# Patient Record
Sex: FEMALE | Race: BLACK OR AFRICAN AMERICAN | NOT HISPANIC OR LATINO | URBAN - METROPOLITAN AREA
[De-identification: names, ages, dates, MRNs, and addresses within clinical notes are randomized per-mention and may not be internally consistent; named-entity substitution may affect disease eponyms.]

---

## 2019-09-12 ENCOUNTER — INPATIENT (INPATIENT)
Facility: HOSPITAL | Age: 59
LOS: 2 days | Discharge: HOME | End: 2019-09-15
Attending: INTERNAL MEDICINE | Admitting: INTERNAL MEDICINE
Payer: COMMERCIAL

## 2019-09-12 VITALS
SYSTOLIC BLOOD PRESSURE: 178 MMHG | DIASTOLIC BLOOD PRESSURE: 98 MMHG | WEIGHT: 197.98 LBS | HEIGHT: 67 IN | OXYGEN SATURATION: 99 % | RESPIRATION RATE: 18 BRPM | TEMPERATURE: 98 F | HEART RATE: 96 BPM

## 2019-09-12 DIAGNOSIS — Z90.13 ACQUIRED ABSENCE OF BILATERAL BREASTS AND NIPPLES: Chronic | ICD-10-CM

## 2019-09-12 DIAGNOSIS — S89.90XA UNSPECIFIED INJURY OF UNSPECIFIED LOWER LEG, INITIAL ENCOUNTER: Chronic | ICD-10-CM

## 2019-09-12 DIAGNOSIS — K21.9 GASTRO-ESOPHAGEAL REFLUX DISEASE WITHOUT ESOPHAGITIS: ICD-10-CM

## 2019-09-12 DIAGNOSIS — C78.6 SECONDARY MALIGNANT NEOPLASM OF RETROPERITONEUM AND PERITONEUM: ICD-10-CM

## 2019-09-12 DIAGNOSIS — E66.9 OBESITY, UNSPECIFIED: ICD-10-CM

## 2019-09-12 DIAGNOSIS — I10 ESSENTIAL (PRIMARY) HYPERTENSION: ICD-10-CM

## 2019-09-12 DIAGNOSIS — Z98.890 OTHER SPECIFIED POSTPROCEDURAL STATES: Chronic | ICD-10-CM

## 2019-09-12 DIAGNOSIS — G47.33 OBSTRUCTIVE SLEEP APNEA (ADULT) (PEDIATRIC): ICD-10-CM

## 2019-09-12 DIAGNOSIS — C50.912 MALIGNANT NEOPLASM OF UNSPECIFIED SITE OF LEFT FEMALE BREAST: ICD-10-CM

## 2019-09-12 LAB
ALBUMIN SERPL ELPH-MCNC: 3.6 G/DL — SIGNIFICANT CHANGE UP (ref 3.3–5)
ALP SERPL-CCNC: 103 U/L — SIGNIFICANT CHANGE UP (ref 40–120)
ALT FLD-CCNC: 19 U/L — SIGNIFICANT CHANGE UP (ref 12–78)
ANION GAP SERPL CALC-SCNC: 7 MMOL/L — SIGNIFICANT CHANGE UP (ref 5–17)
AST SERPL-CCNC: 19 U/L — SIGNIFICANT CHANGE UP (ref 15–37)
BASOPHILS # BLD AUTO: 0.03 K/UL — SIGNIFICANT CHANGE UP (ref 0–0.2)
BASOPHILS NFR BLD AUTO: 0.3 % — SIGNIFICANT CHANGE UP (ref 0–2)
BILIRUB SERPL-MCNC: 0.4 MG/DL — SIGNIFICANT CHANGE UP (ref 0.2–1.2)
BUN SERPL-MCNC: 8 MG/DL — SIGNIFICANT CHANGE UP (ref 7–23)
CALCIUM SERPL-MCNC: 9.4 MG/DL — SIGNIFICANT CHANGE UP (ref 8.5–10.1)
CHLORIDE SERPL-SCNC: 104 MMOL/L — SIGNIFICANT CHANGE UP (ref 96–108)
CO2 SERPL-SCNC: 31 MMOL/L — SIGNIFICANT CHANGE UP (ref 22–31)
CREAT SERPL-MCNC: 0.83 MG/DL — SIGNIFICANT CHANGE UP (ref 0.5–1.3)
EOSINOPHIL # BLD AUTO: 0.02 K/UL — SIGNIFICANT CHANGE UP (ref 0–0.5)
EOSINOPHIL NFR BLD AUTO: 0.2 % — SIGNIFICANT CHANGE UP (ref 0–6)
GLUCOSE SERPL-MCNC: 114 MG/DL — HIGH (ref 70–99)
HCG SERPL-ACNC: 1 MIU/ML — SIGNIFICANT CHANGE UP
HCT VFR BLD CALC: 37.3 % — SIGNIFICANT CHANGE UP (ref 34.5–45)
HGB BLD-MCNC: 12.3 G/DL — SIGNIFICANT CHANGE UP (ref 11.5–15.5)
IMM GRANULOCYTES NFR BLD AUTO: 0.2 % — SIGNIFICANT CHANGE UP (ref 0–1.5)
LIDOCAIN IGE QN: 71 U/L — LOW (ref 73–393)
LYMPHOCYTES # BLD AUTO: 1.37 K/UL — SIGNIFICANT CHANGE UP (ref 1–3.3)
LYMPHOCYTES # BLD AUTO: 15.8 % — SIGNIFICANT CHANGE UP (ref 13–44)
MAGNESIUM SERPL-MCNC: 2.2 MG/DL — SIGNIFICANT CHANGE UP (ref 1.6–2.6)
MCHC RBC-ENTMCNC: 31.5 PG — SIGNIFICANT CHANGE UP (ref 27–34)
MCHC RBC-ENTMCNC: 33 GM/DL — SIGNIFICANT CHANGE UP (ref 32–36)
MCV RBC AUTO: 95.6 FL — SIGNIFICANT CHANGE UP (ref 80–100)
MONOCYTES # BLD AUTO: 0.75 K/UL — SIGNIFICANT CHANGE UP (ref 0–0.9)
MONOCYTES NFR BLD AUTO: 8.6 % — SIGNIFICANT CHANGE UP (ref 2–14)
NEUTROPHILS # BLD AUTO: 6.49 K/UL — SIGNIFICANT CHANGE UP (ref 1.8–7.4)
NEUTROPHILS NFR BLD AUTO: 74.9 % — SIGNIFICANT CHANGE UP (ref 43–77)
NRBC # BLD: 0 /100 WBCS — SIGNIFICANT CHANGE UP (ref 0–0)
PLATELET # BLD AUTO: 361 K/UL — SIGNIFICANT CHANGE UP (ref 150–400)
POTASSIUM SERPL-MCNC: 3.7 MMOL/L — SIGNIFICANT CHANGE UP (ref 3.5–5.3)
POTASSIUM SERPL-SCNC: 3.7 MMOL/L — SIGNIFICANT CHANGE UP (ref 3.5–5.3)
PROT SERPL-MCNC: 8.1 GM/DL — SIGNIFICANT CHANGE UP (ref 6–8.3)
RBC # BLD: 3.9 M/UL — SIGNIFICANT CHANGE UP (ref 3.8–5.2)
RBC # FLD: 12.2 % — SIGNIFICANT CHANGE UP (ref 10.3–14.5)
SODIUM SERPL-SCNC: 142 MMOL/L — SIGNIFICANT CHANGE UP (ref 135–145)
WBC # BLD: 8.68 K/UL — SIGNIFICANT CHANGE UP (ref 3.8–10.5)
WBC # FLD AUTO: 8.68 K/UL — SIGNIFICANT CHANGE UP (ref 3.8–10.5)

## 2019-09-12 PROCEDURE — 99285 EMERGENCY DEPT VISIT HI MDM: CPT

## 2019-09-12 PROCEDURE — 93010 ELECTROCARDIOGRAM REPORT: CPT

## 2019-09-12 PROCEDURE — 74177 CT ABD & PELVIS W/CONTRAST: CPT | Mod: 26

## 2019-09-12 PROCEDURE — 71045 X-RAY EXAM CHEST 1 VIEW: CPT | Mod: 26

## 2019-09-12 RX ORDER — ONDANSETRON 8 MG/1
4 TABLET, FILM COATED ORAL ONCE
Refills: 0 | Status: COMPLETED | OUTPATIENT
Start: 2019-09-12 | End: 2019-09-12

## 2019-09-12 RX ORDER — ONDANSETRON 8 MG/1
4 TABLET, FILM COATED ORAL EVERY 6 HOURS
Refills: 0 | Status: DISCONTINUED | OUTPATIENT
Start: 2019-09-12 | End: 2019-09-14

## 2019-09-12 RX ORDER — NEBIVOLOL HYDROCHLORIDE 5 MG/1
1 TABLET ORAL
Qty: 0 | Refills: 0 | DISCHARGE

## 2019-09-12 RX ORDER — CARVEDILOL PHOSPHATE 80 MG/1
3.12 CAPSULE, EXTENDED RELEASE ORAL EVERY 12 HOURS
Refills: 0 | Status: DISCONTINUED | OUTPATIENT
Start: 2019-09-12 | End: 2019-09-15

## 2019-09-12 RX ORDER — MORPHINE SULFATE 50 MG/1
4 CAPSULE, EXTENDED RELEASE ORAL ONCE
Refills: 0 | Status: DISCONTINUED | OUTPATIENT
Start: 2019-09-12 | End: 2019-09-12

## 2019-09-12 RX ORDER — ENOXAPARIN SODIUM 100 MG/ML
40 INJECTION SUBCUTANEOUS DAILY
Refills: 0 | Status: DISCONTINUED | OUTPATIENT
Start: 2019-09-12 | End: 2019-09-15

## 2019-09-12 RX ORDER — MORPHINE SULFATE 50 MG/1
2 CAPSULE, EXTENDED RELEASE ORAL EVERY 4 HOURS
Refills: 0 | Status: DISCONTINUED | OUTPATIENT
Start: 2019-09-12 | End: 2019-09-14

## 2019-09-12 RX ORDER — LISINOPRIL/HYDROCHLOROTHIAZIDE 10-12.5 MG
1 TABLET ORAL
Qty: 0 | Refills: 0 | DISCHARGE

## 2019-09-12 RX ORDER — ERGOCALCIFEROL 1.25 MG/1
1 CAPSULE ORAL
Qty: 0 | Refills: 0 | DISCHARGE

## 2019-09-12 RX ORDER — LISINOPRIL 2.5 MG/1
10 TABLET ORAL DAILY
Refills: 0 | Status: DISCONTINUED | OUTPATIENT
Start: 2019-09-12 | End: 2019-09-15

## 2019-09-12 RX ADMIN — ONDANSETRON 4 MILLIGRAM(S): 8 TABLET, FILM COATED ORAL at 11:12

## 2019-09-12 RX ADMIN — MORPHINE SULFATE 4 MILLIGRAM(S): 50 CAPSULE, EXTENDED RELEASE ORAL at 11:12

## 2019-09-12 RX ADMIN — CARVEDILOL PHOSPHATE 3.12 MILLIGRAM(S): 80 CAPSULE, EXTENDED RELEASE ORAL at 17:51

## 2019-09-12 RX ADMIN — ONDANSETRON 4 MILLIGRAM(S): 8 TABLET, FILM COATED ORAL at 11:13

## 2019-09-12 RX ADMIN — ENOXAPARIN SODIUM 40 MILLIGRAM(S): 100 INJECTION SUBCUTANEOUS at 17:51

## 2019-09-12 NOTE — ED PROVIDER NOTE - OBJECTIVE STATEMENT
Pertinent PMH/PSH/FHx/SHx and Review of Systems contained within:  59F hx of breast ca pw abd pain. symptoms x1 month but has never done anything about it until today. associated with nausea and vomiting. no fever, no diarrhea, no constipation. didn't take anything medicationwise. ros neg for ha, vision loss, rhinorrhea, dysuria, numbness, rash, bleeding,   Fh and Sh not otherwise contributory  ROS otherwise negative

## 2019-09-12 NOTE — ED ADULT NURSE NOTE - PMH
Acid reflux    Hypertension    Obesity (BMI 30.0-34.9)    Seasonal allergies    Sleep apnea  Use Oral Device

## 2019-09-12 NOTE — H&P ADULT - NSHPLABSRESULTS_GEN_ALL_CORE
12.3   8.68  )-----------( 361      ( 12 Sep 2019 11:12 )             37.3     09-12    142  |  104  |  8   ----------------------------<  114<H>  3.7   |  31  |  0.83    Ca    9.4      12 Sep 2019 11:12  Mg     2.2     09-12    TPro  8.1  /  Alb  3.6  /  TBili  0.4  /  DBili  x   /  AST  19  /  ALT  19  /  AlkPhos  103  09-12        CAPILLARY BLOOD GLUCOSE    < from: CT Abdomen and Pelvis w/ IV Cont (09.12.19 @ 11:41) >    IMPRESSION:     Mild ascites.  Omental nodularity, suspicious for peritoneal carcinomatosis.  No bowel obstruction.  Question 1.2 cm endometrial polyp.      < end of copied text >                Urine Culture:      Blood Culture:

## 2019-09-12 NOTE — PATIENT PROFILE ADULT - MONEY FOR FOOD
Anesthesia Evaluation     Patient summary reviewed and Nursing notes reviewed                Airway   Mallampati: I  TM distance: <3 FB  Neck ROM: full  no difficulty expected  Dental - normal exam     Pulmonary - normal exam   (+) pneumonia resolved , asthma, shortness of breath,   Cardiovascular - negative cardio ROS and normal exam        Neuro/Psych  (+) dizziness/light headedness, psychiatric history Anxiety,     GI/Hepatic/Renal/Endo    (+)  GERD, GI bleeding,     Musculoskeletal (-) negative ROS    Abdominal  - normal exam    Bowel sounds: normal.   Substance History - negative use     OB/GYN negative ob/gyn ROS         Other                        Anesthesia Plan    ASA 2     MAC     Anesthetic plan and risks discussed with patient.      
no

## 2019-09-12 NOTE — ED PROVIDER NOTE - CLINICAL SUMMARY MEDICAL DECISION MAKING FREE TEXT BOX
abd pain. abd pain. ct shows peritoneal carcinomatosis. case dw her oncologist dr marquez who recommends admission for vomit control, fluids and work up.

## 2019-09-12 NOTE — H&P ADULT - HISTORY OF PRESENT ILLNESS
· HPI  59F hx of breast ca pw abd pain. symptoms x1 month but has never done anything about it until today. associated with nausea and vomiting. no fever, no diarrhea, no constipation. didn't take anything medicationwise. ros neg for ha, vision loss, rhinorrhea, dysuria, numbness, rash, bleeding,   Fh and Sh not otherwise contributory ROS otherwise negative

## 2019-09-12 NOTE — ED PROVIDER NOTE - PHYSICAL EXAMINATION
Gen: Alert, NAD  Head: NC, AT   Eyes: PERRL, EOMI, normal lids/conjunctiva  ENT: normal hearing, patent oropharynx without erythema/exudate, uvula midline  Neck: supple, no tenderness, Trachea midline  Pulm: Bilateral BS, normal resp effort, no wheeze/stridor/retractions  CV: RRR, no M/R/G, 2+ radial and dp pulses bl, no edema  Abd: soft, distended. left side ttp  Mskel: extremities x4 with normal ROM and no joint effusions. no ctl spine ttp.   Skin: no rash, no bruising   Neuro: AAOx3, no sensory/motor deficits, CN 2-12 intact

## 2019-09-12 NOTE — H&P ADULT - NSICDXPASTMEDICALHX_GEN_ALL_CORE_FT
PAST MEDICAL HISTORY:  Acid reflux     Cancer of breast Rt. Female    Essential hypertension     Obesity (BMI 30.0-34.9)     Seasonal allergies     Sleep apnea Use Oral Device

## 2019-09-12 NOTE — H&P ADULT - NSICDXPASTSURGICALHX_GEN_ALL_CORE_FT
PAST SURGICAL HISTORY:  Knee injury Left & had surgery about 30 years ago    S/P bilateral mastectomy     S/P breast reconstruction, bilateral

## 2019-09-12 NOTE — ED ADULT NURSE NOTE - PAIN: PRESENCE, MLM
Elevated lower extremities while in recliner  Chest xray and blood work today   Video swallow- call to schedule soon      Hyponatremia  Hyponatremia means low sodium levels in the blood.  This condition most often occurs after prolonged vomiting or diarrhea follow your healthcare professional's instructions. Soft Diet  Your healthcare provider has prescribed a soft diet (also called gastrointestinal soft diet).  This means eating foods that are soft, low in fiber, and easy to digest. This diet is for pe apple, applesauce, peeled ripe peaches or pears, canned fruit (apricots, cherries, peaches, pears), melons  Avoid: Raw apple, dried fruits, coconut, pineapple, grapes, fruit kimani, fruit snacks  Meat and fish  OK:  All fresh meat, poultry, or fish that i butter  · Eggs  · Fish, turkey, chicken, or other meat that is not tough or stringy  · Tofu  Foods to avoid  · Nuts and seeds  · Snack foods, such as the following:  ? Chocolate-containing snacks, candy, pastries, or cakes. ?  Potato chips (plain, barbecue complains of pain/discomfort

## 2019-09-12 NOTE — ED ADULT NURSE NOTE - OBJECTIVE STATEMENT
pt received alert and oriented x4, pt c/o abdominal pain  , pt with vomiting , and abdominal pain that started this morning .

## 2019-09-12 NOTE — H&P ADULT - NSHPPHYSICALEXAM_GEN_ALL_CORE
GENERAL: NAD, well-groomed, well-developed  HEAD:  Atraumatic, Normocephalic  EYES: EOMI, PERRL, conjunctiva and sclera clear  ENMT:  Moist mucous membranes, Good dentition, No lesions  NECK: Supple, No JVD, Normal thyroid  NERVOUS SYSTEM:  Alert & Oriented X 3, Good concentration; Motor Strength 5/5 B/L upper and lower extremities; DTRs 2+ intact and symmetric  CHEST/LUNG: Clear to percussion bilaterally; No rales, rhonchi, wheezing, or rubs  HEART: Regular rate and rhythm; No murmurs, rubs, or gallops  ABDOMEN: Soft, Nontender, Distended; Bowel sounds present, Hypoactive  EXTREMITIES:  2+ Peripheral Pulses, No clubbing, cyanosis, or edema  LYMPH: No lymphadenopathy noted  SKIN: No rashes or lesions      Vital Signs Last 24 Hrs  T(C): 36.9 (12 Sep 2019 09:59), Max: 36.9 (12 Sep 2019 09:59)  T(F): 98.4 (12 Sep 2019 09:59), Max: 98.4 (12 Sep 2019 09:59)  HR: 96 (12 Sep 2019 09:59) (96 - 96)  BP: 178/98 (12 Sep 2019 09:59) (178/98 - 178/98)  BP(mean): --  RR: 18 (12 Sep 2019 09:59) (18 - 18)  SpO2: 99% (12 Sep 2019 09:59) (99% - 99%)

## 2019-09-12 NOTE — H&P ADULT - ASSESSMENT
IMPROVE VTE Individual Risk Assessment          RISK                                                          Points  [  ] Previous VTE                                                3  [  ] Thrombophilia                                             2  [  ] Lower limb paralysis                                   2        (unable to hold up >15 seconds)    [x  ] Current Cancer                                             2         (within 6 months)  [ x ] Immobilization > 24 hrs                              1  [  ] ICU/CCU stay > 24 hours                             1  [  ] Age > 60                                                         1    IMPROVE VTE Score:         [     3    ]    Total Risk Factor Score:    0 - 1:   Consider IPC  >2 - 3:  Thromboprophylaxis required (enoxaparin or SQ heparin)        >4:   High Risk: Thromboprophylaxis required (enoxaparin or SQ heparin), optional add IPC  **If CONTRAINDICATION to enoxaparin or SQ heparin, USE IPCs**    Lovenox  59F hx of breast ca pw abd pain. symptoms x1 month but has never done anything about it until today. associated with nausea and vomiting. no fever, no diarrhea, no constipation. didn't take anything medicationwise. ros neg for ha, vision loss, rhinorrhea, dysuria, numbness, rash, bleeding, Admitted for Carcinomatosis, Possible stage 4 breast ca. Will get Oncology consult. Clear  liquid diet . IV fluids.

## 2019-09-13 ENCOUNTER — RESULT REVIEW (OUTPATIENT)
Age: 59
End: 2019-09-13

## 2019-09-13 DIAGNOSIS — R10.84 GENERALIZED ABDOMINAL PAIN: ICD-10-CM

## 2019-09-13 DIAGNOSIS — K59.09 OTHER CONSTIPATION: ICD-10-CM

## 2019-09-13 LAB
ANION GAP SERPL CALC-SCNC: 6 MMOL/L — SIGNIFICANT CHANGE UP (ref 5–17)
APPEARANCE UR: CLEAR — SIGNIFICANT CHANGE UP
APTT BLD: 32.6 SEC — SIGNIFICANT CHANGE UP (ref 27.5–36.3)
BASOPHILS # BLD AUTO: 0.02 K/UL — SIGNIFICANT CHANGE UP (ref 0–0.2)
BASOPHILS NFR BLD AUTO: 0.4 % — SIGNIFICANT CHANGE UP (ref 0–2)
BILIRUB UR-MCNC: NEGATIVE — SIGNIFICANT CHANGE UP
BUN SERPL-MCNC: 7 MG/DL — SIGNIFICANT CHANGE UP (ref 7–23)
CALCIUM SERPL-MCNC: 8.6 MG/DL — SIGNIFICANT CHANGE UP (ref 8.5–10.1)
CANCER AG125 SERPL-ACNC: 305 U/ML — HIGH
CEA SERPL-MCNC: 1 NG/ML — SIGNIFICANT CHANGE UP (ref 0–3.8)
CHLORIDE SERPL-SCNC: 105 MMOL/L — SIGNIFICANT CHANGE UP (ref 96–108)
CO2 SERPL-SCNC: 30 MMOL/L — SIGNIFICANT CHANGE UP (ref 22–31)
COLOR SPEC: YELLOW — SIGNIFICANT CHANGE UP
CREAT SERPL-MCNC: 0.81 MG/DL — SIGNIFICANT CHANGE UP (ref 0.5–1.3)
DIFF PNL FLD: ABNORMAL
EOSINOPHIL # BLD AUTO: 0.05 K/UL — SIGNIFICANT CHANGE UP (ref 0–0.5)
EOSINOPHIL NFR BLD AUTO: 0.9 % — SIGNIFICANT CHANGE UP (ref 0–6)
EPI CELLS # UR: SIGNIFICANT CHANGE UP
GLUCOSE SERPL-MCNC: 90 MG/DL — SIGNIFICANT CHANGE UP (ref 70–99)
GLUCOSE UR QL: NEGATIVE MG/DL — SIGNIFICANT CHANGE UP
HCT VFR BLD CALC: 33.4 % — LOW (ref 34.5–45)
HCV AB S/CO SERPL IA: 0.12 S/CO — SIGNIFICANT CHANGE UP (ref 0–0.99)
HCV AB SERPL-IMP: SIGNIFICANT CHANGE UP
HGB BLD-MCNC: 10.8 G/DL — LOW (ref 11.5–15.5)
IMM GRANULOCYTES NFR BLD AUTO: 0.2 % — SIGNIFICANT CHANGE UP (ref 0–1.5)
INR BLD: 1.16 RATIO — SIGNIFICANT CHANGE UP (ref 0.88–1.16)
KETONES UR-MCNC: NEGATIVE — SIGNIFICANT CHANGE UP
LEUKOCYTE ESTERASE UR-ACNC: ABNORMAL
LYMPHOCYTES # BLD AUTO: 1.54 K/UL — SIGNIFICANT CHANGE UP (ref 1–3.3)
LYMPHOCYTES # BLD AUTO: 28.4 % — SIGNIFICANT CHANGE UP (ref 13–44)
MAGNESIUM SERPL-MCNC: 2.3 MG/DL — SIGNIFICANT CHANGE UP (ref 1.6–2.6)
MCHC RBC-ENTMCNC: 31.3 PG — SIGNIFICANT CHANGE UP (ref 27–34)
MCHC RBC-ENTMCNC: 32.3 GM/DL — SIGNIFICANT CHANGE UP (ref 32–36)
MCV RBC AUTO: 96.8 FL — SIGNIFICANT CHANGE UP (ref 80–100)
MONOCYTES # BLD AUTO: 0.57 K/UL — SIGNIFICANT CHANGE UP (ref 0–0.9)
MONOCYTES NFR BLD AUTO: 10.5 % — SIGNIFICANT CHANGE UP (ref 2–14)
NEUTROPHILS # BLD AUTO: 3.24 K/UL — SIGNIFICANT CHANGE UP (ref 1.8–7.4)
NEUTROPHILS NFR BLD AUTO: 59.6 % — SIGNIFICANT CHANGE UP (ref 43–77)
NITRITE UR-MCNC: NEGATIVE — SIGNIFICANT CHANGE UP
NRBC # BLD: 0 /100 WBCS — SIGNIFICANT CHANGE UP (ref 0–0)
PH UR: 7 — SIGNIFICANT CHANGE UP (ref 5–8)
PLATELET # BLD AUTO: 314 K/UL — SIGNIFICANT CHANGE UP (ref 150–400)
POTASSIUM SERPL-MCNC: 3.4 MMOL/L — LOW (ref 3.5–5.3)
POTASSIUM SERPL-SCNC: 3.4 MMOL/L — LOW (ref 3.5–5.3)
PROT UR-MCNC: NEGATIVE MG/DL — SIGNIFICANT CHANGE UP
PROTHROM AB SERPL-ACNC: 13.1 SEC — HIGH (ref 10–12.9)
RBC # BLD: 3.45 M/UL — LOW (ref 3.8–5.2)
RBC # FLD: 12.4 % — SIGNIFICANT CHANGE UP (ref 10.3–14.5)
RBC CASTS # UR COMP ASSIST: SIGNIFICANT CHANGE UP /HPF (ref 0–4)
SODIUM SERPL-SCNC: 141 MMOL/L — SIGNIFICANT CHANGE UP (ref 135–145)
SP GR SPEC: 1 — LOW (ref 1.01–1.02)
UROBILINOGEN FLD QL: NEGATIVE MG/DL — SIGNIFICANT CHANGE UP
WBC # BLD: 5.43 K/UL — SIGNIFICANT CHANGE UP (ref 3.8–10.5)
WBC # FLD AUTO: 5.43 K/UL — SIGNIFICANT CHANGE UP (ref 3.8–10.5)
WBC UR QL: ABNORMAL

## 2019-09-13 PROCEDURE — 99221 1ST HOSP IP/OBS SF/LOW 40: CPT | Mod: 25

## 2019-09-13 PROCEDURE — 77012 CT SCAN FOR NEEDLE BIOPSY: CPT | Mod: 26

## 2019-09-13 PROCEDURE — 49180 BIOPSY ABDOMINAL MASS: CPT

## 2019-09-13 PROCEDURE — 88305 TISSUE EXAM BY PATHOLOGIST: CPT | Mod: 26

## 2019-09-13 RX ORDER — SENNA PLUS 8.6 MG/1
2 TABLET ORAL AT BEDTIME
Refills: 0 | Status: DISCONTINUED | OUTPATIENT
Start: 2019-09-13 | End: 2019-09-15

## 2019-09-13 RX ORDER — PANTOPRAZOLE SODIUM 20 MG/1
40 TABLET, DELAYED RELEASE ORAL DAILY
Refills: 0 | Status: DISCONTINUED | OUTPATIENT
Start: 2019-09-13 | End: 2019-09-14

## 2019-09-13 RX ORDER — POLYETHYLENE GLYCOL 3350 17 G/17G
17 POWDER, FOR SOLUTION ORAL DAILY
Refills: 0 | Status: DISCONTINUED | OUTPATIENT
Start: 2019-09-13 | End: 2019-09-15

## 2019-09-13 RX ORDER — DOCUSATE SODIUM 100 MG
100 CAPSULE ORAL DAILY
Refills: 0 | Status: DISCONTINUED | OUTPATIENT
Start: 2019-09-13 | End: 2019-09-15

## 2019-09-13 RX ORDER — POTASSIUM CHLORIDE 20 MEQ
20 PACKET (EA) ORAL ONCE
Refills: 0 | Status: COMPLETED | OUTPATIENT
Start: 2019-09-13 | End: 2019-09-13

## 2019-09-13 RX ORDER — ACETAMINOPHEN 500 MG
650 TABLET ORAL EVERY 6 HOURS
Refills: 0 | Status: DISCONTINUED | OUTPATIENT
Start: 2019-09-13 | End: 2019-09-15

## 2019-09-13 RX ADMIN — CARVEDILOL PHOSPHATE 3.12 MILLIGRAM(S): 80 CAPSULE, EXTENDED RELEASE ORAL at 17:33

## 2019-09-13 RX ADMIN — LISINOPRIL 10 MILLIGRAM(S): 2.5 TABLET ORAL at 05:31

## 2019-09-13 RX ADMIN — PANTOPRAZOLE SODIUM 40 MILLIGRAM(S): 20 TABLET, DELAYED RELEASE ORAL at 12:32

## 2019-09-13 RX ADMIN — Medication 20 MILLIEQUIVALENT(S): at 13:05

## 2019-09-13 RX ADMIN — MORPHINE SULFATE 2 MILLIGRAM(S): 50 CAPSULE, EXTENDED RELEASE ORAL at 17:41

## 2019-09-13 RX ADMIN — MORPHINE SULFATE 2 MILLIGRAM(S): 50 CAPSULE, EXTENDED RELEASE ORAL at 17:56

## 2019-09-13 RX ADMIN — SENNA PLUS 2 TABLET(S): 8.6 TABLET ORAL at 22:39

## 2019-09-13 RX ADMIN — CARVEDILOL PHOSPHATE 3.12 MILLIGRAM(S): 80 CAPSULE, EXTENDED RELEASE ORAL at 05:31

## 2019-09-13 NOTE — CONSULT NOTE ADULT - ASSESSMENT
A/P: 58yo Female with PMH HTN, Left Breast Ca s/p b/l mastectomy and chemo tx in 2017 now presents c/o abd pain and bloating x1 month, CT shows omental nodularity, suspicious for peritoneal carcinomatosis. No bowel obstruction.    -IR consult for biopsy   -Oncology consult appreciated, f/u tumor markers  -diet as tolerated  -pain control and antiemetic prn  -cont medical management and supportive care  -DVT ppx  -f/u labs  -Discussed with Dr. Poole
58 yo female with pmhx of breast cancer s/p b/l mastectomy and b/l axillary lymph node dissection (as per patient b/l lymph nodes were cancerous) and chemo treatment in 2017.  Pt now with abdominal pain, found to have Omental nodularity on CT scan suspicious for peritoneal carcinomatosis
58yo Female with PMH HTN, Left Breast Ca s/p b/l mastectomy and chemo tx in 2017 who presents with intermittent abdominal pain and bloating x 1 month. CT concerning for peritoneal carcinomatosis.     Recent colonoscopy 3/2019 normal per patient. No signs/symptoms of bowel obstruction. She is pending IR biopsy of peritoneal met.

## 2019-09-13 NOTE — CONSULT NOTE ADULT - CONSULT REASON
Abdominal pain, peritoneal carcinomatosis
Abdominal Pain.  Ascites.  Abdominal Distension.
Abdominal pain, bloating; peritoneal carcinomatosis on CT
omental nodular biopsy

## 2019-09-13 NOTE — CHART NOTE - NSCHARTNOTEFT_GEN_A_CORE
Vascular & Interventional Radiology Post-Procedure Note    Pre-Procedure Diagnosis: Metastasis  Post-Procedure Diagnosis: Same as pre.  Indications for Procedure: workup    : Elyse  Assistant(s): none    Procedure Details/Findings: Left upper quadrant omental nodularity biopsy  Access (if applicable): n/a    Complications: none  Estimated Blood Loss: Minimal  Specimen: 4 cores 18G x 2cm each  Contrast: none  Sedation: none  Patient Condition/Disposition: stable, 1 hour recovery, then floor    Plan: f/u pathology  -OK to resume anticoagulation if still prescribed in 24 hours.

## 2019-09-13 NOTE — PROGRESS NOTE ADULT - SUBJECTIVE AND OBJECTIVE BOX
Patient is a 59y old  Female who presents with a chief complaint of N/V Pain abdomen (13 Sep 2019 12:29)      INTERVAL HPI/OVERNIGHT EVENTS:    MEDICATIONS  (STANDING):  carvedilol 3.125 milliGRAM(s) Oral every 12 hours  docusate sodium 100 milliGRAM(s) Oral daily  enoxaparin Injectable 40 milliGRAM(s) SubCutaneous daily  lisinopril 10 milliGRAM(s) Oral daily  pantoprazole  Injectable 40 milliGRAM(s) IV Push daily  senna 2 Tablet(s) Oral at bedtime    MEDICATIONS  (PRN):  morphine  - Injectable 2 milliGRAM(s) IV Push every 4 hours PRN Severe Pain (7 - 10)  ondansetron Injectable 4 milliGRAM(s) IV Push every 6 hours PRN Nausea  polyethylene glycol 3350 17 Gram(s) Oral daily PRN Constipation      Allergies    No Known Allergies    Intolerances        REVIEW OF SYSTEMS:  CONSTITUTIONAL: No fever, weight loss, or fatigue  EYES: No eye pain, visual disturbances, or discharge  ENMT:  No difficulty hearing, tinnitus, vertigo; No sinus or throat pain  NECK: No pain or stiffness  BREASTS: No pain, masses, or nipple discharge  RESPIRATORY: No cough, wheezing, chills or hemoptysis; No shortness of breath  CARDIOVASCULAR: No chest pain, palpitations, dizziness, or leg swelling  GASTROINTESTINAL: No abdominal or epigastric pain. No nausea, vomiting, or hematemesis; No diarrhea or constipation. No melena or hematochezia.  GENITOURINARY: No dysuria, frequency, hematuria, or incontinence  NEUROLOGICAL: No headaches, memory loss, loss of strength, numbness, or tremors  SKIN: No itching, burning, rashes, or lesions   LYMPH NODES: No enlarged glands  ENDOCRINE: No heat or cold intolerance; No hair loss  MUSCULOSKELETAL: No joint pain or swelling; No muscle, back, or extremity pain  PSYCHIATRIC: No depression, anxiety, mood swings, or difficulty sleeping  HEME/LYMPH: No easy bruising, or bleeding gums  ALLERGY AND IMMUNOLOGIC: No hives or eczema    Vital Signs Last 24 Hrs  T(C): 36.7 (13 Sep 2019 11:10), Max: 36.9 (12 Sep 2019 16:30)  T(F): 98 (13 Sep 2019 11:10), Max: 98.5 (12 Sep 2019 16:30)  HR: 70 (13 Sep 2019 12:37) (69 - 77)  BP: 170/81 (13 Sep 2019 12:37) (125/71 - 170/81)  BP(mean): --  RR: 18 (13 Sep 2019 11:10) (18 - 21)  SpO2: 97% (13 Sep 2019 11:10) (95% - 100%)    PHYSICAL EXAM:  GENERAL: NAD, well-groomed, well-developed  HEAD:  Atraumatic, Normocephalic  EYES: EOMI, PERRL, conjunctiva and sclera clear  ENMT:  Moist mucous membranes, Good dentition, No lesions  NECK: Supple, No JVD, Normal thyroid  NERVOUS SYSTEM:  Alert & Oriented X3, Good concentration; Motor Strength 5/5 B/L upper and lower extremities; DTRs 2+ intact and symmetric  CHEST/LUNG: Clear to percussion bilaterally; No rales, rhonchi, wheezing, or rubs  HEART: Regular rate and rhythm; No murmurs, rubs, or gallops  ABDOMEN: Soft, Nontender, Nondistended; Bowel sounds present  EXTREMITIES:  2+ Peripheral Pulses, No clubbing, cyanosis, or edema  LYMPH: No lymphadenopathy noted  SKIN: No rashes or lesions    LABS:                        10.8   5.43  )-----------( 314      ( 13 Sep 2019 06:44 )             33.4         141  |  105  |  7   ----------------------------<  90  3.4<L>   |  30  |  0.81    Ca    8.6      13 Sep 2019 06:44  Mg     2.3         TPro  8.1  /  Alb  3.6  /  TBili  0.4  /  DBili  x   /  AST  19  /  ALT  19  /  AlkPhos  103  09-12    PT/INR - ( 13 Sep 2019 12:46 )   PT: 13.1 sec;   INR: 1.16 ratio         PTT - ( 13 Sep 2019 12:46 )  PTT:32.6 sec  Urinalysis Basic - ( 13 Sep 2019 00:55 )    Color: Yellow / Appearance: Clear / S.005 / pH: x  Gluc: x / Ketone: Negative  / Bili: Negative / Urobili: Negative mg/dL   Blood: x / Protein: Negative mg/dL / Nitrite: Negative   Leuk Esterase: Small / RBC: 0-2 /HPF / WBC 26-50   Sq Epi: x / Non Sq Epi: Few / Bacteria: x      CAPILLARY BLOOD GLUCOSE        RADIOLOGY & ADDITIONAL TESTS:  < from: CT Abdomen and Pelvis w/ IV Cont (19 @ 11:41) >    EXAM:  CT ABDOMEN AND PELVIS IC                            PROCEDURE DATE:  2019          INTERPRETATION:  CLINICAL INFORMATION: Left abdominal pain    COMPARISON: None.    PROCEDURE:   CT of the Abdomen and Pelvis was performed with intravenous contrast.   Intravenous contrast: 90 ml Omnipaque 350. 10 ml discarded.  Oral contrast: None.  Sagittal and coronal reformats were performed.    FINDINGS:    LOWER CHEST: Bilateral breast implants. Basilar atelectasis.    LIVER: Within normal limits.  BILE DUCTS: Normal caliber.  GALLBLADDER: Within normal limits.  SPLEEN: Within normal limits.  PANCREAS: Within normal limits.  ADRENALS: Within normal limits.  KIDNEYS/URETERS: Within normal limits.    BLADDER: Minimally distended.  REPRODUCTIVE ORGANS: Question 1.2 cm endometrial polyp.    BOWEL: No bowel obstruction. Appendix within normal limits. Several   colonic diverticula.  PERITONEUM: Mild ascites. Omental nodularity.  VESSELS: Within normal limits.  RETROPERITONEUM/LYMPH NODES: No lymphadenopathy. Prominent   retroperitoneal lymph nodes.  ABDOMINAL WALL: Within normal limits.  BONES: Spinal degenerative changes.    IMPRESSION:     Mild ascites.  Omental nodularity, suspicious for peritoneal carcinomatosis.  No bowel obstruction.  Question 1.2 cm endometrial polyp.      VERA GARCIA M.D., ATTENDING RADIOLOGIST  This document has been electronically signed. Sep 12 2019 11:58AM       < end of copied text >    Imaging Personally Reviewed:  [ ] YES  [ ] NO    Consultant(s) Notes Reviewed:  [ ] YES  [ ] NO    Care Discussed with Consultants/Other Providers [ ] YES  [ ] NO    PROBLEMS:  PERITONEAL, CARCINOMATOSIS  UPPER ABDOMINAL PAIN VOMITING  Peritoneal carcinomatosis  Other constipation  Generalized abdominal pain  Obesity (BMI 30.0-34.9)  Obstructive sleep apnea syndrome  Gastroesophageal reflux disease without esophagitis  Malignant neoplasm of left female breast, unspecified estrogen receptor status, unspecified site of breast  Essential hypertension  Peritoneal carcinomatosis      Care discussed with family,         [  ]   yes  [  ]  No    imp:    stable[ ]    unstable[  ]     improving [   ]       unchanged  [  ]                Plans:  Continue present plans  [  ]               New consult [  ]   specialty  .......               order oc[  ]    test name.                  Discharge Planning  [  ]

## 2019-09-13 NOTE — CONSULT NOTE ADULT - PROBLEM SELECTOR RECOMMENDATION 2
-IR consult for biopsy pending   -Appreciate oncology and surgery recommendations   -Tumor markers pending  **Obtain records from recent colonoscopy: patient reported 3/2019, normal (unsure of GI physician however she will obtain this information).

## 2019-09-13 NOTE — CONSULT NOTE ADULT - SUBJECTIVE AND OBJECTIVE BOX
GENERAL SURGERY CONSULT NOTE    Patient is a 59y old  Female who presents with a chief complaint of N/V Pain abdomen (13 Sep 2019 11:02)    HPI:  · HPI  59F hx of breast ca pw abd pain. symptoms x1 month but has never done anything about it until today. associated with nausea and vomiting. no fever, no diarrhea, no constipation. didn't take anything medicationwise. ros neg for ha, vision loss, rhinorrhea, dysuria, numbness, rash, bleeding,   Fh and Sh not otherwise contributory ROS otherwise negative (12 Sep 2019 14:18)    Pt seen and examined at bedside, resting comfortably. Patient with h/o breast ca s/p b/l mastectomy and chemo therapy in 2017. States she has done well since up until about 1 month ago she started experiencing vague abdominal pains, progressively worsening, described as "crampy" in nature mostly after eating associated with abdominal "heaviness" and bloating. Admits to nausea/vomiting x1 day. Has been tolerating a regular diet and having BMs daily, + flatus.   States she was seen by GYN and GI this year, colonoscopy with no abnormal results per patient.   Denies fever/chills, chest pain, sob, dizziness or urinary symptoms.     PAST MEDICAL & SURGICAL HISTORY:  Essential hypertension  Cancer of breast: Rt. Female  Sleep apnea: Use Oral Device  Obesity (BMI 30.0-34.9)  Seasonal allergies  Acid reflux  S/P breast reconstruction, bilateral  S/P bilateral mastectomy  Knee injury: Left &amp; had surgery about 30 years ago      Review of Systems:  As noted in HPI    MEDICATIONS  (STANDING):  carvedilol 3.125 milliGRAM(s) Oral every 12 hours  enoxaparin Injectable 40 milliGRAM(s) SubCutaneous daily  lisinopril 10 milliGRAM(s) Oral daily  pantoprazole  Injectable 40 milliGRAM(s) IV Push daily  potassium chloride    Tablet ER 20 milliEquivalent(s) Oral once    MEDICATIONS  (PRN):  morphine  - Injectable 2 milliGRAM(s) IV Push every 4 hours PRN Severe Pain (7 - 10)  ondansetron Injectable 4 milliGRAM(s) IV Push every 6 hours PRN Nausea    Allergies  No Known Allergies    SOCIAL HISTORY   Denies tobacco use  Social alcohol use.     FAMILY HISTORY:  Denies    Vital Signs Last 24 Hrs  T(C): 36.5 (13 Sep 2019 04:42), Max: 36.9 (12 Sep 2019 16:30)  T(F): 97.7 (13 Sep 2019 04:42), Max: 98.5 (12 Sep 2019 16:30)  HR: 71 (13 Sep 2019 04:42) (71 - 77)  BP: 125/71 (13 Sep 2019 04:42) (125/71 - 150/83)  RR: 18 (13 Sep 2019 04:42) (18 - 21)  SpO2: 95% (13 Sep 2019 04:42) (95% - 100%)    Physical Exam:    General:  Appears stated age, well-groomed, well-nourished, no distress  Eyes : MIRIAM  HENT:  WNL, no JVD  Chest:  clear breath sounds  Cardiovascular: +S1/S2, Regular rate & rhythm  Abdomen: Sightly distended, +BSx4, soft, Mild Left periumbilical and LUQ tenderness, no guarding, no rebound   Extremities:  No pedal edema, calf soft, nontender b/l. 2+ peripheral pulses b/l.     Skin:  No rash  Musculoskeletal:  normal strength  Neuro/Psych:  Alert, oriented to time, place and person       LABS:                        10.8   5.43  )-----------( 314      ( 13 Sep 2019 06:44 )             33.4     09-    141  |  105  |  7   ----------------------------<  90  3.4<L>   |  30  |  0.81    Ca    8.6      13 Sep 2019 06:44  Mg     2.3         TPro  8.1  /  Alb  3.6  /  TBili  0.4  /  DBili  x   /  AST  19  /  ALT  19  /  AlkPhos  103  09-12      Urinalysis Basic - ( 13 Sep 2019 00:55 )    Color: Yellow / Appearance: Clear / S.005 / pH: x  Gluc: x / Ketone: Negative  / Bili: Negative / Urobili: Negative mg/dL   Blood: x / Protein: Negative mg/dL / Nitrite: Negative   Leuk Esterase: Small / RBC: 0-2 /HPF / WBC 26-50   Sq Epi: x / Non Sq Epi: Few / Bacteria: x    RADIOLOGY & ADDITIONAL STUDIES:  < from: CT Abdomen and Pelvis w/ IV Cont (19 @ 11:41) >  EXAM:  CT ABDOMEN AND PELVIS IC                            PROCEDURE DATE:  2019          INTERPRETATION:  CLINICAL INFORMATION: Left abdominal pain    COMPARISON: None.    PROCEDURE:   CT of the Abdomen and Pelvis was performed with intravenous contrast.   Intravenous contrast: 90 ml Omnipaque 350. 10 ml discarded.  Oral contrast: None.  Sagittal and coronal reformats were performed.    FINDINGS:    LOWER CHEST: Bilateral breast implants. Basilar atelectasis.    LIVER: Within normal limits.  BILE DUCTS: Normal caliber.  GALLBLADDER: Within normal limits.  SPLEEN: Within normal limits.  PANCREAS: Within normal limits.  ADRENALS: Within normal limits.  KIDNEYS/URETERS: Within normal limits.    BLADDER: Minimally distended.  REPRODUCTIVE ORGANS: Question 1.2 cm endometrial polyp.    BOWEL: No bowel obstruction. Appendix within normal limits. Several   colonic diverticula.  PERITONEUM: Mild ascites. Omental nodularity.  VESSELS: Within normal limits.  RETROPERITONEUM/LYMPH NODES: No lymphadenopathy. Prominent   retroperitoneal lymph nodes.  ABDOMINAL WALL: Within normal limits.  BONES: Spinal degenerative changes.    IMPRESSION:     Mild ascites.  Omental nodularity, suspicious for peritoneal carcinomatosis.  No bowel obstruction.  Question 1.2 cm endometrial polyp.      VERA GARCIA M.D., ATTENDING RADIOLOGIST  This document has been electronically signed. Sep 12 2019 11:58AM    < end of copied text >    < from: Xray Chest 1 View- PORTABLE-Urgent (19 @ 14:38) >    EXAM:  XR CHEST PORTABLE URGENT 1V                            PROCEDURE DATE:  2019        INTERPRETATION:  PROCEDURE: AP view of the chest.    CLINICAL INFORMATION: Nausea and vomiting.    COMPARISON: None.    FINDINGS:     Bilateral breast implants are noted.    Lungs: There are no lung consolidations.  Heart: The heart is normal in size.  Mediastinum: The mediastinum is within normal limits.    IMPRESSION:    No lung consolidations.    < end of copied text >
Chief Complaint:  Patient is a 59y old  Female who presents with a chief complaint of N/V Pain abdomen (13 Sep 2019 11:32)      HPI:  HPI  59F hx of breast ca pw abd pain. symptoms x1 month but has never done anything about it until today. associated with nausea and vomiting. no fever, no diarrhea, no constipation. didn't take anything medicationwise. ros neg for ha, vision loss, rhinorrhea, dysuria, numbness, rash, bleeding,   Fh and Sh not otherwise contributory ROS otherwise negative (12 Sep 2019 14:18). GI was consulted for the above.     Ms. Mg is a 59 F with PMHx Breast cancer s/p b/l mastectomy and chemotherapy . She reports that she was in remission per GYN. Over the last 1 month she has been having worsening intermittent abdominal discomfort, non-radiating, no related to po intake. She also reports significant bloating and "gas pain", which is relieved with bowel movements. She notes increased abdominal distention. She was concerned about her symptoms.  She is passing normal bowel movements, brown, soft, without blood. She is passing flatus without any issues. She denies any nausea/vomiting, fevers, chills. She reports that she had recent colonoscopy Feb or 2019, with normal results, however she is unsure who the gastroenterolgosti is.        PMH/PSH:PAST MEDICAL & SURGICAL HISTORY:  Essential hypertension  Cancer of breast: Rt. Female  Sleep apnea: Use Oral Device  Obesity (BMI 30.0-34.9)  Seasonal allergies  Acid reflux  S/P breast reconstruction, bilateral  S/P bilateral mastectomy  Knee injury: Left &amp; had surgery about 30 years ago      Allergies:  No Known Allergies      Medications:  carvedilol 3.125 milliGRAM(s) Oral every 12 hours  enoxaparin Injectable 40 milliGRAM(s) SubCutaneous daily  lisinopril 10 milliGRAM(s) Oral daily  morphine  - Injectable 2 milliGRAM(s) IV Push every 4 hours PRN  ondansetron Injectable 4 milliGRAM(s) IV Push every 6 hours PRN  pantoprazole  Injectable 40 milliGRAM(s) IV Push daily  potassium chloride    Tablet ER 20 milliEquivalent(s) Oral once      Review of Systems:    General:  No weight loss, fevers, chills, night sweats, fatigue,   Eyes:  Good vision, no reported pain  ENT:  No sore throat, pain, runny nose, dysphagia  CV:  No pain, palpitations, hypo/hypertension  Resp:  No dyspnea, cough, tachypnea, wheezing  GI:  as per HPI   :  No pain, bleeding, incontinence, nocturia  Muscle:  No pain, weakness  Breast:  No pain, abscess, mass, discharge  Neuro:  No weakness, tingling, memory problems  Psych:  No fatigue, insomnia, mood problems, depression  Endocrine:  No polyuria, polydipsia, cold/heat intolerance  Heme:  No petechiae, ecchymosis, easy bruisability  Skin:  No rash, tattoos, scars, edema    Relevant Family History:   FAMILY HISTORY:      Relevant Social History: Alcohol ( -) , Tobacco ( -) , Illicit drugs (- )     Physical Exam:    Vital Signs:  Vital Signs Last 24 Hrs  T(C): 36.5 (13 Sep 2019 04:42), Max: 36.9 (12 Sep 2019 16:30)  T(F): 97.7 (13 Sep 2019 04:42), Max: 98.5 (12 Sep 2019 16:30)  HR: 71 (13 Sep 2019 04:42) (71 - 77)  BP: 125/71 (13 Sep 2019 04:42) (125/71 - 150/83)  BP(mean): --  RR: 18 (13 Sep 2019 04:42) (18 - 21)  SpO2: 95% (13 Sep 2019 04:42) (95% - 100%)  Daily     Daily Weight in k (13 Sep 2019 04:42)    General:  NAD   HEENT:  NC/AT,  conjunctivae clear and pink, no thyromegaly, nodules, adenopathy, no JVD, anicteric sclera  Chest:  Full & symmetric excursion, no increased effort, breath sounds clear  Cardiovascular:  Regular rhythm, S1, S2, no murmur/rub/S3/S4, no abdominal bruit, no edema  Abdomen:  Soft,  ttp right upper and lower quadrant to deep palpation, no rebound/guarding , + distention, normoactive bowel sounds,  Extremities:  no cyanosis, clubbing or edema  Skin:  No rash/erythema/ecchymoses/petechiae/wounds/abscess/warm/dry  Neuro/Psych:  Alert, oriented, no asterixis, no tremor, no encephalopathy    Laboratory:                          10.8   5.43  )-----------( 314      ( 13 Sep 2019 06:44 )             33.4         141  |  105  |  7   ----------------------------<  90  3.4<L>   |  30  |  0.81    Ca    8.6      13 Sep 2019 06:44  Mg     2.3         TPro  8.1  /  Alb  3.6  /  TBili  0.4  /  DBili  x   /  AST  19  /  ALT  19  /  AlkPhos  103      LIVER FUNCTIONS - ( 12 Sep 2019 11:12 )  Alb: 3.6 g/dL / Pro: 8.1 gm/dL / ALK PHOS: 103 U/L / ALT: 19 U/L / AST: 19 U/L / GGT: x             Urinalysis Basic - ( 13 Sep 2019 00:55 )    Color: Yellow / Appearance: Clear / S.005 / pH: x  Gluc: x / Ketone: Negative  / Bili: Negative / Urobili: Negative mg/dL   Blood: x / Protein: Negative mg/dL / Nitrite: Negative   Leuk Esterase: Small / RBC: 0-2 /HPF / WBC 26-50   Sq Epi: x / Non Sq Epi: Few / Bacteria: x      Amylase Serum--      Lipase serum71 U/L<L>       Ammonia--      Intake and Output      Imaging:  < from: CT Abdomen and Pelvis w/ IV Cont (19 @ 11:41) >    EXAM:  CT ABDOMEN AND PELVIS IC                            PROCEDURE DATE:  2019          INTERPRETATION:  CLINICAL INFORMATION: Left abdominal pain    COMPARISON: None.    PROCEDURE:   CT of the Abdomen and Pelvis was performed with intravenous contrast.   Intravenous contrast: 90 ml Omnipaque 350. 10 ml discarded.  Oral contrast: None.  Sagittal and coronal reformats were performed.    FINDINGS:    LOWER CHEST: Bilateral breast implants. Basilar atelectasis.    LIVER: Within normal limits.  BILE DUCTS: Normal caliber.  GALLBLADDER: Within normal limits.  SPLEEN: Within normal limits.  PANCREAS: Within normal limits.  ADRENALS: Within normal limits.  KIDNEYS/URETERS: Within normal limits.    BLADDER: Minimally distended.  REPRODUCTIVE ORGANS: Question 1.2 cm endometrial polyp.    BOWEL: No bowel obstruction. Appendix within normal limits. Several   colonic diverticula.  PERITONEUM: Mild ascites. Omental nodularity.  VESSELS: Within normal limits.  RETROPERITONEUM/LYMPH NODES: No lymphadenopathy. Prominent   retroperitoneal lymph nodes.  ABDOMINAL WALL: Within normal limits.  BONES: Spinal degenerative changes.    IMPRESSION:     Mild ascites.  Omental nodularity, suspicious for peritoneal carcinomatosis.  No bowel obstruction.  Question 1.2 cm endometrial polyp.    VERA GARCIA M.D., ATTENDING RADIOLOGIST  This document has been electronically signed. Sep 12 2019 11:58AM
Patient is a 59y old  Female who presents with a chief complaint of N/V Pain abdomen (13 Sep 2019 11:51)    IR consulted for omental nodule biopsy.   59F hx of breast ca  Pt seen and examined at bedside, resting comfortably. Patient with h/o breast ca s/p b/l mastectomy, b/l axillary lymph node dissection, and chemo therapy in 2017.    Pt pw abd pain. symptoms x1 month but has never done anything about it until today. Associated with nausea and vomiting. no fever, night sweats, weight loss, diarrhea, constipation.   Recent colonoscopy 3/2019 normal per patient.     CT abdomen/pelvis imaging shows:  Mild ascites.  Omental nodularity, suspicious for peritoneal carcinomatosis.  No bowel obstruction.  Question 1.2 cm endometrial polyp.          PAST MEDICAL & SURGICAL HISTORY:  Essential hypertension  Cancer of breast: Rt. Female  Sleep apnea: Use Oral Device  Obesity (BMI 30.0-34.9)  Seasonal allergies  Acid reflux  S/P breast reconstruction, bilateral  S/P bilateral mastectomy  Knee injury: Left &amp; had surgery about 30 years ago      Allergies    No Known Allergies    Intolerances        MEDICATIONS  (STANDING):  carvedilol 3.125 milliGRAM(s) Oral every 12 hours  docusate sodium 100 milliGRAM(s) Oral daily  enoxaparin Injectable 40 milliGRAM(s) SubCutaneous daily  lisinopril 10 milliGRAM(s) Oral daily  pantoprazole  Injectable 40 milliGRAM(s) IV Push daily  potassium chloride    Tablet ER 20 milliEquivalent(s) Oral once  senna 2 Tablet(s) Oral at bedtime    MEDICATIONS  (PRN):  morphine  - Injectable 2 milliGRAM(s) IV Push every 4 hours PRN Severe Pain (7 - 10)  ondansetron Injectable 4 milliGRAM(s) IV Push every 6 hours PRN Nausea  polyethylene glycol 3350 17 Gram(s) Oral daily PRN Constipation        SOCIAL HISTORY:    FAMILY HISTORY:        PHYSICAL EXAM:    Vital Signs Last 24 Hrs  T(C): 36.7 (13 Sep 2019 11:10), Max: 36.9 (12 Sep 2019 16:30)  T(F): 98 (13 Sep 2019 11:10), Max: 98.5 (12 Sep 2019 16:30)  HR: 72 (13 Sep 2019 11:10) (71 - 77)  BP: 125/71 (13 Sep 2019 04:42) (125/71 - 150/83)  BP(mean): --  RR: 18 (13 Sep 2019 11:10) (18 - 21)  SpO2: 97% (13 Sep 2019 11:10) (95% - 100%)    General:  Appears stated age, well-groomed, well-nourished, no distress  Lungs:  CTAB  Cardiovascular:  good S1, S2,   Abdomen:  Soft, non-tender, non-distended,   Extremities:  no calf tenderness/swelling b/l  Musculoskeletal:  Full ROM in all joints w/o swelling/tenderness/effusion  Neuro/Psych:  A &O x 3    LABS:                        10.8   5.43  )-----------( 314      ( 13 Sep 2019 06:44 )             33.4         141  |  105  |  7   ----------------------------<  90  3.4<L>   |  30  |  0.81    Ca    8.6      13 Sep 2019 06:44  Mg     2.3         TPro  8.1  /  Alb  3.6  /  TBili  0.4  /  DBili  x   /  AST  19  /  ALT  19  /  AlkPhos  103  09-12      Urinalysis Basic - ( 13 Sep 2019 00:55 )    Color: Yellow / Appearance: Clear / S.005 / pH: x  Gluc: x / Ketone: Negative  / Bili: Negative / Urobili: Negative mg/dL   Blood: x / Protein: Negative mg/dL / Nitrite: Negative   Leuk Esterase: Small / RBC: 0-2 /HPF / WBC 26-50   Sq Epi: x / Non Sq Epi: Few / Bacteria: x        RADIOLOGY & ADDITIONAL STUDIES:
REASON FOR CONSULTATION:  Abdominal Distension.  Dyspepsia.  Ascites.  Left Breast Cancer:- Triple Negative  S/P Chemotherapy and Bilateral Mastectomy.  INTERVAL HISTORY:    Left Breast Cancer  Allergies    No Known Allergies    Intolerances        MEDICATIONS  (STANDING):  carvedilol 3.125 milliGRAM(s) Oral every 12 hours  enoxaparin Injectable 40 milliGRAM(s) SubCutaneous daily  lisinopril 10 milliGRAM(s) Oral daily    MEDICATIONS  (PRN):  morphine  - Injectable 2 milliGRAM(s) IV Push every 4 hours PRN Severe Pain (7 - 10)  ondansetron Injectable 4 milliGRAM(s) IV Push every 6 hours PRN Nausea      Vital Signs Last 24 Hrs  T(C): 36.5 (13 Sep 2019 04:42), Max: 36.9 (12 Sep 2019 16:30)  T(F): 97.7 (13 Sep 2019 04:42), Max: 98.5 (12 Sep 2019 16:30)  HR: 71 (13 Sep 2019 04:42) (71 - 77)  BP: 125/71 (13 Sep 2019 04:42) (125/71 - 150/83)  BP(mean): --  RR: 18 (13 Sep 2019 04:42) (18 - 21)  SpO2: 95% (13 Sep 2019 04:42) (95% - 100%)    PHYSICAL EXAM:    EYES: EOMI, PERRLA, conjunctiva and sclera clear  CHEST/LUNG: Clear to percussion bilaterally;   HEART: Regular rate and rhythm;   ABDOMEN:  Ascites   Distension.  LYMPH: No lymphadenopathy noted.        LABS:                        10.8   5.43  )-----------( 314      ( 13 Sep 2019 06:44 )             33.4         141  |  105  |  7   ----------------------------<  90  3.4<L>   |  30  |  0.81    Ca    8.6      13 Sep 2019 06:44  Mg     2.3         TPro  8.1  /  Alb  3.6  /  TBili  0.4  /  DBili  x   /  AST  19  /  ALT  19  /  AlkPhos  103  -12      Urinalysis Basic - ( 13 Sep 2019 00:55 )    Color: Yellow / Appearance: Clear / S.005 / pH: x  Gluc: x / Ketone: Negative  / Bili: Negative / Urobili: Negative mg/dL   Blood: x / Protein: Negative mg/dL / Nitrite: Negative   Leuk Esterase: Small / RBC: 0-2 /HPF / WBC 26-50   Sq Epi: x / Non Sq Epi: Few / Bacteria: x          RADIOLOGY & ADDITIONAL STUDIES:  < from: CT Abdomen and Pelvis w/ IV Cont (19 @ 11:41) >  ended.  REPRODUCTIVE ORGANS: Question 1.2 cm endometrial polyp.    BOWEL: No bowel obstruction. Appendix within normal limits. Several   colonic diverticula.  PERITONEUM: Mild ascites. Omental nodularity.  VESSELS: Within normal limits.  RETROPERITONEUM/LYMPH NODES: No lymphadenopathy. Prominent   retroperitoneal lymph nodes.  ABDOMINAL WALL: Within normal limits.  BONES: Spinal degenerative changes.    IMPRESSION:     Mild ascites.  Omental nodularity, suspicious for peritoneal carcinomatosis.  No bowel obstruction.  Question 1.2 cm endometrial polyp.      < end of copied text >  < from: CT Abdomen and Pelvis w/ IV Cont (19 @ 11:41) >  ended.  REPRODUCTIVE ORGANS: Question 1.2 cm endometrial polyp.    BOWEL: No bowel obstruction. Appendix within normal limits. Several   colonic diverticula.  PERITONEUM: Mild ascites. Omental nodularity.  VESSELS: Within normal limits.  RETROPERITONEUM/LYMPH NODES: No lymphadenopathy. Prominent   retroperitoneal lymph nodes.  ABDOMINAL WALL: Within normal limits.  BONES: Spinal degenerative changes.    IMPRESSION:     Mild ascites.  Omental nodularity, suspicious for peritoneal carcinomatosis.  No bowel obstruction.  Question 1.2 cm endometrial polyp.      < end of copied text >      PATHOLOGY:

## 2019-09-13 NOTE — PROGRESS NOTE ADULT - ASSESSMENT
IMPROVE VTE Individual Risk Assessment          RISK                                                          Points  [  ] Previous VTE                                                3  [  ] Thrombophilia                                             2  [  ] Lower limb paralysis                                   2        (unable to hold up >15 seconds)    [x  ] Current Cancer                                             2         (within 6 months)  [ x ] Immobilization > 24 hrs                              1  [  ] ICU/CCU stay > 24 hours                             1  [  ] Age > 60                                                         1    IMPROVE VTE Score:         [     3    ]    Total Risk Factor Score:    0 - 1:   Consider IPC  >2 - 3:  Thromboprophylaxis required (enoxaparin or SQ heparin)        >4:   High Risk: Thromboprophylaxis required (enoxaparin or SQ heparin), optional add IPC  **If CONTRAINDICATION to enoxaparin or SQ heparin, USE IPCs**    Lovenox  59F hx of breast ca pw abd pain. symptoms x1 month but has never done anything about it until today. associated with nausea and vomiting. no fever, no diarrhea, no constipation. didn't take anything medicationwise. ros neg for ha, vision loss, rhinorrhea, dysuria, numbness, rash, bleeding, Admitted for Carcinomatosis, Possible stage 4 breast ca. Will get Oncology consult. Clear  liquid diet . IV fluids.  09/13/19 : Pain better, No nausea, tolerating clear liquids. Oncology consult noted. For omental biopsy.

## 2019-09-13 NOTE — CONSULT NOTE ADULT - PROBLEM SELECTOR RECOMMENDATION 9
Hx of Breast Cancer.  Tumor Markers.  Bx for Tissue Dx.
-Protonix 40 IV daily   -Advance diet as tolerated   -Avoid NSAIDs
-procedure to be performed today  -meds, labs and vitals reviewed (lovenox to be held today)  -consent obtained from patient

## 2019-09-14 DIAGNOSIS — E87.6 HYPOKALEMIA: ICD-10-CM

## 2019-09-14 LAB
ANION GAP SERPL CALC-SCNC: 5 MMOL/L — SIGNIFICANT CHANGE UP (ref 5–17)
BUN SERPL-MCNC: 7 MG/DL — SIGNIFICANT CHANGE UP (ref 7–23)
CALCIUM SERPL-MCNC: 9 MG/DL — SIGNIFICANT CHANGE UP (ref 8.5–10.1)
CANCER AG27-29 SERPL-ACNC: 77.5 U/ML — HIGH (ref 0–38.6)
CHLORIDE SERPL-SCNC: 104 MMOL/L — SIGNIFICANT CHANGE UP (ref 96–108)
CO2 SERPL-SCNC: 32 MMOL/L — HIGH (ref 22–31)
CREAT SERPL-MCNC: 0.82 MG/DL — SIGNIFICANT CHANGE UP (ref 0.5–1.3)
GLUCOSE SERPL-MCNC: 83 MG/DL — SIGNIFICANT CHANGE UP (ref 70–99)
HCT VFR BLD CALC: 37.2 % — SIGNIFICANT CHANGE UP (ref 34.5–45)
HGB BLD-MCNC: 12.2 G/DL — SIGNIFICANT CHANGE UP (ref 11.5–15.5)
MCHC RBC-ENTMCNC: 31.6 PG — SIGNIFICANT CHANGE UP (ref 27–34)
MCHC RBC-ENTMCNC: 32.8 GM/DL — SIGNIFICANT CHANGE UP (ref 32–36)
MCV RBC AUTO: 96.4 FL — SIGNIFICANT CHANGE UP (ref 80–100)
NRBC # BLD: 0 /100 WBCS — SIGNIFICANT CHANGE UP (ref 0–0)
PLATELET # BLD AUTO: 338 K/UL — SIGNIFICANT CHANGE UP (ref 150–400)
POTASSIUM SERPL-MCNC: 3.3 MMOL/L — LOW (ref 3.5–5.3)
POTASSIUM SERPL-SCNC: 3.3 MMOL/L — LOW (ref 3.5–5.3)
RBC # BLD: 3.86 M/UL — SIGNIFICANT CHANGE UP (ref 3.8–5.2)
RBC # FLD: 12.5 % — SIGNIFICANT CHANGE UP (ref 10.3–14.5)
SODIUM SERPL-SCNC: 141 MMOL/L — SIGNIFICANT CHANGE UP (ref 135–145)
WBC # BLD: 5.72 K/UL — SIGNIFICANT CHANGE UP (ref 3.8–10.5)
WBC # FLD AUTO: 5.72 K/UL — SIGNIFICANT CHANGE UP (ref 3.8–10.5)

## 2019-09-14 RX ORDER — ONDANSETRON 8 MG/1
4 TABLET, FILM COATED ORAL EVERY 6 HOURS
Refills: 0 | Status: DISCONTINUED | OUTPATIENT
Start: 2019-09-14 | End: 2019-09-15

## 2019-09-14 RX ORDER — PANTOPRAZOLE SODIUM 20 MG/1
40 TABLET, DELAYED RELEASE ORAL DAILY
Refills: 0 | Status: DISCONTINUED | OUTPATIENT
Start: 2019-09-14 | End: 2019-09-15

## 2019-09-14 RX ORDER — POTASSIUM CHLORIDE 20 MEQ
20 PACKET (EA) ORAL ONCE
Refills: 0 | Status: COMPLETED | OUTPATIENT
Start: 2019-09-14 | End: 2019-09-14

## 2019-09-14 RX ORDER — CODEINE SULFATE 60 MG/1
15 TABLET ORAL EVERY 6 HOURS
Refills: 0 | Status: DISCONTINUED | OUTPATIENT
Start: 2019-09-14 | End: 2019-09-15

## 2019-09-14 RX ADMIN — LISINOPRIL 10 MILLIGRAM(S): 2.5 TABLET ORAL at 05:58

## 2019-09-14 RX ADMIN — Medication 650 MILLIGRAM(S): at 00:45

## 2019-09-14 RX ADMIN — Medication 100 MILLIGRAM(S): at 12:42

## 2019-09-14 RX ADMIN — CARVEDILOL PHOSPHATE 3.12 MILLIGRAM(S): 80 CAPSULE, EXTENDED RELEASE ORAL at 05:58

## 2019-09-14 RX ADMIN — CARVEDILOL PHOSPHATE 3.12 MILLIGRAM(S): 80 CAPSULE, EXTENDED RELEASE ORAL at 17:28

## 2019-09-14 RX ADMIN — Medication 20 MILLIEQUIVALENT(S): at 09:21

## 2019-09-14 RX ADMIN — SENNA PLUS 2 TABLET(S): 8.6 TABLET ORAL at 21:36

## 2019-09-14 RX ADMIN — PANTOPRAZOLE SODIUM 40 MILLIGRAM(S): 20 TABLET, DELAYED RELEASE ORAL at 12:17

## 2019-09-14 RX ADMIN — Medication 20 MILLIEQUIVALENT(S): at 13:30

## 2019-09-14 RX ADMIN — Medication 650 MILLIGRAM(S): at 00:03

## 2019-09-14 RX ADMIN — ENOXAPARIN SODIUM 40 MILLIGRAM(S): 100 INJECTION SUBCUTANEOUS at 12:15

## 2019-09-14 NOTE — PROGRESS NOTE ADULT - ASSESSMENT
IMPROVE VTE Individual Risk Assessment          RISK                                                          Points  [  ] Previous VTE                                                3  [  ] Thrombophilia                                             2  [  ] Lower limb paralysis                                   2        (unable to hold up >15 seconds)    [x  ] Current Cancer                                             2         (within 6 months)  [ x ] Immobilization > 24 hrs                              1  [  ] ICU/CCU stay > 24 hours                             1  [  ] Age > 60                                                         1    IMPROVE VTE Score:         [     3    ]    Total Risk Factor Score:    0 - 1:   Consider IPC  >2 - 3:  Thromboprophylaxis required (enoxaparin or SQ heparin)        >4:   High Risk: Thromboprophylaxis required (enoxaparin or SQ heparin), optional add IPC  **If CONTRAINDICATION to enoxaparin or SQ heparin, USE IPCs**    Lovenox  59F hx of breast ca pw abd pain. symptoms x1 month but has never done anything about it until today. associated with nausea and vomiting. no fever, no diarrhea, no constipation. didn't take anything medicationwise. ros neg for ha, vision loss, rhinorrhea, dysuria, numbness, rash, bleeding, Admitted for Carcinomatosis, Possible stage 4 breast ca. Will get Oncology consult. Clear  liquid diet . IV fluids.  09/13/19 : Pain better, No nausea, tolerating clear liquids. Oncology consult noted. For omental biopsy.  09/14/19 : Pt. alert. Had small BM. Tolerating full liquid diet, will advance to soft. Pot. supplemented for Hypokalemia. Discharge plan for AM if no N/V. S/P ometal Bx. Tumor markers noted, all elevated.

## 2019-09-14 NOTE — PROGRESS NOTE ADULT - SUBJECTIVE AND OBJECTIVE BOX
Patient is a 59y old  Female who presents with a chief complaint of N/V Pain abdomen (14 Sep 2019 10:12)      INTERVAL HPI/OVERNIGHT EVENTS:    MEDICATIONS  (STANDING):  carvedilol 3.125 milliGRAM(s) Oral every 12 hours  docusate sodium 100 milliGRAM(s) Oral daily  enoxaparin Injectable 40 milliGRAM(s) SubCutaneous daily  lisinopril 10 milliGRAM(s) Oral daily  pantoprazole   Suspension 40 milliGRAM(s) Oral daily  senna 2 Tablet(s) Oral at bedtime    MEDICATIONS  (PRN):  acetaminophen   Tablet .. 650 milliGRAM(s) Oral every 6 hours PRN Temp greater or equal to 38C (100.4F), Mild Pain (1 - 3)  codeine 15 milliGRAM(s) Oral every 6 hours PRN Moderate Pain (4 - 6)  ondansetron    Tablet 4 milliGRAM(s) Oral every 6 hours PRN Nausea and/or Vomiting  polyethylene glycol 3350 17 Gram(s) Oral daily PRN Constipation      Allergies    No Known Allergies    Intolerances        REVIEW OF SYSTEMS:  CONSTITUTIONAL: No fever, weight loss, or fatigue  EYES: No eye pain, visual disturbances, or discharge  ENMT:  No difficulty hearing, tinnitus, vertigo; No sinus or throat pain  NECK: No pain or stiffness  BREASTS: No pain, masses, or nipple discharge  RESPIRATORY: No cough, wheezing, chills or hemoptysis; No shortness of breath  CARDIOVASCULAR: No chest pain, palpitations, dizziness, or leg swelling  GASTROINTESTINAL: No abdominal or epigastric pain. No nausea, vomiting, or hematemesis; No diarrhea or constipation. No melena or hematochezia.  GENITOURINARY: No dysuria, frequency, hematuria, or incontinence  NEUROLOGICAL: No headaches, memory loss, loss of strength, numbness, or tremors  SKIN: No itching, burning, rashes, or lesions   LYMPH NODES: No enlarged glands  ENDOCRINE: No heat or cold intolerance; No hair loss  MUSCULOSKELETAL: No joint pain or swelling; No muscle, back, or extremity pain  PSYCHIATRIC: No depression, anxiety, mood swings, or difficulty sleeping  HEME/LYMPH: No easy bruising, or bleeding gums  ALLERGY AND IMMUNOLOGIC: No hives or eczema    Vital Signs Last 24 Hrs  T(C): 36.6 (14 Sep 2019 04:54), Max: 38.1 (13 Sep 2019 23:32)  T(F): 97.9 (14 Sep 2019 04:54), Max: 100.5 (13 Sep 2019 23:32)  HR: 72 (14 Sep 2019 04:54) (69 - 85)  BP: 129/69 (14 Sep 2019 04:54) (129/69 - 170/81)  BP(mean): --  RR: 18 (14 Sep 2019 04:54) (18 - 18)  SpO2: 97% (14 Sep 2019 04:54) (95% - 99%)    PHYSICAL EXAM:  GENERAL: NAD, well-groomed, well-developed  HEAD:  Atraumatic, Normocephalic  EYES: EOMI, PERRL, conjunctiva and sclera clear  ENMT:  Moist mucous membranes, Good dentition, No lesions  NECK: Supple, No JVD, Normal thyroid  NERVOUS SYSTEM:  Alert & Oriented  X3, Good concentration; Motor Strength 5/5 B/L upper and lower extremities; DTRs 2+ intact and symmetric  CHEST/LUNG: Clear to percussion bilaterally; No rales, rhonchi, wheezing, or rubs  HEART: Regular rate and rhythm; No murmurs, rubs, or gallops  ABDOMEN: Soft, Nontender, Nondistended; Bowel sounds present. Obese.  EXTREMITIES:  2+ Peripheral Pulses, No clubbing, cyanosis, or edema  LYMPH: No lymphadenopathy noted  SKIN: No rashes or lesions    LABS:                        12.2   5.72  )-----------( 338      ( 14 Sep 2019 07:56 )             37.2     09-14    141  |  104  |  7   ----------------------------<  83  3.3<L>   |  32<H>  |  0.82    Ca    9.0      14 Sep 2019 07:56  Mg     2.3     -13    TPro  8.1  /  Alb  3.6  /  TBili  0.4  /  DBili  x   /  AST  19  /  ALT  19  /  AlkPhos  103  09-12    PT/INR - ( 13 Sep 2019 12:46 )   PT: 13.1 sec;   INR: 1.16 ratio         PTT - ( 13 Sep 2019 12:46 )  PTT:32.6 sec  Urinalysis Basic - ( 13 Sep 2019 00:55 )    Color: Yellow / Appearance: Clear / S.005 / pH: x  Gluc: x / Ketone: Negative  / Bili: Negative / Urobili: Negative mg/dL   Blood: x / Protein: Negative mg/dL / Nitrite: Negative   Leuk Esterase: Small / RBC: 0-2 /HPF / WBC 26-50   Sq Epi: x / Non Sq Epi: Few / Bacteria: x      CAPILLARY BLOOD GLUCOSE      RADIOLOGY & ADDITIONAL TESTS:  < from: CT Biopsy Abdomen/Retroperitoneal Mass (19 @ 14:44) >  Impression: Successful CT-guided biopsy of left upper quadrant omental   nodularity.    < end of copied text >    Imaging Personally Reviewed:  [ ] YES  [ ] NO    Consultant(s) Notes Reviewed:  [ ] YES  [ ] NO    Care Discussed with Consultants/Other Providers [ ] YES  [ ] NO    PROBLEMS:  PERITONEAL, CARCINOMATOSIS  UPPER ABDOMINAL PAIN VOMITING  Peritoneal carcinomatosis  Other constipation  Generalized abdominal pain  Obesity (BMI 30.0-34.9)  Obstructive sleep apnea syndrome  Gastroesophageal reflux disease without esophagitis  Malignant neoplasm of left female breast, unspecified estrogen receptor status, unspecified site of breast  Essential hypertension  Peritoneal carcinomatosis      Care discussed with family,         [  ]   yes  [  ]  No    imp:    stable[ ]    unstable[  ]     improving [   ]       unchanged  [  ]                Plans:  Continue present plans  [  ]               New consult [  ]   specialty  .......               order oc[  ]    test name.                  Discharge Planning  [  ]

## 2019-09-14 NOTE — PROGRESS NOTE ADULT - SUBJECTIVE AND OBJECTIVE BOX
INTERVAL History:  Dyspepsia  Abdominal Distension  Ascites++    Allergies    No Known Allergies    Intolerances        MEDICATIONS  (STANDING):  carvedilol 3.125 milliGRAM(s) Oral every 12 hours  docusate sodium 100 milliGRAM(s) Oral daily  enoxaparin Injectable 40 milliGRAM(s) SubCutaneous daily  lisinopril 10 milliGRAM(s) Oral daily  senna 2 Tablet(s) Oral at bedtime    MEDICATIONS  (PRN):  acetaminophen   Tablet .. 650 milliGRAM(s) Oral every 6 hours PRN Temp greater or equal to 38C (100.4F), Mild Pain (1 - 3)  morphine  - Injectable 2 milliGRAM(s) IV Push every 4 hours PRN Severe Pain (7 - 10)  ondansetron Injectable 4 milliGRAM(s) IV Push every 6 hours PRN Nausea  polyethylene glycol 3350 17 Gram(s) Oral daily PRN Constipation      Vital Signs Last 24 Hrs  T(C): 36.6 (14 Sep 2019 04:54), Max: 38.1 (13 Sep 2019 23:32)  T(F): 97.9 (14 Sep 2019 04:54), Max: 100.5 (13 Sep 2019 23:32)  HR: 72 (14 Sep 2019 04:54) (69 - 85)  BP: 129/69 (14 Sep 2019 04:54) (129/69 - 170/81)  BP(mean): --  RR: 18 (14 Sep 2019 04:54) (18 - 18)  SpO2: 97% (14 Sep 2019 04:54) (95% - 99%)    PHYSICAL EXAM:      EYES: EOMI, PERRLA, conjunctiva and sclera clear  NECK: Supple, No JVD, Normal thyroid  CHEST/LUNG: Clear to percussion bilaterally; No rales, rhonchi,   HEART: Regular rate and rhythm;   ABDOMEN:  Ascites++  LYMPH: No lymphadenopathy noted        LABS:                        12.2   5.72  )-----------( 338      ( 14 Sep 2019 07:56 )             37.2     -    141  |  104  |  7   ----------------------------<  83  3.3<L>   |  32<H>  |  0.82    Ca    9.0      14 Sep 2019 07:56  Mg     2.3         TPro  8.1  /  Alb  3.6  /  TBili  0.4  /  DBili  x   /  AST  19  /  ALT  19  /  AlkPhos  103  09-12    PT/INR - ( 13 Sep 2019 12:46 )   PT: 13.1 sec;   INR: 1.16 ratio         PTT - ( 13 Sep 2019 12:46 )  PTT:32.6 sec  Urinalysis Basic - ( 13 Sep 2019 00:55 )    Color: Yellow / Appearance: Clear / S.005 / pH: x  Gluc: x / Ketone: Negative  / Bili: Negative / Urobili: Negative mg/dL   Blood: x / Protein: Negative mg/dL / Nitrite: Negative   Leuk Esterase: Small / RBC: 0-2 /HPF / WBC 26-50   Sq Epi: x / Non Sq Epi: Few / Bacteria: x          RADIOLOGY & ADDITIONAL STUDIES:    PATHOLOGY:

## 2019-09-14 NOTE — PROGRESS NOTE ADULT - SUBJECTIVE AND OBJECTIVE BOX
INTERVAL HPI/OVERNIGHT EVENTS:  Pt. seen and examined at bedside resting comfortably. No acute overnight events. Tolerating full liquid diet, denies N/V. C/o persistent mild abdominal discomfort and bloating.   Denies fever/chills, chest pain, dyspnea, cough, dizziness.     Vital Signs Last 24 Hrs  T(C): 36.6 (14 Sep 2019 04:54), Max: 38.1 (13 Sep 2019 23:32)  T(F): 97.9 (14 Sep 2019 04:54), Max: 100.5 (13 Sep 2019 23:32)  HR: 72 (14 Sep 2019 04:54) (69 - 85)  BP: 129/69 (14 Sep 2019 04:54) (129/69 - 170/81)  RR: 18 (14 Sep 2019 04:54) (18 - 18)  SpO2: 97% (14 Sep 2019 04:54) (95% - 99%)    PHYSICAL EXAM:    GENERAL: NAD  CHEST/LUNG: Clear to ausculation, bilaterally   HEART: S1S2  ABDOMEN: LUQ dressing CDI. Non distended, +BS, soft, Mild LUQ tenderness and left periumbilical tenderness, no guarding  EXTREMITIES:  calf soft, non tender b/l. 2+ distal pulses b/l.     I&O's Detail    13 Sep 2019 07:01  -  14 Sep 2019 07:00  --------------------------------------------------------  IN:    Oral Fluid: 500 mL  Total IN: 500 mL    OUT:  Total OUT: 0 mL    Total NET: 500 mL      LABS:                        12.2   5.72  )-----------( 338      ( 14 Sep 2019 07:56 )             37.2     09-14    141  |  104  |  7   ----------------------------<  83  3.3<L>   |  32<H>  |  0.82    Ca    9.0      14 Sep 2019 07:56  Mg     2.3     -13    TPro  8.1  /  Alb  3.6  /  TBili  0.4  /  DBili  x   /  AST  19  /  ALT  19  /  AlkPhos  103  09-12    PT/INR - ( 13 Sep 2019 12:46 )   PT: 13.1 sec;   INR: 1.16 ratio         PTT - ( 13 Sep 2019 12:46 )  PTT:32.6 sec  Urinalysis Basic - ( 13 Sep 2019 00:55 )    Color: Yellow / Appearance: Clear / S.005 / pH: x  Gluc: x / Ketone: Negative  / Bili: Negative / Urobili: Negative mg/dL   Blood: x / Protein: Negative mg/dL / Nitrite: Negative   Leuk Esterase: Small / RBC: 0-2 /HPF / WBC 26-50   Sq Epi: x / Non Sq Epi: Few / Bacteria: x    A/P: 60yo Female with PMH HTN, Left Breast Ca s/p b/l mastectomy and chemo tx in  now presents c/o abd pain and bloating x1 month, CT shows omental nodularity, suspicious for peritoneal carcinomatosis. No bowel obstruction.  Now s/p IR Left upper quadrant omental nodularity biopsy    elevated    -hypokalemia- repleted  -Oncology consult appreciated, f/u tumor markers  -advance diet as tolerated  -pain control and antiemetic prn  -cont medical management and supportive care  -DVT ppx  -no acute surgical intervention, will f/u pathology for further recommendations  -Discuss with Dr. Poole

## 2019-09-15 ENCOUNTER — TRANSCRIPTION ENCOUNTER (OUTPATIENT)
Age: 59
End: 2019-09-15

## 2019-09-15 VITALS
RESPIRATION RATE: 16 BRPM | DIASTOLIC BLOOD PRESSURE: 88 MMHG | OXYGEN SATURATION: 98 % | HEART RATE: 75 BPM | SYSTOLIC BLOOD PRESSURE: 158 MMHG | TEMPERATURE: 99 F

## 2019-09-15 DIAGNOSIS — C50.919 MALIGNANT NEOPLASM OF UNSPECIFIED SITE OF UNSPECIFIED FEMALE BREAST: ICD-10-CM

## 2019-09-15 LAB
ANION GAP SERPL CALC-SCNC: 5 MMOL/L — SIGNIFICANT CHANGE UP (ref 5–17)
BUN SERPL-MCNC: 6 MG/DL — LOW (ref 7–23)
CALCIUM SERPL-MCNC: 8.6 MG/DL — SIGNIFICANT CHANGE UP (ref 8.5–10.1)
CHLORIDE SERPL-SCNC: 108 MMOL/L — SIGNIFICANT CHANGE UP (ref 96–108)
CO2 SERPL-SCNC: 29 MMOL/L — SIGNIFICANT CHANGE UP (ref 22–31)
CREAT SERPL-MCNC: 0.69 MG/DL — SIGNIFICANT CHANGE UP (ref 0.5–1.3)
GLUCOSE SERPL-MCNC: 94 MG/DL — SIGNIFICANT CHANGE UP (ref 70–99)
POTASSIUM SERPL-MCNC: 3.7 MMOL/L — SIGNIFICANT CHANGE UP (ref 3.5–5.3)
POTASSIUM SERPL-SCNC: 3.7 MMOL/L — SIGNIFICANT CHANGE UP (ref 3.5–5.3)
SODIUM SERPL-SCNC: 142 MMOL/L — SIGNIFICANT CHANGE UP (ref 135–145)

## 2019-09-15 RX ORDER — DOCUSATE SODIUM 100 MG
1 CAPSULE ORAL
Qty: 0 | Refills: 0 | DISCHARGE
Start: 2019-09-15

## 2019-09-15 RX ORDER — POTASSIUM CHLORIDE 20 MEQ
1 PACKET (EA) ORAL
Qty: 90 | Refills: 0
Start: 2019-09-15 | End: 2019-12-13

## 2019-09-15 RX ORDER — ACETAMINOPHEN 500 MG
2 TABLET ORAL
Qty: 0 | Refills: 0 | DISCHARGE
Start: 2019-09-15

## 2019-09-15 RX ADMIN — PANTOPRAZOLE SODIUM 40 MILLIGRAM(S): 20 TABLET, DELAYED RELEASE ORAL at 12:24

## 2019-09-15 RX ADMIN — ENOXAPARIN SODIUM 40 MILLIGRAM(S): 100 INJECTION SUBCUTANEOUS at 12:21

## 2019-09-15 RX ADMIN — CARVEDILOL PHOSPHATE 3.12 MILLIGRAM(S): 80 CAPSULE, EXTENDED RELEASE ORAL at 05:22

## 2019-09-15 RX ADMIN — LISINOPRIL 10 MILLIGRAM(S): 2.5 TABLET ORAL at 06:17

## 2019-09-15 RX ADMIN — Medication 100 MILLIGRAM(S): at 12:21

## 2019-09-15 NOTE — DISCHARGE NOTE PROVIDER - NSDCCPCAREPLAN_GEN_ALL_CORE_FT
PRINCIPAL DISCHARGE DIAGNOSIS  Diagnosis: Peritoneal carcinomatosis  Assessment and Plan of Treatment:

## 2019-09-15 NOTE — PROGRESS NOTE ADULT - SUBJECTIVE AND OBJECTIVE BOX
INTERVAL History:  Abdominal pain.  Nausea    Allergies    No Known Allergies    Intolerances        MEDICATIONS  (STANDING):  carvedilol 3.125 milliGRAM(s) Oral every 12 hours  docusate sodium 100 milliGRAM(s) Oral daily  enoxaparin Injectable 40 milliGRAM(s) SubCutaneous daily  lisinopril 10 milliGRAM(s) Oral daily  pantoprazole   Suspension 40 milliGRAM(s) Oral daily  senna 2 Tablet(s) Oral at bedtime    MEDICATIONS  (PRN):  acetaminophen   Tablet .. 650 milliGRAM(s) Oral every 6 hours PRN Temp greater or equal to 38C (100.4F), Mild Pain (1 - 3)  codeine 15 milliGRAM(s) Oral every 6 hours PRN Moderate Pain (4 - 6)  ondansetron    Tablet 4 milliGRAM(s) Oral every 6 hours PRN Nausea and/or Vomiting  polyethylene glycol 3350 17 Gram(s) Oral daily PRN Constipation      Vital Signs Last 24 Hrs  T(C): 36.7 (15 Sep 2019 05:17), Max: 37.6 (14 Sep 2019 23:52)  T(F): 98 (15 Sep 2019 05:17), Max: 99.6 (14 Sep 2019 23:52)  HR: 83 (15 Sep 2019 05:17) (80 - 91)  BP: 144/69 (15 Sep 2019 05:17) (144/69 - 157/86)  BP(mean): --  RR: 17 (15 Sep 2019 05:17) (17 - 18)  SpO2: 95% (15 Sep 2019 05:17) (93% - 97%)    PHYSICAL EXAM:      EYES: EOMI, PERRLA, conjunctiva and sclera clear  NECK: Supple, No JVD, Normal thyroid  CHEST/LUNG: Clear to percussion bilaterally; No rales, rhonchi,   HEART: Regular rate and rhythm;   ABDOMEN:   Distended,  Tender        LABS:                        12.2   5.72  )-----------( 338      ( 14 Sep 2019 07:56 )             37.2     09-15    142  |  108  |  6<L>  ----------------------------<  94  3.7   |  29  |  0.69    Ca    8.6      15 Sep 2019 07:32      PT/INR - ( 13 Sep 2019 12:46 )   PT: 13.1 sec;   INR: 1.16 ratio         PTT - ( 13 Sep 2019 12:46 )  PTT:32.6 sec        RADIOLOGY & ADDITIONAL STUDIES:    PATHOLOGY:

## 2019-09-15 NOTE — PROGRESS NOTE ADULT - PROBLEM SELECTOR PROBLEM 1
Malignant neoplasm of left female breast, unspecified estrogen receptor status, unspecified site of breast
Peritoneal carcinomatosis

## 2019-09-15 NOTE — PROGRESS NOTE ADULT - SUBJECTIVE AND OBJECTIVE BOX
Patient is a 59y old  Female who presents with a chief complaint of N/V Pain abdomen (15 Sep 2019 09:58)      INTERVAL HPI/OVERNIGHT EVENTS:    MEDICATIONS  (STANDING):  carvedilol 3.125 milliGRAM(s) Oral every 12 hours  docusate sodium 100 milliGRAM(s) Oral daily  enoxaparin Injectable 40 milliGRAM(s) SubCutaneous daily  lisinopril 10 milliGRAM(s) Oral daily  pantoprazole   Suspension 40 milliGRAM(s) Oral daily  senna 2 Tablet(s) Oral at bedtime    MEDICATIONS  (PRN):  acetaminophen   Tablet .. 650 milliGRAM(s) Oral every 6 hours PRN Temp greater or equal to 38C (100.4F), Mild Pain (1 - 3)  codeine 15 milliGRAM(s) Oral every 6 hours PRN Moderate Pain (4 - 6)  ondansetron    Tablet 4 milliGRAM(s) Oral every 6 hours PRN Nausea and/or Vomiting  polyethylene glycol 3350 17 Gram(s) Oral daily PRN Constipation      Allergies    No Known Allergies    Intolerances        REVIEW OF SYSTEMS:  CONSTITUTIONAL: No fever, weight loss, or fatigue  EYES: No eye pain, visual disturbances, or discharge  ENMT:  No difficulty hearing, tinnitus, vertigo; No sinus or throat pain  NECK: No pain or stiffness  BREASTS: No pain, masses, or nipple discharge  RESPIRATORY: No cough, wheezing, chills or hemoptysis; No shortness of breath  CARDIOVASCULAR: No chest pain, palpitations, dizziness, or leg swelling  GASTROINTESTINAL: No abdominal or epigastric pain. No nausea, vomiting, or hematemesis; No diarrhea or constipation. No melena or hematochezia.  GENITOURINARY: No dysuria, frequency, hematuria, or incontinence  NEUROLOGICAL: No headaches, memory loss, loss of strength, numbness, or tremors  SKIN: No itching, burning, rashes, or lesions   LYMPH NODES: No enlarged glands  ENDOCRINE: No heat or cold intolerance; No hair loss  MUSCULOSKELETAL: No joint pain or swelling; No muscle, back, or extremity pain  PSYCHIATRIC: No depression, anxiety, mood swings, or difficulty sleeping  HEME/LYMPH: No easy bruising, or bleeding gums  ALLERGY AND IMMUNOLOGIC: No hives or eczema    Vital Signs Last 24 Hrs  T(C): 36.7 (15 Sep 2019 05:17), Max: 37.6 (14 Sep 2019 23:52)  T(F): 98 (15 Sep 2019 05:17), Max: 99.6 (14 Sep 2019 23:52)  HR: 83 (15 Sep 2019 05:17) (80 - 91)  BP: 144/69 (15 Sep 2019 05:17) (144/69 - 157/86)  BP(mean): --  RR: 17 (15 Sep 2019 05:17) (17 - 18)  SpO2: 95% (15 Sep 2019 05:17) (93% - 97%)    PHYSICAL EXAM:  GENERAL: NAD, well-groomed, well-developed  HEAD:  Atraumatic, Normocephalic  EYES: EOMI, PERRL, conjunctiva and sclera clear  ENMT:  Moist mucous membranes, Good dentition, No lesions  NECK: Supple, No JVD, Normal thyroid  NERVOUS SYSTEM:  Alert & Oriented X3, Good concentration; Motor Strength 5/5 B/L upper and lower extremities; DTRs 2+ intact and symmetric  CHEST/LUNG: Clear to percussion bilaterally; No rales, rhonchi, wheezing, or rubs  HEART: Regular rate and rhythm; No murmurs, rubs, or gallops  ABDOMEN: Soft, Nontender, Nondistended; Bowel sounds present  EXTREMITIES:  2+ Peripheral Pulses, No clubbing, cyanosis, or edema  LYMPH: No lymphadenopathy noted  SKIN: No rashes or lesions    LABS:                        12.2   5.72  )-----------( 338      ( 14 Sep 2019 07:56 )             37.2     09-15    142  |  108  |  6<L>  ----------------------------<  94  3.7   |  29  |  0.69    Ca    8.6      15 Sep 2019 07:32      PT/INR - ( 13 Sep 2019 12:46 )   PT: 13.1 sec;   INR: 1.16 ratio         PTT - ( 13 Sep 2019 12:46 )  PTT:32.6 sec    CAPILLARY BLOOD GLUCOSE        RADIOLOGY & ADDITIONAL TESTS:  < from: CT Abdomen and Pelvis w/ IV Cont (09.12.19 @ 11:41) >    EXAM:  CT ABDOMEN AND PELVIS IC                            PROCEDURE DATE:  09/12/2019          INTERPRETATION:  CLINICAL INFORMATION: Left abdominal pain    COMPARISON: None.    PROCEDURE:   CT of the Abdomen and Pelvis was performed with intravenous contrast.   Intravenous contrast: 90 ml Omnipaque 350. 10 ml discarded.  Oral contrast: None.  Sagittal and coronal reformats were performed.    FINDINGS:    LOWER CHEST: Bilateral breast implants. Basilar atelectasis.    LIVER: Within normal limits.  BILE DUCTS: Normal caliber.  GALLBLADDER: Within normal limits.  SPLEEN: Within normal limits.  PANCREAS: Within normal limits.  ADRENALS: Within normal limits.  KIDNEYS/URETERS: Within normal limits.    BLADDER: Minimally distended.  REPRODUCTIVE ORGANS: Question 1.2 cm endometrial polyp.    BOWEL: No bowel obstruction. Appendix within normal limits. Several   colonic diverticula.  PERITONEUM: Mild ascites. Omental nodularity.  VESSELS: Within normal limits.  RETROPERITONEUM/LYMPH NODES: No lymphadenopathy. Prominent   retroperitoneal lymph nodes.  ABDOMINAL WALL: Within normal limits.  BONES: Spinal degenerative changes.    IMPRESSION:     Mild ascites.  Omental nodularity, suspicious for peritoneal carcinomatosis.  No bowel obstruction.  Question 1.2 cm endometrial polyp.      VERA GARCIA M.D., ATTENDING RADIOLOGIST  This document has been electronically signed. Sep 12 2019 11:58AM                < end of copied text >    Imaging Personally Reviewed:  [ ] YES  [ ] NO    Consultant(s) Notes Reviewed:  [ ] YES  [ ] NO    Care Discussed with Consultants/Other Providers [ ] YES  [ ] NO    PROBLEMS:  PERITONEAL, CARCINOMATOSIS  UPPER ABDOMINAL PAIN VOMITING  Peritoneal carcinomatosis  Cancer of breast  Hypokalemia due to loss of potassium  Other constipation  Generalized abdominal pain  Obesity (BMI 30.0-34.9)  Obstructive sleep apnea syndrome  Gastroesophageal reflux disease without esophagitis  Malignant neoplasm of left female breast, unspecified estrogen receptor status, unspecified site of breast  Essential hypertension  Peritoneal carcinomatosis      Care discussed with family,         [  ]   yes  [  ]  No    imp:    stable[ ]    unstable[  ]     improving [   ]       unchanged  [  ]                Plans:  Continue present plans  [  ]               New consult [  ]   specialty  .......               order oc[  ]    test name.                  Discharge Planning  [  ]

## 2019-09-15 NOTE — DISCHARGE NOTE NURSING/CASE MANAGEMENT/SOCIAL WORK - NSDCPNINST_GEN_ALL_CORE
Pt verbalized understanding of discharge instructions, medications, and scheduling follow up appointments with MD.

## 2019-09-15 NOTE — PROGRESS NOTE ADULT - PROBLEM SELECTOR PLAN 2
R/O Metastases v/s Mullerian Carcinoma.  Bx Done.  Check Path.  Office follow up.
Left Breast Cancer:- Triple neg  S/P Adjuvant Therapy.
Monitor BP. Meds ordered

## 2019-09-15 NOTE — DISCHARGE NOTE PROVIDER - CARE PROVIDER_API CALL
Jessica Avery (MD)  Medical Oncology  [address], [address]  [place] [address]  Phone: [phone]  Fax: [phone]  Follow Up Time:

## 2019-09-15 NOTE — PROGRESS NOTE ADULT - PROBLEM SELECTOR PROBLEM 2
Peritoneal carcinomatosis
Cancer of breast
Essential hypertension
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
## 2019-09-15 NOTE — PROGRESS NOTE ADULT - ASSESSMENT
IMPROVE VTE Individual Risk Assessment          RISK                                                          Points  [  ] Previous VTE                                                3  [  ] Thrombophilia                                             2  [  ] Lower limb paralysis                                   2        (unable to hold up >15 seconds)    [x  ] Current Cancer                                             2         (within 6 months)  [ x ] Immobilization > 24 hrs                              1  [  ] ICU/CCU stay > 24 hours                             1  [  ] Age > 60                                                         1    IMPROVE VTE Score:         [     3    ]    Total Risk Factor Score:    0 - 1:   Consider IPC  >2 - 3:  Thromboprophylaxis required (enoxaparin or SQ heparin)        >4:   High Risk: Thromboprophylaxis required (enoxaparin or SQ heparin), optional add IPC  **If CONTRAINDICATION to enoxaparin or SQ heparin, USE IPCs**    Lovenox  59F hx of breast ca pw abd pain. symptoms x1 month but has never done anything about it until today. associated with nausea and vomiting. no fever, no diarrhea, no constipation. didn't take anything medicationwise. ros neg for ha, vision loss, rhinorrhea, dysuria, numbness, rash, bleeding, Admitted for Carcinomatosis, Possible stage 4 breast ca. Will get Oncology consult. Clear  liquid diet . IV fluids.  09/13/19 : Pain better, No nausea, tolerating clear liquids. Oncology consult noted. For omental biopsy.  09/14/19 : Pt. alert. Had small BM. Tolerating full liquid diet, will advance to soft. Pot. supplemented for Hypokalemia. Discharge plan for AM if no N/V. S/P ometal Bx. Tumor markers noted, all elevated.   09/15/19 : Pt awake, alert. Pot. normalized. Tolerating diet. Occ. cramps in the abdomen. No Nausea, no vomiting. Discharge home. F/U with Gyn for ? endocervical polyp  and Oncology.

## 2019-09-15 NOTE — DISCHARGE NOTE PROVIDER - HOSPITAL COURSE
59F hx of breast ca pw abd pain. symptoms x1 month but has never done anything about it until today. associated with nausea and vomiting. no fever, no diarrhea, no constipation. didn't take anything medicationwise. ros neg for ha, vision loss, rhinorrhea, dysuria, numbness, rash, bleeding, Admitted for Carcinomatosis, Possible stage 4 breast ca. Will get Oncology consult. Clear  liquid diet . IV fluids.    09/13/19 : Pain better, No nausea, tolerating clear liquids. Oncology consult noted. For omental biopsy.    09/14/19 : Pt. alert. Had small BM. Tolerating full liquid diet, will advance to soft. Pot. supplemented for Hypokalemia. Discharge plan for AM if no N/V. S/P ometal Bx. Tumor markers noted, all elevated.     09/15/19 : Pt awake, alert. Pot. normalized. Tolerating diet. Occ. cramps in the abdomen. No Nausea, no vomiting. Discharge home. F/U with Gyn for ? endocervical polyp  and Oncology.

## 2019-09-15 NOTE — PROGRESS NOTE ADULT - REASON FOR ADMISSION
N/V Pain abdomen

## 2019-09-15 NOTE — PROGRESS NOTE ADULT - SUBJECTIVE AND OBJECTIVE BOX
Surgery NP note  Patient seen and examined bedside resting comfortably without new complaints. Tolerating regular diet. Denies n/v. Abdominal discomfort around bx site is well controlled.  Normal flatus/BM.   Denies fever, chills, chest pain, dyspnea, cough, dizziness.     T(F): 98 (09-15-19 @ 05:17), Max: 99.6 (09-14-19 @ 23:52)  HR: 83 (09-15-19 @ 05:17) (80 - 91)  BP: 144/69 (09-15-19 @ 05:17) (144/69 - 157/86)  RR: 17 (09-15-19 @ 05:17) (17 - 18)  SpO2: 95% (09-15-19 @ 05:17) (93% - 97%)    PHYSICAL EXAM:  General: NAD, WDWN.   Neuro:  Alert & responsive  CV: RRR  Abdomen: LUQ dressing, without erythema or drainage from site. Nondistended. + BS. Mild TTP around bx site. Soft, without guarding   Extremities: no pedal edema noted     LABS:                        12.2   5.72  )-----------( 338      ( 14 Sep 2019 07:56 )             37.2     09-15    142  |  108  |  6<L>  ----------------------------<  94  3.7   |  29  |  0.69    Ca    8.6      15 Sep 2019 07:32        PT/INR - ( 13 Sep 2019 12:46 )   PT: 13.1 sec;   INR: 1.16 ratio         PTT - ( 13 Sep 2019 12:46 )  PTT:32.6 sec  I&O's Detail    14 Sep 2019 07:01  -  15 Sep 2019 07:00  --------------------------------------------------------  IN:    Oral Fluid: 360 mL  Total IN: 360 mL    OUT:  Total OUT: 0 mL    Total NET: 360 mL    Impression: 59y Female with PMH HTN, GERD, TORREY, and Left breast CA s/p b/l mastectomy and chemo tx, now s/p LUQ omental nodularity biopsy (9/13) seen comfortably at bedside without new complaints.  elevated. Patient clinically stable from surgical perspective.       Plan:  - F/u morning labs  - F/u biopsy results   - No acute surgical intervention at this time, f/u outpatient as needed

## 2019-09-15 NOTE — DISCHARGE NOTE NURSING/CASE MANAGEMENT/SOCIAL WORK - PATIENT PORTAL LINK FT
You can access the FollowMyHealth Patient Portal offered by [facility] by registering at the following website: [email or web address]. By joining [employer]’s FollowMyHealth portal, you will also be able to view your health information using other applications (apps) compatible with our system.

## 2019-09-15 NOTE — DISCHARGE NOTE PROVIDER - NSCORESITESY/N_GEN_A_CORE_RD
Yes Bcc Macronodular Histology Text: The dermis contains distinctive tumor cell masses of various shapes and sizes composed of cells with large oval or elongated nuclei with relatively little cytoplasm.  The nuclei are homogenous.  There is no pronounced variation in size or intensity of staining and no significant anaplastic appearance.  The cells at the outer aspect of the tumor masses show palisading of nuclei.  Tumor masses are surrounded by a connective tissue stroma and in some areas there is retraction artifact of the tumor nodule away from the surrounding stroma.  A macronodular histology is noted.

## 2019-09-16 LAB — SURGICAL PATHOLOGY STUDY: SIGNIFICANT CHANGE UP

## 2019-09-19 DIAGNOSIS — I10 ESSENTIAL (PRIMARY) HYPERTENSION: ICD-10-CM

## 2019-09-19 DIAGNOSIS — C50.912 MALIGNANT NEOPLASM OF UNSPECIFIED SITE OF LEFT FEMALE BREAST: ICD-10-CM

## 2019-09-19 DIAGNOSIS — G47.33 OBSTRUCTIVE SLEEP APNEA (ADULT) (PEDIATRIC): ICD-10-CM

## 2019-09-19 DIAGNOSIS — K59.09 OTHER CONSTIPATION: ICD-10-CM

## 2019-09-19 DIAGNOSIS — E66.9 OBESITY, UNSPECIFIED: ICD-10-CM

## 2019-09-19 DIAGNOSIS — J30.2 OTHER SEASONAL ALLERGIC RHINITIS: ICD-10-CM

## 2019-09-19 DIAGNOSIS — R10.9 UNSPECIFIED ABDOMINAL PAIN: ICD-10-CM

## 2019-09-19 DIAGNOSIS — Z90.13 ACQUIRED ABSENCE OF BILATERAL BREASTS AND NIPPLES: ICD-10-CM

## 2019-09-19 DIAGNOSIS — K21.9 GASTRO-ESOPHAGEAL REFLUX DISEASE WITHOUT ESOPHAGITIS: ICD-10-CM

## 2019-09-19 DIAGNOSIS — Z92.21 PERSONAL HISTORY OF ANTINEOPLASTIC CHEMOTHERAPY: ICD-10-CM

## 2019-09-19 DIAGNOSIS — E87.6 HYPOKALEMIA: ICD-10-CM

## 2019-09-19 DIAGNOSIS — C78.6 SECONDARY MALIGNANT NEOPLASM OF RETROPERITONEUM AND PERITONEUM: ICD-10-CM

## 2020-08-28 ENCOUNTER — INPATIENT (INPATIENT)
Facility: HOSPITAL | Age: 60
LOS: 5 days | Discharge: HOME | End: 2020-09-03
Attending: SURGERY | Admitting: SURGERY
Payer: COMMERCIAL

## 2020-08-28 VITALS
TEMPERATURE: 99 F | RESPIRATION RATE: 20 BRPM | DIASTOLIC BLOOD PRESSURE: 94 MMHG | WEIGHT: 175.93 LBS | HEART RATE: 91 BPM | OXYGEN SATURATION: 98 % | SYSTOLIC BLOOD PRESSURE: 168 MMHG | HEIGHT: 67 IN

## 2020-08-28 DIAGNOSIS — K56.50 INTESTINAL ADHESIONS [BANDS], UNSPECIFIED AS TO PARTIAL VERSUS COMPLETE OBSTRUCTION: ICD-10-CM

## 2020-08-28 DIAGNOSIS — K56.609 UNSPECIFIED INTESTINAL OBSTRUCTION, UNSPECIFIED AS TO PARTIAL VERSUS COMPLETE OBSTRUCTION: ICD-10-CM

## 2020-08-28 DIAGNOSIS — Z90.13 ACQUIRED ABSENCE OF BILATERAL BREASTS AND NIPPLES: ICD-10-CM

## 2020-08-28 DIAGNOSIS — K21.9 GASTRO-ESOPHAGEAL REFLUX DISEASE WITHOUT ESOPHAGITIS: ICD-10-CM

## 2020-08-28 DIAGNOSIS — I10 ESSENTIAL (PRIMARY) HYPERTENSION: ICD-10-CM

## 2020-08-28 DIAGNOSIS — Z98.890 OTHER SPECIFIED POSTPROCEDURAL STATES: Chronic | ICD-10-CM

## 2020-08-28 DIAGNOSIS — C55 MALIGNANT NEOPLASM OF UTERUS, PART UNSPECIFIED: ICD-10-CM

## 2020-08-28 DIAGNOSIS — Z85.89 PERSONAL HISTORY OF MALIGNANT NEOPLASM OF OTHER ORGANS AND SYSTEMS: ICD-10-CM

## 2020-08-28 DIAGNOSIS — Z90.710 ACQUIRED ABSENCE OF BOTH CERVIX AND UTERUS: Chronic | ICD-10-CM

## 2020-08-28 DIAGNOSIS — S89.90XA UNSPECIFIED INJURY OF UNSPECIFIED LOWER LEG, INITIAL ENCOUNTER: Chronic | ICD-10-CM

## 2020-08-28 DIAGNOSIS — Z85.3 PERSONAL HISTORY OF MALIGNANT NEOPLASM OF BREAST: ICD-10-CM

## 2020-08-28 DIAGNOSIS — Z98.82 BREAST IMPLANT STATUS: ICD-10-CM

## 2020-08-28 DIAGNOSIS — Z92.21 PERSONAL HISTORY OF ANTINEOPLASTIC CHEMOTHERAPY: ICD-10-CM

## 2020-08-28 DIAGNOSIS — E44.0 MODERATE PROTEIN-CALORIE MALNUTRITION: ICD-10-CM

## 2020-08-28 DIAGNOSIS — Z90.710 ACQUIRED ABSENCE OF BOTH CERVIX AND UTERUS: ICD-10-CM

## 2020-08-28 DIAGNOSIS — E87.6 HYPOKALEMIA: ICD-10-CM

## 2020-08-28 DIAGNOSIS — Z90.13 ACQUIRED ABSENCE OF BILATERAL BREASTS AND NIPPLES: Chronic | ICD-10-CM

## 2020-08-28 DIAGNOSIS — Z29.9 ENCOUNTER FOR PROPHYLACTIC MEASURES, UNSPECIFIED: ICD-10-CM

## 2020-08-28 DIAGNOSIS — C50.919 MALIGNANT NEOPLASM OF UNSPECIFIED SITE OF UNSPECIFIED FEMALE BREAST: ICD-10-CM

## 2020-08-28 DIAGNOSIS — Z92.3 PERSONAL HISTORY OF IRRADIATION: ICD-10-CM

## 2020-08-28 LAB
ALBUMIN SERPL ELPH-MCNC: 3.3 G/DL — SIGNIFICANT CHANGE UP (ref 3.3–5)
ALBUMIN SERPL ELPH-MCNC: 3.4 G/DL — SIGNIFICANT CHANGE UP (ref 3.3–5)
ALP SERPL-CCNC: 56 U/L — SIGNIFICANT CHANGE UP (ref 40–120)
ALP SERPL-CCNC: 59 U/L — SIGNIFICANT CHANGE UP (ref 40–120)
ALT FLD-CCNC: 14 U/L — SIGNIFICANT CHANGE UP (ref 12–78)
ALT FLD-CCNC: 15 U/L — SIGNIFICANT CHANGE UP (ref 12–78)
ANION GAP SERPL CALC-SCNC: 5 MMOL/L — SIGNIFICANT CHANGE UP (ref 5–17)
ANION GAP SERPL CALC-SCNC: 9 MMOL/L — SIGNIFICANT CHANGE UP (ref 5–17)
APPEARANCE UR: CLEAR — SIGNIFICANT CHANGE UP
APTT BLD: 29.1 SEC — SIGNIFICANT CHANGE UP (ref 27.5–35.5)
AST SERPL-CCNC: 17 U/L — SIGNIFICANT CHANGE UP (ref 15–37)
AST SERPL-CCNC: 17 U/L — SIGNIFICANT CHANGE UP (ref 15–37)
BACTERIA # UR AUTO: ABNORMAL
BASOPHILS # BLD AUTO: 0 K/UL — SIGNIFICANT CHANGE UP (ref 0–0.2)
BASOPHILS NFR BLD AUTO: 0 % — SIGNIFICANT CHANGE UP (ref 0–2)
BILIRUB SERPL-MCNC: 0.5 MG/DL — SIGNIFICANT CHANGE UP (ref 0.2–1.2)
BILIRUB SERPL-MCNC: 0.5 MG/DL — SIGNIFICANT CHANGE UP (ref 0.2–1.2)
BILIRUB UR-MCNC: NEGATIVE — SIGNIFICANT CHANGE UP
BLD GP AB SCN SERPL QL: SIGNIFICANT CHANGE UP
BUN SERPL-MCNC: 12 MG/DL — SIGNIFICANT CHANGE UP (ref 7–23)
BUN SERPL-MCNC: 8 MG/DL — SIGNIFICANT CHANGE UP (ref 7–23)
CALCIUM SERPL-MCNC: 9.1 MG/DL — SIGNIFICANT CHANGE UP (ref 8.5–10.1)
CALCIUM SERPL-MCNC: 9.2 MG/DL — SIGNIFICANT CHANGE UP (ref 8.5–10.1)
CHLORIDE SERPL-SCNC: 104 MMOL/L — SIGNIFICANT CHANGE UP (ref 96–108)
CHLORIDE SERPL-SCNC: 104 MMOL/L — SIGNIFICANT CHANGE UP (ref 96–108)
CK MB CFR SERPL CALC: <1 NG/ML — SIGNIFICANT CHANGE UP (ref 0.5–3.6)
CO2 SERPL-SCNC: 27 MMOL/L — SIGNIFICANT CHANGE UP (ref 22–31)
CO2 SERPL-SCNC: 29 MMOL/L — SIGNIFICANT CHANGE UP (ref 22–31)
COLOR SPEC: YELLOW — SIGNIFICANT CHANGE UP
CREAT SERPL-MCNC: 0.79 MG/DL — SIGNIFICANT CHANGE UP (ref 0.5–1.3)
CREAT SERPL-MCNC: 0.95 MG/DL — SIGNIFICANT CHANGE UP (ref 0.5–1.3)
DIFF PNL FLD: ABNORMAL
EOSINOPHIL # BLD AUTO: 0 K/UL — SIGNIFICANT CHANGE UP (ref 0–0.5)
EOSINOPHIL NFR BLD AUTO: 0 % — SIGNIFICANT CHANGE UP (ref 0–6)
EPI CELLS # UR: SIGNIFICANT CHANGE UP
GLUCOSE SERPL-MCNC: 110 MG/DL — HIGH (ref 70–99)
GLUCOSE SERPL-MCNC: 116 MG/DL — HIGH (ref 70–99)
GLUCOSE UR QL: NEGATIVE MG/DL — SIGNIFICANT CHANGE UP
HCG SERPL-ACNC: <1 MIU/ML — SIGNIFICANT CHANGE UP
HCT VFR BLD CALC: 34.7 % — SIGNIFICANT CHANGE UP (ref 34.5–45)
HCT VFR BLD CALC: 35.4 % — SIGNIFICANT CHANGE UP (ref 34.5–45)
HGB BLD-MCNC: 11.8 G/DL — SIGNIFICANT CHANGE UP (ref 11.5–15.5)
HGB BLD-MCNC: 12.1 G/DL — SIGNIFICANT CHANGE UP (ref 11.5–15.5)
IMM GRANULOCYTES NFR BLD AUTO: 0.3 % — SIGNIFICANT CHANGE UP (ref 0–1.5)
INR BLD: 1.18 RATIO — HIGH (ref 0.88–1.16)
KETONES UR-MCNC: NEGATIVE — SIGNIFICANT CHANGE UP
LACTATE SERPL-SCNC: 0.9 MMOL/L — SIGNIFICANT CHANGE UP (ref 0.7–2)
LEUKOCYTE ESTERASE UR-ACNC: ABNORMAL
LIDOCAIN IGE QN: 81 U/L — SIGNIFICANT CHANGE UP (ref 73–393)
LYMPHOCYTES # BLD AUTO: 0.57 K/UL — LOW (ref 1–3.3)
LYMPHOCYTES # BLD AUTO: 9.9 % — LOW (ref 13–44)
MAGNESIUM SERPL-MCNC: 2 MG/DL — SIGNIFICANT CHANGE UP (ref 1.6–2.6)
MCHC RBC-ENTMCNC: 33.4 PG — SIGNIFICANT CHANGE UP (ref 27–34)
MCHC RBC-ENTMCNC: 33.6 PG — SIGNIFICANT CHANGE UP (ref 27–34)
MCHC RBC-ENTMCNC: 34 GM/DL — SIGNIFICANT CHANGE UP (ref 32–36)
MCHC RBC-ENTMCNC: 34.2 GM/DL — SIGNIFICANT CHANGE UP (ref 32–36)
MCV RBC AUTO: 97.8 FL — SIGNIFICANT CHANGE UP (ref 80–100)
MCV RBC AUTO: 98.9 FL — SIGNIFICANT CHANGE UP (ref 80–100)
MONOCYTES # BLD AUTO: 0.24 K/UL — SIGNIFICANT CHANGE UP (ref 0–0.9)
MONOCYTES NFR BLD AUTO: 4.2 % — SIGNIFICANT CHANGE UP (ref 2–14)
NEUTROPHILS # BLD AUTO: 4.95 K/UL — SIGNIFICANT CHANGE UP (ref 1.8–7.4)
NEUTROPHILS NFR BLD AUTO: 85.6 % — HIGH (ref 43–77)
NITRITE UR-MCNC: NEGATIVE — SIGNIFICANT CHANGE UP
NRBC # BLD: 0 /100 WBCS — SIGNIFICANT CHANGE UP (ref 0–0)
NRBC # BLD: 0 /100 WBCS — SIGNIFICANT CHANGE UP (ref 0–0)
PH UR: 5 — SIGNIFICANT CHANGE UP (ref 5–8)
PHOSPHATE SERPL-MCNC: 2.8 MG/DL — SIGNIFICANT CHANGE UP (ref 2.5–4.5)
PLATELET # BLD AUTO: 202 K/UL — SIGNIFICANT CHANGE UP (ref 150–400)
PLATELET # BLD AUTO: 214 K/UL — SIGNIFICANT CHANGE UP (ref 150–400)
POTASSIUM SERPL-MCNC: 3.8 MMOL/L — SIGNIFICANT CHANGE UP (ref 3.5–5.3)
POTASSIUM SERPL-MCNC: 3.9 MMOL/L — SIGNIFICANT CHANGE UP (ref 3.5–5.3)
POTASSIUM SERPL-SCNC: 3.8 MMOL/L — SIGNIFICANT CHANGE UP (ref 3.5–5.3)
POTASSIUM SERPL-SCNC: 3.9 MMOL/L — SIGNIFICANT CHANGE UP (ref 3.5–5.3)
PROT SERPL-MCNC: 7.2 GM/DL — SIGNIFICANT CHANGE UP (ref 6–8.3)
PROT SERPL-MCNC: 7.8 GM/DL — SIGNIFICANT CHANGE UP (ref 6–8.3)
PROT UR-MCNC: 15 MG/DL
PROTHROM AB SERPL-ACNC: 13.6 SEC — SIGNIFICANT CHANGE UP (ref 10.6–13.6)
RBC # BLD: 3.51 M/UL — LOW (ref 3.8–5.2)
RBC # BLD: 3.62 M/UL — LOW (ref 3.8–5.2)
RBC # FLD: 13.7 % — SIGNIFICANT CHANGE UP (ref 10.3–14.5)
RBC # FLD: 14 % — SIGNIFICANT CHANGE UP (ref 10.3–14.5)
RBC CASTS # UR COMP ASSIST: SIGNIFICANT CHANGE UP /HPF (ref 0–4)
SARS-COV-2 IGG SERPL QL IA: POSITIVE
SARS-COV-2 IGM SERPL IA-ACNC: 28.1 INDEX — HIGH
SARS-COV-2 RNA SPEC QL NAA+PROBE: SIGNIFICANT CHANGE UP
SODIUM SERPL-SCNC: 138 MMOL/L — SIGNIFICANT CHANGE UP (ref 135–145)
SODIUM SERPL-SCNC: 140 MMOL/L — SIGNIFICANT CHANGE UP (ref 135–145)
SP GR SPEC: 1.01 — SIGNIFICANT CHANGE UP (ref 1.01–1.02)
TROPONIN I SERPL-MCNC: <.015 NG/ML — SIGNIFICANT CHANGE UP (ref 0.01–0.04)
UROBILINOGEN FLD QL: NEGATIVE MG/DL — SIGNIFICANT CHANGE UP
WBC # BLD: 5.78 K/UL — SIGNIFICANT CHANGE UP (ref 3.8–10.5)
WBC # BLD: 6.66 K/UL — SIGNIFICANT CHANGE UP (ref 3.8–10.5)
WBC # FLD AUTO: 5.78 K/UL — SIGNIFICANT CHANGE UP (ref 3.8–10.5)
WBC # FLD AUTO: 6.66 K/UL — SIGNIFICANT CHANGE UP (ref 3.8–10.5)
WBC UR QL: SIGNIFICANT CHANGE UP

## 2020-08-28 PROCEDURE — 74019 RADEX ABDOMEN 2 VIEWS: CPT | Mod: 26

## 2020-08-28 PROCEDURE — 71045 X-RAY EXAM CHEST 1 VIEW: CPT | Mod: 26

## 2020-08-28 PROCEDURE — 99222 1ST HOSP IP/OBS MODERATE 55: CPT

## 2020-08-28 PROCEDURE — 99285 EMERGENCY DEPT VISIT HI MDM: CPT

## 2020-08-28 PROCEDURE — 99233 SBSQ HOSP IP/OBS HIGH 50: CPT

## 2020-08-28 PROCEDURE — 71045 X-RAY EXAM CHEST 1 VIEW: CPT | Mod: 26,77

## 2020-08-28 PROCEDURE — 74177 CT ABD & PELVIS W/CONTRAST: CPT | Mod: 26

## 2020-08-28 PROCEDURE — 93010 ELECTROCARDIOGRAM REPORT: CPT

## 2020-08-28 PROCEDURE — 74018 RADEX ABDOMEN 1 VIEW: CPT | Mod: 26,59

## 2020-08-28 RX ORDER — DEXAMETHASONE 0.5 MG/5ML
10 ELIXIR ORAL EVERY 8 HOURS
Refills: 0 | Status: DISCONTINUED | OUTPATIENT
Start: 2020-08-28 | End: 2020-09-02

## 2020-08-28 RX ORDER — HEPARIN SODIUM 5000 [USP'U]/ML
5000 INJECTION INTRAVENOUS; SUBCUTANEOUS EVERY 12 HOURS
Refills: 0 | Status: DISCONTINUED | OUTPATIENT
Start: 2020-08-28 | End: 2020-08-28

## 2020-08-28 RX ORDER — ENOXAPARIN SODIUM 100 MG/ML
40 INJECTION SUBCUTANEOUS DAILY
Refills: 0 | Status: DISCONTINUED | OUTPATIENT
Start: 2020-08-28 | End: 2020-09-03

## 2020-08-28 RX ORDER — DEXTROSE MONOHYDRATE, SODIUM CHLORIDE, AND POTASSIUM CHLORIDE 50; .745; 4.5 G/1000ML; G/1000ML; G/1000ML
1000 INJECTION, SOLUTION INTRAVENOUS
Refills: 0 | Status: DISCONTINUED | OUTPATIENT
Start: 2020-08-28 | End: 2020-08-31

## 2020-08-28 RX ORDER — MORPHINE SULFATE 50 MG/1
2 CAPSULE, EXTENDED RELEASE ORAL EVERY 4 HOURS
Refills: 0 | Status: DISCONTINUED | OUTPATIENT
Start: 2020-08-28 | End: 2020-08-31

## 2020-08-28 RX ORDER — NEBIVOLOL HYDROCHLORIDE 5 MG/1
5 TABLET ORAL DAILY
Refills: 0 | Status: DISCONTINUED | OUTPATIENT
Start: 2020-08-28 | End: 2020-09-03

## 2020-08-28 RX ORDER — LISINOPRIL 2.5 MG/1
10 TABLET ORAL DAILY
Refills: 0 | Status: DISCONTINUED | OUTPATIENT
Start: 2020-08-28 | End: 2020-09-03

## 2020-08-28 RX ORDER — SODIUM CHLORIDE 9 MG/ML
1000 INJECTION, SOLUTION INTRAVENOUS ONCE
Refills: 0 | Status: COMPLETED | OUTPATIENT
Start: 2020-08-28 | End: 2020-08-28

## 2020-08-28 RX ORDER — MORPHINE SULFATE 50 MG/1
4 CAPSULE, EXTENDED RELEASE ORAL ONCE
Refills: 0 | Status: DISCONTINUED | OUTPATIENT
Start: 2020-08-28 | End: 2020-08-28

## 2020-08-28 RX ORDER — METOCLOPRAMIDE HCL 10 MG
10 TABLET ORAL EVERY 8 HOURS
Refills: 0 | Status: DISCONTINUED | OUTPATIENT
Start: 2020-08-28 | End: 2020-09-02

## 2020-08-28 RX ORDER — METOCLOPRAMIDE HCL 10 MG
10 TABLET ORAL ONCE
Refills: 0 | Status: COMPLETED | OUTPATIENT
Start: 2020-08-28 | End: 2020-08-28

## 2020-08-28 RX ORDER — ACETAMINOPHEN 500 MG
650 TABLET ORAL EVERY 6 HOURS
Refills: 0 | Status: DISCONTINUED | OUTPATIENT
Start: 2020-08-28 | End: 2020-08-28

## 2020-08-28 RX ORDER — ONDANSETRON 8 MG/1
4 TABLET, FILM COATED ORAL EVERY 6 HOURS
Refills: 0 | Status: DISCONTINUED | OUTPATIENT
Start: 2020-08-28 | End: 2020-08-28

## 2020-08-28 RX ORDER — HYDROCHLOROTHIAZIDE 25 MG
12.5 TABLET ORAL DAILY
Refills: 0 | Status: DISCONTINUED | OUTPATIENT
Start: 2020-08-28 | End: 2020-08-28

## 2020-08-28 RX ORDER — HYDRALAZINE HCL 50 MG
5 TABLET ORAL ONCE
Refills: 0 | Status: COMPLETED | OUTPATIENT
Start: 2020-08-28 | End: 2020-08-28

## 2020-08-28 RX ORDER — DEXAMETHASONE 0.5 MG/5ML
10 ELIXIR ORAL EVERY 8 HOURS
Refills: 0 | Status: DISCONTINUED | OUTPATIENT
Start: 2020-08-28 | End: 2020-08-28

## 2020-08-28 RX ORDER — HYDRALAZINE HCL 50 MG
10 TABLET ORAL EVERY 6 HOURS
Refills: 0 | Status: DISCONTINUED | OUTPATIENT
Start: 2020-08-28 | End: 2020-09-03

## 2020-08-28 RX ORDER — PANTOPRAZOLE SODIUM 20 MG/1
40 TABLET, DELAYED RELEASE ORAL DAILY
Refills: 0 | Status: DISCONTINUED | OUTPATIENT
Start: 2020-08-28 | End: 2020-08-28

## 2020-08-28 RX ORDER — OCTREOTIDE ACETATE 200 UG/ML
25 INJECTION, SOLUTION INTRAVENOUS; SUBCUTANEOUS
Qty: 500 | Refills: 0 | Status: DISCONTINUED | OUTPATIENT
Start: 2020-08-28 | End: 2020-09-02

## 2020-08-28 RX ORDER — PANTOPRAZOLE SODIUM 20 MG/1
40 TABLET, DELAYED RELEASE ORAL DAILY
Refills: 0 | Status: DISCONTINUED | OUTPATIENT
Start: 2020-08-28 | End: 2020-09-03

## 2020-08-28 RX ORDER — FAMOTIDINE 10 MG/ML
20 INJECTION INTRAVENOUS ONCE
Refills: 0 | Status: COMPLETED | OUTPATIENT
Start: 2020-08-28 | End: 2020-08-28

## 2020-08-28 RX ORDER — SODIUM CHLORIDE 9 MG/ML
1000 INJECTION INTRAMUSCULAR; INTRAVENOUS; SUBCUTANEOUS ONCE
Refills: 0 | Status: COMPLETED | OUTPATIENT
Start: 2020-08-28 | End: 2020-08-28

## 2020-08-28 RX ORDER — IOHEXOL 300 MG/ML
30 INJECTION, SOLUTION INTRAVENOUS ONCE
Refills: 0 | Status: COMPLETED | OUTPATIENT
Start: 2020-08-28 | End: 2020-08-28

## 2020-08-28 RX ADMIN — HEPARIN SODIUM 5000 UNIT(S): 5000 INJECTION INTRAVENOUS; SUBCUTANEOUS at 06:52

## 2020-08-28 RX ADMIN — Medication 10 MILLIGRAM(S): at 02:24

## 2020-08-28 RX ADMIN — FAMOTIDINE 20 MILLIGRAM(S): 10 INJECTION INTRAVENOUS at 02:25

## 2020-08-28 RX ADMIN — DEXTROSE MONOHYDRATE, SODIUM CHLORIDE, AND POTASSIUM CHLORIDE 125 MILLILITER(S): 50; .745; 4.5 INJECTION, SOLUTION INTRAVENOUS at 21:20

## 2020-08-28 RX ADMIN — Medication 10 MILLIGRAM(S): at 21:45

## 2020-08-28 RX ADMIN — Medication 10 MILLIGRAM(S): at 17:24

## 2020-08-28 RX ADMIN — MORPHINE SULFATE 2 MILLIGRAM(S): 50 CAPSULE, EXTENDED RELEASE ORAL at 06:52

## 2020-08-28 RX ADMIN — OCTREOTIDE ACETATE 5 MICROGRAM(S)/HR: 200 INJECTION, SOLUTION INTRAVENOUS; SUBCUTANEOUS at 13:04

## 2020-08-28 RX ADMIN — MORPHINE SULFATE 2 MILLIGRAM(S): 50 CAPSULE, EXTENDED RELEASE ORAL at 07:12

## 2020-08-28 RX ADMIN — Medication 102 MILLIGRAM(S): at 23:45

## 2020-08-28 RX ADMIN — MORPHINE SULFATE 4 MILLIGRAM(S): 50 CAPSULE, EXTENDED RELEASE ORAL at 02:24

## 2020-08-28 RX ADMIN — SODIUM CHLORIDE 1000 MILLILITER(S): 9 INJECTION INTRAMUSCULAR; INTRAVENOUS; SUBCUTANEOUS at 02:25

## 2020-08-28 RX ADMIN — Medication 102 MILLIGRAM(S): at 17:24

## 2020-08-28 RX ADMIN — DEXTROSE MONOHYDRATE, SODIUM CHLORIDE, AND POTASSIUM CHLORIDE 125 MILLILITER(S): 50; .745; 4.5 INJECTION, SOLUTION INTRAVENOUS at 09:27

## 2020-08-28 RX ADMIN — SODIUM CHLORIDE 1000 MILLILITER(S): 9 INJECTION, SOLUTION INTRAVENOUS at 06:41

## 2020-08-28 RX ADMIN — Medication 5 MILLIGRAM(S): at 06:19

## 2020-08-28 RX ADMIN — ENOXAPARIN SODIUM 40 MILLIGRAM(S): 100 INJECTION SUBCUTANEOUS at 17:25

## 2020-08-28 RX ADMIN — IOHEXOL 30 MILLILITER(S): 300 INJECTION, SOLUTION INTRAVENOUS at 02:24

## 2020-08-28 NOTE — ED ADULT NURSE NOTE - CHIEF COMPLAINT QUOTE
pt c/o mid-abd pain, Nausea  x 2 days. pt stated didn't have a BM in 2 days, took anti gas meds with no relief. . h/o HTN

## 2020-08-28 NOTE — H&P ADULT - PROBLEM SELECTOR PLAN 1
-Admit patient   -NPO, NGT to LWS, IVF  -KUB to confirm NGT placement  -pain management prn  -anti-emetic prn  -GI ppx  -DVT ppx  -f/u labs  -will discuss with attending

## 2020-08-28 NOTE — H&P ADULT - NSHPPHYSICALEXAM_GEN_ALL_CORE
PHYSICAL EXAM:  GENERAL: NAD, well-developed  HEAD:  Atraumatic, Normocephalic  EYES: Conjunctiva and sclera clear  ENMT: No tonsillar erythema, exudates, or enlargement; Moist mucous membranes, No lesions  NECK: Supple, No JVD, Normal thyroid  NERVOUS SYSTEM:  Alert & Oriented X3  CHEST/LUNG: Clear to auscultation bilaterally; No rales, rhonchi, wheezing  HEART: Regular rate and rhythm. S1S2  ABDOMEN: Well-healed midline surgical scar and other abdominal scars from patient's traditional practices. Soft mild distention, hypoactive BS, soft, periumbilical tenderness, no guarding, no rigidity   EXTREMITIES:  2+ Peripheral Pulses, No clubbing, cyanosis, or edema

## 2020-08-28 NOTE — ED ADULT NURSE NOTE - NSIMPLEMENTINTERV_GEN_ALL_ED
Implemented All Universal Safety Interventions:  Justice to call system. Call bell, personal items and telephone within reach. Instruct patient to call for assistance. Room bathroom lighting operational. Non-slip footwear when patient is off stretcher. Physically safe environment: no spills, clutter or unnecessary equipment. Stretcher in lowest position, wheels locked, appropriate side rails in place.

## 2020-08-28 NOTE — ED ADULT NURSE NOTE - ED STAT RN HANDOFF DETAILS
handoff given to ian . pt general condition remains stable. NGT L nares intact. endorsed for continued care

## 2020-08-28 NOTE — ED PROVIDER NOTE - CLINICAL SUMMARY MEDICAL DECISION MAKING FREE TEXT BOX
59 yo F with abd pain, nausea, no vomiting, r/o obstruction ischemia, pancreatitis, uti, renal stone, doubt pna, acs  -basic labs, coags, trop, ckmb, ua, cx, hcg, lactate, lipase, ekg, iv, monitor, Pepcid, Reglan, morphine for symptoms, ns hydration bolus  -f/u results, reeval

## 2020-08-28 NOTE — ED PROVIDER NOTE - CARE PLAN
Principal Discharge DX:	Epigastric pain  Secondary Diagnosis:	Nausea Principal Discharge DX:	SBO (small bowel obstruction)  Secondary Diagnosis:	Nausea

## 2020-08-28 NOTE — H&P ADULT - PROBLEM SELECTOR PLAN 3
s/p bilateral mastectomy and breast reconstruction, abdominal carcinomatosis s/p chemo/radiation and hysterectomy

## 2020-08-28 NOTE — H&P ADULT - ASSESSMENT
61 y/o female with PMHx breast CA diagnosed in 2017 (s/p bilateral mastectomy and breast reconstruction), abdominal carcinomatosis (s/p chemo/radiation and hysterectomy), HTN, obesity, GERD, sleep apnea c/o periumbilical abdominal pain x 3 days. Admitted with SBO. 59 y/o female with PMHx breast CA diagnosed in 2017 (s/p bilateral mastectomy and breast reconstruction), abdominal carcinomatosis (s/p chemo/radiation and hysterectomy), HTN, GERD, sleep apnea c/o periumbilical abdominal pain x 3 days. Admitted with SBO.

## 2020-08-28 NOTE — ED ADULT TRIAGE NOTE - HEIGHT IN FEET
Called pt and relayed information below. Scheduled pt for follow up appt with Dr. Juarez. Pt unable to obtain appt until 8/8/17. Pt states she will soon be out of clonazepam. Currently has six, 0.5 mg clonazepam tablets left. Pt verbalizes understanding of addictive nature of medication. Pt states she does not want to increase dose, however is concerned that she will not have enough medication until Tuesday.    Dr. Juarez: Please advise.   5

## 2020-08-28 NOTE — ED PROVIDER NOTE - OBJECTIVE STATEMENT
61 yo F with 2 days of nausea and mid abd pain.  Pain is severe rated 8/10, non-radiating, constant.  Pt. admits last time she felt like this she found out she had CA about a year ago on CT scan--abdominal carcinomatosis.  Since then pt. states she had abdominal surgery, had chemo and radiation.  No other complaints/inciting event.  ROS: negative for fever, cough, headache, chest pain, shortness of breath, vomiting, diarrhea, rash, paresthesia, and weakness--all other systems reviewed are negative.   PMH: R breast CA (s/p bilateral mastectomy and breast reconstruction), abdominal carcinomatosis (s/p chemo/radiation and hysterectomy), HTN, obesity, GERD, sleep apnea, seasonal allergies; Meds: Denies; SH: Denies smoking/drinking/drug use 59 yo F with 2 days of nausea and mid abd pain.  Pain is severe rated 8/10, non-radiating, constant.  Pt. admits last time she felt like this she found out she had CA about a year ago on CT scan--abdominal carcinomatosis.  Since then pt. states she had abdominal surgery, had chemo and radiation.  No other complaints/inciting event.  ROS: negative for fever, cough, headache, chest pain, shortness of breath, vomiting, diarrhea, rash, paresthesia, and weakness--all other systems reviewed are negative.   PMH: R breast CA (s/p bilateral mastectomy and breast reconstruction), abdominal carcinomatosis (s/p chemo/radiation and hysterectomy), HTN, obesity, GERD, sleep apnea, seasonal allergies; Meds: See EMR for list; SH: Denies smoking/drinking/drug use

## 2020-08-28 NOTE — ED PROVIDER NOTE - PHYSICAL EXAMINATION
Vitals: HTN at 168/94, otherwise WNL  Gen: AAOx3, NAD, sitting comfortably in stretcher  Head: ncat, perrla, eomi b/l  Neck: supple, no lymphadenopathy, no midline deviation  Heart: rrr, no m/r/g  Lungs: CTA b/l, no rales/ronchi/wheezes  Abd: soft, tender centrally, non-distended, no rebound or guarding  Ext: no clubbing/cyanosis/edema  Neuro: sensation and muscle strength intact b/l Vitals: HTN at 168/94, otherwise WNL  Gen: AAOx3, NAD, sitting uncomfortably in stretcher  Head: ncat, perrla, eomi b/l  Neck: supple, no lymphadenopathy, no midline deviation  Heart: rrr, no m/r/g  Lungs: CTA b/l, no rales/ronchi/wheezes  Abd: soft, tender centrally, non-distended, no rebound or guarding  Ext: no clubbing/cyanosis/edema  Neuro: sensation and muscle strength intact b/l

## 2020-08-28 NOTE — H&P ADULT - ATTENDING COMMENTS
Ms. Mg was examined. Overall feeling better since NGT. Imaging and pathology reviewed. 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 ON exam she is mildly distended, minimal tenderness of umbilicus, soft. Will continue with NGT decompression, decadron, reglan, octreotide.

## 2020-08-28 NOTE — H&P ADULT - NSHPLABSRESULTS_GEN_ALL_CORE
12.1   5.78  )-----------( 214      ( 28 Aug 2020 02:37 )             35.4   08-28    138  |  104  |  12  ----------------------------<  116<H>  3.8   |  29  |  0.95    Ca    9.2      28 Aug 2020 02:37    TPro  7.8  /  Alb  3.3  /  TBili  0.5  /  DBili  x   /  AST  17  /  ALT  15  /  AlkPhos  59  08-28    Lactate: 0.9    < from: CT Abdomen and Pelvis w/ Oral Cont and w/ IV Cont (08.28.20 @ 04:10) >    FINDINGS:  The heart is not enlarged. The visualized lungs are clear.    There are dilated loops of small bowel which trace to a single transition point in the mid lower abdomen on series 2 image 61. Loops of small bowel distal to this point are collapsed to the terminal ileum. There is no free intraperitoneal air or abdominalabscess.  The appendix is normal. There is no pneumatosis or abnormal bowel wall thickening. Decreased overall volume of ascites, now small to moderate.    The liver, gallbladder, spleen, pancreas and adrenal glands are unremarkable.  The kidneys enhance symmetrically without hydronephrosis.    The abdominal aorta is normal in caliber. There is no retroperitoneal, pelvic or inguinal adenopathy.    The urinary bladder is unremarkable. Status post interval hysterectomy. There is no pelvic soft tissue mass.    The visualized osseous structures demonstrate no acute abnormality. Status post bilateral subglandular breast implants again seen.    IMPRESSION:  Small bowel obstruction likely due to adhesion. No free air or pneumatosis. Decrease, now small, volume ascites.    < end of copied text >

## 2020-08-28 NOTE — ED PROVIDER NOTE - PROGRESS NOTE DETAILS
SBO on CT, likely 2/2 adhesions  called surgery PA for eval, will come to ER now  plan to admit to Dr. Kelsey, pending official acceptance

## 2020-08-28 NOTE — H&P ADULT - NSICDXPASTSURGICALHX_GEN_ALL_CORE_FT
PAST SURGICAL HISTORY:  Knee injury Left & had surgery about 30 years ago    S/P bilateral mastectomy     S/P breast reconstruction, bilateral     S/P hysterectomy

## 2020-08-28 NOTE — H&P ADULT - HISTORY OF PRESENT ILLNESS
59 y/o female with PMHx breast CA diagnosed in 2017 (s/p bilateral mastectomy and breast reconstruction), abdominal carcinomatosis (s/p chemo/radiation and hysterectomy), HTN, obesity, GERD, sleep apnea c/o periumbilical abdominal pain x 3 days, non-radiating. Patient had a BM and flatus Tuesday. She reported abdominal pain and constipation Wednesday so she took an enema and had BM/flatus after. Reports nausea. Patient denies fever, chills, vomiting, diarrhea, melena, hematochezia, dysuria, hematuria, chest pain, shortness of breath, dizziness, cough. Patient denies prior incident. 61 y/o female with PMHx breast CA diagnosed in 2017 (s/p bilateral mastectomy and breast reconstruction), abdominal carcinomatosis (s/p chemo/radiation and hysterectomy), HTN, GERD, sleep apnea c/o periumbilical abdominal pain x 3 days, non-radiating. Patient had a BM and flatus Tuesday. She reported abdominal pain and constipation Wednesday so she took an enema and had BM/flatus after. Reports nausea. Patient denies fever, chills, vomiting, diarrhea, melena, hematochezia, dysuria, hematuria, chest pain, shortness of breath, dizziness, cough. Patient denies prior incident.

## 2020-08-29 LAB
ANION GAP SERPL CALC-SCNC: 7 MMOL/L — SIGNIFICANT CHANGE UP (ref 5–17)
BUN SERPL-MCNC: 7 MG/DL — SIGNIFICANT CHANGE UP (ref 7–23)
CALCIUM SERPL-MCNC: 8.8 MG/DL — SIGNIFICANT CHANGE UP (ref 8.5–10.1)
CHLORIDE SERPL-SCNC: 105 MMOL/L — SIGNIFICANT CHANGE UP (ref 96–108)
CO2 SERPL-SCNC: 27 MMOL/L — SIGNIFICANT CHANGE UP (ref 22–31)
CREAT SERPL-MCNC: 0.73 MG/DL — SIGNIFICANT CHANGE UP (ref 0.5–1.3)
CULTURE RESULTS: SIGNIFICANT CHANGE UP
GLUCOSE SERPL-MCNC: 149 MG/DL — HIGH (ref 70–99)
HCT VFR BLD CALC: 34.3 % — LOW (ref 34.5–45)
HGB BLD-MCNC: 11.5 G/DL — SIGNIFICANT CHANGE UP (ref 11.5–15.5)
MAGNESIUM SERPL-MCNC: 2.4 MG/DL — SIGNIFICANT CHANGE UP (ref 1.6–2.6)
MCHC RBC-ENTMCNC: 33.3 PG — SIGNIFICANT CHANGE UP (ref 27–34)
MCHC RBC-ENTMCNC: 33.5 GM/DL — SIGNIFICANT CHANGE UP (ref 32–36)
MCV RBC AUTO: 99.4 FL — SIGNIFICANT CHANGE UP (ref 80–100)
NRBC # BLD: 0 /100 WBCS — SIGNIFICANT CHANGE UP (ref 0–0)
PHOSPHATE SERPL-MCNC: 3.5 MG/DL — SIGNIFICANT CHANGE UP (ref 2.5–4.5)
PLATELET # BLD AUTO: 213 K/UL — SIGNIFICANT CHANGE UP (ref 150–400)
POTASSIUM SERPL-MCNC: 4.1 MMOL/L — SIGNIFICANT CHANGE UP (ref 3.5–5.3)
POTASSIUM SERPL-SCNC: 4.1 MMOL/L — SIGNIFICANT CHANGE UP (ref 3.5–5.3)
RBC # BLD: 3.45 M/UL — LOW (ref 3.8–5.2)
RBC # FLD: 13.9 % — SIGNIFICANT CHANGE UP (ref 10.3–14.5)
SODIUM SERPL-SCNC: 139 MMOL/L — SIGNIFICANT CHANGE UP (ref 135–145)
SPECIMEN SOURCE: SIGNIFICANT CHANGE UP
WBC # BLD: 4.54 K/UL — SIGNIFICANT CHANGE UP (ref 3.8–10.5)
WBC # FLD AUTO: 4.54 K/UL — SIGNIFICANT CHANGE UP (ref 3.8–10.5)

## 2020-08-29 PROCEDURE — 99232 SBSQ HOSP IP/OBS MODERATE 35: CPT

## 2020-08-29 PROCEDURE — 74019 RADEX ABDOMEN 2 VIEWS: CPT | Mod: 26

## 2020-08-29 PROCEDURE — 99233 SBSQ HOSP IP/OBS HIGH 50: CPT

## 2020-08-29 RX ORDER — ISOSORBIDE MONONITRATE 60 MG/1
30 TABLET, EXTENDED RELEASE ORAL DAILY
Refills: 0 | Status: DISCONTINUED | OUTPATIENT
Start: 2020-08-29 | End: 2020-09-03

## 2020-08-29 RX ADMIN — NEBIVOLOL HYDROCHLORIDE 5 MILLIGRAM(S): 5 TABLET ORAL at 06:40

## 2020-08-29 RX ADMIN — LISINOPRIL 10 MILLIGRAM(S): 2.5 TABLET ORAL at 06:40

## 2020-08-29 RX ADMIN — Medication 10 MILLIGRAM(S): at 13:55

## 2020-08-29 RX ADMIN — ISOSORBIDE MONONITRATE 30 MILLIGRAM(S): 60 TABLET, EXTENDED RELEASE ORAL at 13:55

## 2020-08-29 RX ADMIN — PANTOPRAZOLE SODIUM 40 MILLIGRAM(S): 20 TABLET, DELAYED RELEASE ORAL at 11:55

## 2020-08-29 RX ADMIN — ENOXAPARIN SODIUM 40 MILLIGRAM(S): 100 INJECTION SUBCUTANEOUS at 17:14

## 2020-08-29 RX ADMIN — DEXTROSE MONOHYDRATE, SODIUM CHLORIDE, AND POTASSIUM CHLORIDE 125 MILLILITER(S): 50; .745; 4.5 INJECTION, SOLUTION INTRAVENOUS at 06:24

## 2020-08-29 RX ADMIN — Medication 102 MILLIGRAM(S): at 21:25

## 2020-08-29 RX ADMIN — Medication 102 MILLIGRAM(S): at 06:19

## 2020-08-29 RX ADMIN — Medication 10 MILLIGRAM(S): at 21:25

## 2020-08-29 RX ADMIN — Medication 102 MILLIGRAM(S): at 13:55

## 2020-08-29 RX ADMIN — Medication 10 MILLIGRAM(S): at 06:40

## 2020-08-29 RX ADMIN — OCTREOTIDE ACETATE 5 MICROGRAM(S)/HR: 200 INJECTION, SOLUTION INTRAVENOUS; SUBCUTANEOUS at 11:55

## 2020-08-30 LAB
ANION GAP SERPL CALC-SCNC: 6 MMOL/L — SIGNIFICANT CHANGE UP (ref 5–17)
BUN SERPL-MCNC: 12 MG/DL — SIGNIFICANT CHANGE UP (ref 7–23)
CALCIUM SERPL-MCNC: 8.9 MG/DL — SIGNIFICANT CHANGE UP (ref 8.5–10.1)
CHLORIDE SERPL-SCNC: 106 MMOL/L — SIGNIFICANT CHANGE UP (ref 96–108)
CO2 SERPL-SCNC: 28 MMOL/L — SIGNIFICANT CHANGE UP (ref 22–31)
CREAT SERPL-MCNC: 0.84 MG/DL — SIGNIFICANT CHANGE UP (ref 0.5–1.3)
FOLATE SERPL-MCNC: 9.7 NG/ML — SIGNIFICANT CHANGE UP
GLUCOSE SERPL-MCNC: 110 MG/DL — HIGH (ref 70–99)
HCT VFR BLD CALC: 33.1 % — LOW (ref 34.5–45)
HGB BLD-MCNC: 10.6 G/DL — LOW (ref 11.5–15.5)
MAGNESIUM SERPL-MCNC: 2.4 MG/DL — SIGNIFICANT CHANGE UP (ref 1.6–2.6)
MCHC RBC-ENTMCNC: 32 GM/DL — SIGNIFICANT CHANGE UP (ref 32–36)
MCHC RBC-ENTMCNC: 32.7 PG — SIGNIFICANT CHANGE UP (ref 27–34)
MCV RBC AUTO: 102.2 FL — HIGH (ref 80–100)
NRBC # BLD: 0 /100 WBCS — SIGNIFICANT CHANGE UP (ref 0–0)
PHOSPHATE SERPL-MCNC: 3.2 MG/DL — SIGNIFICANT CHANGE UP (ref 2.5–4.5)
PLATELET # BLD AUTO: 209 K/UL — SIGNIFICANT CHANGE UP (ref 150–400)
POTASSIUM SERPL-MCNC: 4 MMOL/L — SIGNIFICANT CHANGE UP (ref 3.5–5.3)
POTASSIUM SERPL-SCNC: 4 MMOL/L — SIGNIFICANT CHANGE UP (ref 3.5–5.3)
RBC # BLD: 3.24 M/UL — LOW (ref 3.8–5.2)
RBC # FLD: 13.8 % — SIGNIFICANT CHANGE UP (ref 10.3–14.5)
SODIUM SERPL-SCNC: 140 MMOL/L — SIGNIFICANT CHANGE UP (ref 135–145)
VIT B12 SERPL-MCNC: 1171 PG/ML — SIGNIFICANT CHANGE UP (ref 232–1245)
WBC # BLD: 4.91 K/UL — SIGNIFICANT CHANGE UP (ref 3.8–10.5)
WBC # FLD AUTO: 4.91 K/UL — SIGNIFICANT CHANGE UP (ref 3.8–10.5)

## 2020-08-30 PROCEDURE — 99233 SBSQ HOSP IP/OBS HIGH 50: CPT

## 2020-08-30 PROCEDURE — 99232 SBSQ HOSP IP/OBS MODERATE 35: CPT

## 2020-08-30 RX ADMIN — ENOXAPARIN SODIUM 40 MILLIGRAM(S): 100 INJECTION SUBCUTANEOUS at 17:28

## 2020-08-30 RX ADMIN — Medication 102 MILLIGRAM(S): at 13:24

## 2020-08-30 RX ADMIN — LISINOPRIL 10 MILLIGRAM(S): 2.5 TABLET ORAL at 05:59

## 2020-08-30 RX ADMIN — ISOSORBIDE MONONITRATE 30 MILLIGRAM(S): 60 TABLET, EXTENDED RELEASE ORAL at 12:14

## 2020-08-30 RX ADMIN — DEXTROSE MONOHYDRATE, SODIUM CHLORIDE, AND POTASSIUM CHLORIDE 125 MILLILITER(S): 50; .745; 4.5 INJECTION, SOLUTION INTRAVENOUS at 05:59

## 2020-08-30 RX ADMIN — PANTOPRAZOLE SODIUM 40 MILLIGRAM(S): 20 TABLET, DELAYED RELEASE ORAL at 12:14

## 2020-08-30 RX ADMIN — Medication 10 MILLIGRAM(S): at 22:11

## 2020-08-30 RX ADMIN — Medication 10 MILLIGRAM(S): at 13:24

## 2020-08-30 RX ADMIN — Medication 10 MILLIGRAM(S): at 05:59

## 2020-08-30 RX ADMIN — Medication 102 MILLIGRAM(S): at 22:10

## 2020-08-30 RX ADMIN — Medication 102 MILLIGRAM(S): at 06:00

## 2020-08-30 RX ADMIN — NEBIVOLOL HYDROCHLORIDE 5 MILLIGRAM(S): 5 TABLET ORAL at 05:59

## 2020-08-30 RX ADMIN — OCTREOTIDE ACETATE 5 MICROGRAM(S)/HR: 200 INJECTION, SOLUTION INTRAVENOUS; SUBCUTANEOUS at 06:29

## 2020-08-30 RX ADMIN — DEXTROSE MONOHYDRATE, SODIUM CHLORIDE, AND POTASSIUM CHLORIDE 125 MILLILITER(S): 50; .745; 4.5 INJECTION, SOLUTION INTRAVENOUS at 22:48

## 2020-08-30 NOTE — PROGRESS NOTE ADULT - ATTENDING COMMENTS
Patient feeling better.  Ambulating, passing flatus.  Clamp trial, if passes advance to clears this afternoon.

## 2020-08-30 NOTE — CONSULT NOTE ADULT - PROBLEM SELECTOR RECOMMENDATION 4
DVtp: Lovenox
DVtp: Lovenox
Low Hgb:   -most likely dilutional  -no signs of bleeding, monitor h/h prn     DVtp: Lovenox

## 2020-08-30 NOTE — CONSULT NOTE ADULT - PROBLEM SELECTOR RECOMMENDATION 2
will hold HCTZ to avoid adverse outcomes   cont Bystolic and Lisinopril   will monitor and adjust as needed.
hold HCTZ to avoid adverse outcomes   cont Bystolic, isosorbide and Lisinopril   will monitor and adjust as needed.
hold HCTZ to avoid adverse outcomes   will add isosorbide for now   cont Bystolic and Lisinopril   will monitor and adjust as needed.

## 2020-08-31 ENCOUNTER — TRANSCRIPTION ENCOUNTER (OUTPATIENT)
Age: 60
End: 2020-08-31

## 2020-08-31 DIAGNOSIS — C55 MALIGNANT NEOPLASM OF UTERUS, PART UNSPECIFIED: ICD-10-CM

## 2020-08-31 DIAGNOSIS — C50.919 MALIGNANT NEOPLASM OF UNSPECIFIED SITE OF UNSPECIFIED FEMALE BREAST: ICD-10-CM

## 2020-08-31 LAB
ANION GAP SERPL CALC-SCNC: 7 MMOL/L — SIGNIFICANT CHANGE UP (ref 5–17)
BUN SERPL-MCNC: 13 MG/DL — SIGNIFICANT CHANGE UP (ref 7–23)
CALCIUM SERPL-MCNC: 9 MG/DL — SIGNIFICANT CHANGE UP (ref 8.5–10.1)
CHLORIDE SERPL-SCNC: 107 MMOL/L — SIGNIFICANT CHANGE UP (ref 96–108)
CO2 SERPL-SCNC: 27 MMOL/L — SIGNIFICANT CHANGE UP (ref 22–31)
CREAT SERPL-MCNC: 0.73 MG/DL — SIGNIFICANT CHANGE UP (ref 0.5–1.3)
GLUCOSE SERPL-MCNC: 126 MG/DL — HIGH (ref 70–99)
HCT VFR BLD CALC: 33.5 % — LOW (ref 34.5–45)
HGB BLD-MCNC: 11.2 G/DL — LOW (ref 11.5–15.5)
MAGNESIUM SERPL-MCNC: 2.3 MG/DL — SIGNIFICANT CHANGE UP (ref 1.6–2.6)
MCHC RBC-ENTMCNC: 33.3 PG — SIGNIFICANT CHANGE UP (ref 27–34)
MCHC RBC-ENTMCNC: 33.4 GM/DL — SIGNIFICANT CHANGE UP (ref 32–36)
MCV RBC AUTO: 99.7 FL — SIGNIFICANT CHANGE UP (ref 80–100)
NRBC # BLD: 0 /100 WBCS — SIGNIFICANT CHANGE UP (ref 0–0)
PHOSPHATE SERPL-MCNC: 2.7 MG/DL — SIGNIFICANT CHANGE UP (ref 2.5–4.5)
PLATELET # BLD AUTO: 212 K/UL — SIGNIFICANT CHANGE UP (ref 150–400)
POTASSIUM SERPL-MCNC: 3.9 MMOL/L — SIGNIFICANT CHANGE UP (ref 3.5–5.3)
POTASSIUM SERPL-SCNC: 3.9 MMOL/L — SIGNIFICANT CHANGE UP (ref 3.5–5.3)
RBC # BLD: 3.36 M/UL — LOW (ref 3.8–5.2)
RBC # FLD: 13.7 % — SIGNIFICANT CHANGE UP (ref 10.3–14.5)
SODIUM SERPL-SCNC: 141 MMOL/L — SIGNIFICANT CHANGE UP (ref 135–145)
WBC # BLD: 4.74 K/UL — SIGNIFICANT CHANGE UP (ref 3.8–10.5)
WBC # FLD AUTO: 4.74 K/UL — SIGNIFICANT CHANGE UP (ref 3.8–10.5)

## 2020-08-31 PROCEDURE — 99232 SBSQ HOSP IP/OBS MODERATE 35: CPT

## 2020-08-31 PROCEDURE — 99233 SBSQ HOSP IP/OBS HIGH 50: CPT

## 2020-08-31 RX ORDER — SIMETHICONE 80 MG/1
80 TABLET, CHEWABLE ORAL ONCE
Refills: 0 | Status: COMPLETED | OUTPATIENT
Start: 2020-08-31 | End: 2020-08-31

## 2020-08-31 RX ADMIN — SIMETHICONE 80 MILLIGRAM(S): 80 TABLET, CHEWABLE ORAL at 20:40

## 2020-08-31 RX ADMIN — LISINOPRIL 10 MILLIGRAM(S): 2.5 TABLET ORAL at 08:38

## 2020-08-31 RX ADMIN — Medication 10 MILLIGRAM(S): at 22:36

## 2020-08-31 RX ADMIN — ISOSORBIDE MONONITRATE 30 MILLIGRAM(S): 60 TABLET, EXTENDED RELEASE ORAL at 12:51

## 2020-08-31 RX ADMIN — Medication 102 MILLIGRAM(S): at 22:36

## 2020-08-31 RX ADMIN — ENOXAPARIN SODIUM 40 MILLIGRAM(S): 100 INJECTION SUBCUTANEOUS at 17:39

## 2020-08-31 RX ADMIN — DEXTROSE MONOHYDRATE, SODIUM CHLORIDE, AND POTASSIUM CHLORIDE 70 MILLILITER(S): 50; .745; 4.5 INJECTION, SOLUTION INTRAVENOUS at 08:36

## 2020-08-31 RX ADMIN — DEXTROSE MONOHYDRATE, SODIUM CHLORIDE, AND POTASSIUM CHLORIDE 30 MILLILITER(S): 50; .745; 4.5 INJECTION, SOLUTION INTRAVENOUS at 11:00

## 2020-08-31 RX ADMIN — Medication 10 MILLIGRAM(S): at 08:38

## 2020-08-31 RX ADMIN — Medication 102 MILLIGRAM(S): at 05:17

## 2020-08-31 RX ADMIN — Medication 102 MILLIGRAM(S): at 13:00

## 2020-08-31 RX ADMIN — PANTOPRAZOLE SODIUM 40 MILLIGRAM(S): 20 TABLET, DELAYED RELEASE ORAL at 12:52

## 2020-08-31 RX ADMIN — Medication 10 MILLIGRAM(S): at 13:00

## 2020-08-31 RX ADMIN — OCTREOTIDE ACETATE 5 MICROGRAM(S)/HR: 200 INJECTION, SOLUTION INTRAVENOUS; SUBCUTANEOUS at 05:22

## 2020-08-31 RX ADMIN — NEBIVOLOL HYDROCHLORIDE 5 MILLIGRAM(S): 5 TABLET ORAL at 08:37

## 2020-08-31 NOTE — DISCHARGE NOTE PROVIDER - CARE PROVIDER_API CALL
Yg Kelsey (MD)  Surgery  Critical Care  [address] [place] [address]  Phone: [phone]  Fax: [phone]  Follow Up Time:

## 2020-08-31 NOTE — CONSULT NOTE ADULT - SUBJECTIVE AND OBJECTIVE BOX
Patient is a 60y old  Female who presents with a chief complaint of Abdominal pain (28 Aug 2020 16:22)      INTERVAL HPI/ OVERNIGHT EVENTS: Pt was seen and examined at bedside today, SBO diagnosed, s/p NGT placement with wall suction, pt admits that abdominal distention, abd pain and nausea has improved after NGT placed, pt admits to passing gas but has not moved her bowels yet.     MEDICATIONS  (STANDING):  dexAMETHasone  IVPB 10 milliGRAM(s) IV Intermittent every 8 hours  dextrose 5% + sodium chloride 0.45% with potassium chloride 20 mEq/L 1000 milliLiter(s) (125 mL/Hr) IV Continuous <Continuous>  enoxaparin Injectable 40 milliGRAM(s) SubCutaneous daily  hydrochlorothiazide 12.5 milliGRAM(s) Oral daily  lisinopril 10 milliGRAM(s) Oral daily  metoclopramide Injectable 10 milliGRAM(s) IV Push every 8 hours  nebivolol 5 milliGRAM(s) Oral daily  octreotide  Infusion 25 MICROgram(s)/Hr (5 mL/Hr) IV Continuous <Continuous>    MEDICATIONS  (PRN):  morphine  - Injectable 2 milliGRAM(s) IV Push every 4 hours PRN Severe Pain (7 - 10)      Allergies    No Known Allergies    Intolerances        REVIEW OF SYSTEMS:    Unable to examine due to [ ] Encephalopathy [ ] Advanced Dementia [ ] Expressive Aphasia [ ] Non-verbal patient    CONSTITUTIONAL: No fever, NO generalized weakness/Fatigue, No weight loss  EYES: No eye pain, visual disturbances, or discharge  ENMT:  No difficulty hearing, tinnitus, vertigo; No sinus or throat pain  NECK: No pain or stiffness  RESPIRATORY: No shortness of breath,  cough, wheezing, sputum or hemoptysis   CARDIOVASCULAR: No chest pain, palpitations, or leg swelling  GASTROINTESTINAL: Positive abdominal pain, nausea, vomiting, and constipation. No melena or hematochezia.  GENITOURINARY: No dysuria, frequency, hematuria, or incontinence  NEUROLOGICAL: No headaches, Dizziness, memory loss, loss of strength, numbness, or tremors  SKIN: No itching, burning, rashes, or lesions   MUSCULOSKELETAL: No joint pain or swelling; No muscle, back, or extremity pain  PSYCHIATRIC: No depression, anxiety, mood swings, or difficulty sleeping  HEME/LYMPH: No easy bruising, or bleeding gums      Vital Signs Last 24 Hrs  T(C): 36.7 (28 Aug 2020 17:24), Max: 37.2 (28 Aug 2020 08:43)  T(F): 98 (28 Aug 2020 17:24), Max: 99 (28 Aug 2020 08:43)  HR: 78 (28 Aug 2020 17:24) (72 - 98)  BP: 145/78 (28 Aug 2020 17:24) (145/78 - 175/82)  BP(mean): --  RR: 18 (28 Aug 2020 17:24) (18 - 20)  SpO2: 98% (28 Aug 2020 17:24) (98% - 99%)    PHYSICAL EXAM:  GENERAL: NAD, well-developed, well-groomed  HEAD:  Atraumatic, Normocephalic  EYES: conjunctiva and sclera clear  ENMT: Moist mucous membranes  NECK: Supple, No JVD, Normal thyroid  CHEST/LUNG: Clear to Auscultation bilaterally; No rales, rhonchi, wheezing, or rubs  HEART: Regular rate and rhythm; No murmurs, rubs, or gallops  ABDOMEN: Soft, distended, mild diffuse tenderness, Bowel sounds distant   EXTREMITIES:  2+ Peripheral Pulses, No clubbing, cyanosis, or edema  SKIN: No rashes or lesions  NERVOUS SYSTEM:  Alert & Oriented X3, Good concentration; Motor Strength 5/5 B/L upper and lower extremities    LABS:                        11.8   6.66  )-----------( 202      ( 28 Aug 2020 14:51 )             34.7     08-28    140  |  104  |  8   ----------------------------<  110<H>  3.9   |  27  |  0.79    Ca    9.1      28 Aug 2020 14:51  Phos  2.8     28  Mg     2.0     28    TPro  7.2  /  Alb  3.4  /  TBili  0.5  /  DBili  x   /  AST  17  /  ALT  14  /  AlkPhos  56  08-28    PT/INR - ( 28 Aug 2020 02:37 )   PT: 13.6 sec;   INR: 1.18 ratio         PTT - ( 28 Aug 2020 02:37 )  PTT:29.1 sec  Urinalysis Basic - ( 28 Aug 2020 05:52 )    Color: Yellow / Appearance: Clear / S.015 / pH: x  Gluc: x / Ketone: Negative  / Bili: Negative / Urobili: Negative mg/dL   Blood: x / Protein: 15 mg/dL / Nitrite: Negative   Leuk Esterase: Small / RBC: 0-2 /HPF / WBC 0-2   Sq Epi: x / Non Sq Epi: Occasional / Bacteria: Occasional      CAPILLARY BLOOD GLUCOSE              RADIOLOGY & ADDITIONAL TESTS:          Imaging Personally Reviewed:  [ ] YES  [ ] NO    Consultant(s) Notes Reviewed:  [ ] YES  [ ] NO    Care Discussed with Consultants/Other Providers [x ] YES  [ ] NO
Reason for Consultation: Uterine cancer    HPI: Patient is a 60y Female seen on consultatioin for the evaluation and management of Uterine cancer    59 y/o female with PMHx breast CA diagnosed in 2017 (s/p bilateral mastectomy and breast reconstruction), abdominal carcinomatosis (s/p chemo/radiation and hysterectomy), HTN, GERD, sleep apnea c/o periumbilical abdominal pain x 3 days, non-radiating. Patient had a BM and flatus Tuesday. She reported abdominal pain and constipation Wednesday so she took an enema and had BM/flatus after. Reports nausea. Patient denies fever, chills, vomiting, diarrhea, melena, hematochezia, dysuria, hematuria, chest pain, shortness of breath, dizziness, cough. Patient denies prior incident.       PAST MEDICAL & SURGICAL HISTORY:  Essential hypertension  Cancer of breast: Rt. Female  Sleep apnea: Use Oral Device  Obesity (BMI 30.0-34.9)  Seasonal allergies  Acid reflux  S/P hysterectomy  S/P breast reconstruction, bilateral  S/P bilateral mastectomy  Knee injury: Left &amp; had surgery about 30 years ago      MEDICATIONS  (STANDING):  dexAMETHasone  IVPB 10 milliGRAM(s) IV Intermittent every 8 hours  dextrose 5% + sodium chloride 0.45% with potassium chloride 20 mEq/L 1000 milliLiter(s) (30 mL/Hr) IV Continuous <Continuous>  enoxaparin Injectable 40 milliGRAM(s) SubCutaneous daily  isosorbide   mononitrate ER Tablet (IMDUR) 30 milliGRAM(s) Oral daily  lisinopril 10 milliGRAM(s) Oral daily  metoclopramide Injectable 10 milliGRAM(s) IV Push every 8 hours  nebivolol 5 milliGRAM(s) Oral daily  octreotide  Infusion 25 MICROgram(s)/Hr (5 mL/Hr) IV Continuous <Continuous>  pantoprazole  Injectable 40 milliGRAM(s) IV Push daily    MEDICATIONS  (PRN):  hydrALAZINE Injectable 10 milliGRAM(s) IV Push every 6 hours PRN SBP > 160      Allergies    No Known Allergies    Intolerances        SOCIAL HISTORY:    Smoking Status: no  Alcohol: no  Marital Status: yes  Occupation: yes    FAMILY HISTORY:        Vital Signs Last 24 Hrs  T(C): 36.8 (31 Aug 2020 05:11), Max: 36.8 (30 Aug 2020 23:20)  T(F): 98.2 (31 Aug 2020 05:11), Max: 98.2 (30 Aug 2020 23:20)  HR: 58 (31 Aug 2020 05:11) (58 - 64)  BP: 140/71 (31 Aug 2020 05:11) (140/71 - 162/92)  BP(mean): --  RR: 17 (31 Aug 2020 05:11) (17 - 18)  SpO2: 100% (31 Aug 2020 05:11) (97% - 100%)    PHYSICAL EXAM:    general - AAO x 3  HEENT - No Icterus  CVS - RRR  RS - AE B/L  Abd - soft, NT  Ext - Pulses +        LABS:                        11.2   4.74  )-----------( 212      ( 31 Aug 2020 08:08 )             33.5     08-31    141  |  107  |  13  ----------------------------<  126<H>  3.9   |  27  |  0.73    Ca    9.0      31 Aug 2020 08:08  Phos  2.7     08-31  Mg     2.3     08-31            Culture - Urine (collected 28 Aug 2020 11:06)  Source: .Urine Clean Catch (Midstream)  Final Report (29 Aug 2020 09:12):    <10,000 CFU/mL Normal Urogenital Nelly        RADIOLOGY & ADDITIONAL STUDIES:
Patient is a 60y old  Female who presents with a chief complaint of Abdominal pain (29 Aug 2020 06:35)      INTERVAL HPI/ OVERNIGHT EVENTS: Pt was seen and examined at bedside today, No significant overnight events, pt admits that abdominal pain and distention have improved overnight, she is passing gas but still no BM; she denies any other complaints.      MEDICATIONS  (STANDING):  dexAMETHasone  IVPB 10 milliGRAM(s) IV Intermittent every 8 hours  dextrose 5% + sodium chloride 0.45% with potassium chloride 20 mEq/L 1000 milliLiter(s) (125 mL/Hr) IV Continuous <Continuous>  enoxaparin Injectable 40 milliGRAM(s) SubCutaneous daily  isosorbide   mononitrate ER Tablet (IMDUR) 30 milliGRAM(s) Oral daily  lisinopril 10 milliGRAM(s) Oral daily  metoclopramide Injectable 10 milliGRAM(s) IV Push every 8 hours  nebivolol 5 milliGRAM(s) Oral daily  octreotide  Infusion 25 MICROgram(s)/Hr (5 mL/Hr) IV Continuous <Continuous>  pantoprazole  Injectable 40 milliGRAM(s) IV Push daily    MEDICATIONS  (PRN):  hydrALAZINE Injectable 10 milliGRAM(s) IV Push every 6 hours PRN SBP > 160  morphine  - Injectable 2 milliGRAM(s) IV Push every 4 hours PRN Severe Pain (7 - 10)      Allergies    No Known Allergies    Intolerances        REVIEW OF SYSTEMS:    Unable to examine due to [ ] Encephalopathy [ ] Advanced Dementia [ ] Expressive Aphasia [ ] Non-verbal patient    CONSTITUTIONAL: No fever, NO generalized weakness/Fatigue, No weight loss  EYES: No eye pain, visual disturbances, or discharge  ENMT:  No difficulty hearing, tinnitus, vertigo; No sinus or throat pain  NECK: No pain or stiffness  RESPIRATORY: No shortness of breath,  cough, wheezing, sputum or hemoptysis   CARDIOVASCULAR: No chest pain, palpitations, or leg swelling  GASTROINTESTINAL: positive abdominal pain and distention. No nausea, vomiting, diarrhea or constipation. No melena or hematochezia.  GENITOURINARY: No dysuria, frequency, hematuria, or incontinence  NEUROLOGICAL: No headaches, Dizziness, memory loss, loss of strength, numbness, or tremors  SKIN: No itching, burning, rashes, or lesions   MUSCULOSKELETAL: No joint pain or swelling; No muscle, back, or extremity pain  PSYCHIATRIC: No depression, anxiety, mood swings, or difficulty sleeping  HEME/LYMPH: No easy bruising, or bleeding gums      Vital Signs Last 24 Hrs  T(C): 36.8 (29 Aug 2020 06:03), Max: 36.9 (28 Aug 2020 23:32)  T(F): 98.3 (29 Aug 2020 06:03), Max: 98.4 (28 Aug 2020 23:32)  HR: 72 (29 Aug 2020 06:03) (72 - 85)  BP: 149/90 (29 Aug 2020 06:03) (139/93 - 149/90)  BP(mean): --  RR: 18 (29 Aug 2020 06:03) (18 - 18)  SpO2: 99% (29 Aug 2020 06:03) (98% - 99%)    PHYSICAL EXAM:  GENERAL: NAD, well-developed, well-groomed  HEAD:  Atraumatic, Normocephalic  EYES: conjunctiva and sclera clear  ENMT: Moist mucous membranes  NECK: Supple, No JVD, Normal thyroid  CHEST/LUNG: Clear to Auscultation bilaterally; No rales, rhonchi, wheezing, or rubs  HEART: Regular rate and rhythm; No murmurs, rubs, or gallops  ABDOMEN: Soft, mild tenderness and distention; Bowel sounds present  EXTREMITIES:  2+ Peripheral Pulses, No clubbing, cyanosis, or edema  SKIN: No rashes or lesions  NERVOUS SYSTEM:  Alert & Oriented X3, Good concentration; Motor Strength 5/5 B/L upper and lower extremities    LABS:                        11.5   4.54  )-----------( 213      ( 29 Aug 2020 06:49 )             34.3     08-29    139  |  105  |  7   ----------------------------<  149<H>  4.1   |  27  |  0.73    Ca    8.8      29 Aug 2020 06:49  Phos  3.5     08-  Mg     2.4     08-    TPro  7.2  /  Alb  3.4  /  TBili  0.5  /  DBili  x   /  AST  17  /  ALT  14  /  AlkPhos  56  08-28    PT/INR - ( 28 Aug 2020 02:37 )   PT: 13.6 sec;   INR: 1.18 ratio         PTT - ( 28 Aug 2020 02:37 )  PTT:29.1 sec  Urinalysis Basic - ( 28 Aug 2020 05:52 )    Color: Yellow / Appearance: Clear / S.015 / pH: x  Gluc: x / Ketone: Negative  / Bili: Negative / Urobili: Negative mg/dL   Blood: x / Protein: 15 mg/dL / Nitrite: Negative   Leuk Esterase: Small / RBC: 0-2 /HPF / WBC 0-2   Sq Epi: x / Non Sq Epi: Occasional / Bacteria: Occasional      CAPILLARY BLOOD GLUCOSE            Culture - Urine (collected 20)  Source: .Urine Clean Catch (Midstream)  Final Report (20):    <10,000 CFU/mL Normal Urogenital Nelly        RADIOLOGY & ADDITIONAL TESTS:          Imaging Personally Reviewed:  [ ] YES  [ ] NO    Consultant(s) Notes Reviewed:  [x ] YES  [ ] NO    Care Discussed with Consultants/Other Providers [x ] YES  [ ] NO
Patient is a 60y old  Female who presents with a chief complaint of Abdominal pain (30 Aug 2020 12:10)      INTERVAL HPI/ OVERNIGHT EVENTS: Pt was seen and examined at bedside today, No significant overnight events, abdominal distention and pain cont to improve, passing gas, no BM      MEDICATIONS  (STANDING):  dexAMETHasone  IVPB 10 milliGRAM(s) IV Intermittent every 8 hours  dextrose 5% + sodium chloride 0.45% with potassium chloride 20 mEq/L 1000 milliLiter(s) (125 mL/Hr) IV Continuous <Continuous>  enoxaparin Injectable 40 milliGRAM(s) SubCutaneous daily  isosorbide   mononitrate ER Tablet (IMDUR) 30 milliGRAM(s) Oral daily  lisinopril 10 milliGRAM(s) Oral daily  metoclopramide Injectable 10 milliGRAM(s) IV Push every 8 hours  nebivolol 5 milliGRAM(s) Oral daily  octreotide  Infusion 25 MICROgram(s)/Hr (5 mL/Hr) IV Continuous <Continuous>  pantoprazole  Injectable 40 milliGRAM(s) IV Push daily    MEDICATIONS  (PRN):  hydrALAZINE Injectable 10 milliGRAM(s) IV Push every 6 hours PRN SBP > 160  morphine  - Injectable 2 milliGRAM(s) IV Push every 4 hours PRN Severe Pain (7 - 10)      Allergies    No Known Allergies    Intolerances        REVIEW OF SYSTEMS:    Unable to examine due to [ ] Encephalopathy [ ] Advanced Dementia [ ] Expressive Aphasia [ ] Non-verbal patient    CONSTITUTIONAL: No fever, NO generalized weakness/Fatigue, No weight loss  EYES: No eye pain, visual disturbances, or discharge  ENMT:  No difficulty hearing, tinnitus, vertigo; No sinus or throat pain  NECK: No pain or stiffness  RESPIRATORY: No shortness of breath,  cough, wheezing, sputum or hemoptysis   CARDIOVASCULAR: No chest pain, palpitations, or leg swelling  GASTROINTESTINAL: positive abdominal pain and distention. No nausea, vomiting, diarrhea, No melena or hematochezia.  GENITOURINARY: No dysuria, frequency, hematuria, or incontinence  NEUROLOGICAL: No headaches, Dizziness, memory loss, loss of strength, numbness, or tremors  SKIN: No itching, burning, rashes, or lesions   MUSCULOSKELETAL: No joint pain or swelling; No muscle, back, or extremity pain  PSYCHIATRIC: No depression, anxiety, mood swings, or difficulty sleeping  HEME/LYMPH: No easy bruising, or bleeding gums      Vital Signs Last 24 Hrs  T(C): 36.7 (30 Aug 2020 12:35), Max: 36.9 (30 Aug 2020 05:26)  T(F): 98 (30 Aug 2020 12:35), Max: 98.4 (30 Aug 2020 05:26)  HR: 64 (30 Aug 2020 12:35) (64 - 66)  BP: 157/91 (30 Aug 2020 12:35) (124/72 - 157/91)  BP(mean): --  RR: 18 (30 Aug 2020 12:35) (17 - 18)  SpO2: 97% (30 Aug 2020 12:35) (97% - 98%)    PHYSICAL EXAM:  GENERAL: NAD, well-developed, well-groomed  HEAD:  Atraumatic, Normocephalic  EYES: conjunctiva and sclera clear  ENMT: Moist mucous membranes  NECK: Supple, No JVD, Normal thyroid  CHEST/LUNG: Clear to Auscultation bilaterally; No rales, rhonchi, wheezing, or rubs  HEART: Regular rate and rhythm; No murmurs, rubs, or gallops  ABDOMEN: Soft, mildly distended, Nontender,  Bowel sounds present  EXTREMITIES:  2+ Peripheral Pulses, No clubbing, cyanosis, or edema  SKIN: No rashes or lesions  NERVOUS SYSTEM:  Alert & Oriented X3, Good concentration; Motor Strength 5/5 B/L upper and lower extremities    LABS:                        10.6   4.91  )-----------( 209      ( 30 Aug 2020 06:56 )             33.1     08-30    140  |  106  |  12  ----------------------------<  110<H>  4.0   |  28  |  0.84    Ca    8.9      30 Aug 2020 06:56  Phos  3.2     08-30  Mg     2.4     08-30    TPro  7.2  /  Alb  3.4  /  TBili  0.5  /  DBili  x   /  AST  17  /  ALT  14  /  AlkPhos  56  08-28        CAPILLARY BLOOD GLUCOSE            Culture - Urine (collected 08-28-20)  Source: .Urine Clean Catch (Midstream)  Final Report (08-29-20):    <10,000 CFU/mL Normal Urogenital Nelly        RADIOLOGY & ADDITIONAL TESTS:          Imaging Personally Reviewed:  [ ] YES  [ ] NO    Consultant(s) Notes Reviewed:  [ ] YES  [ ] NO    Care Discussed with Consultants/Other Providers [x ] YES  [ ] NO

## 2020-08-31 NOTE — DISCHARGE NOTE PROVIDER - NSDCFUADDAPPT_GEN_ALL_CORE_FT
Please follow up with your primary care physician within one week of your discharge. Take your home medications as prescribed.    Please follow up with your surgical oncologist within one week of your discharge.

## 2020-08-31 NOTE — DISCHARGE NOTE PROVIDER - NSDCFUADDINST_GEN_ALL_CORE_FT
Please follow up with your surgical oncologist ASAP Please return to the emergency room if you experience recurrence/worsening of symptoms, or onset of new symptoms such as fever, chills, chest pain, shortness of breath, intractable pain, inability to have a bowel movement.

## 2020-08-31 NOTE — DISCHARGE NOTE PROVIDER - HOSPITAL COURSE
HPI:    61 y/o female with PMHx breast CA diagnosed in 2017 (s/p bilateral mastectomy and breast reconstruction), abdominal carcinomatosis (s/p chemo/radiation and hysterectomy), HTN, GERD, sleep apnea c/o periumbilical abdominal pain x 3 days, non-radiating. Patient had a BM and flatus Tuesday. She reported abdominal pain and constipation Wednesday so she took an enema and had BM/flatus after. Reports nausea. Patient denies fever, chills, vomiting, diarrhea, melena, hematochezia, dysuria, hematuria, chest pain, shortness of breath, dizziness, cough. Patient denies prior incident. (28 Aug 2020 05:48)    Pt was admitted with SBO and was treated conservatively with NGT decompression and malignant SBO protocol. Clinically resolved and patient was stable for discharge on 8/31.

## 2020-08-31 NOTE — CONSULT NOTE ADULT - PROBLEM SELECTOR RECOMMENDATION 9
CT abd: Small bowel obstruction likely due to adhesion  NGT to wall suction, NPO, IVF,   IV analgesics, antiemetic, IV octreotide  Mgmt as per surgery
- advance diet as tolerated  - appreciate surgical f/u  - DVT Prophylaxsis

## 2020-08-31 NOTE — CONSULT NOTE ADULT - PROBLEM SELECTOR PROBLEM 1
SBO (small bowel obstruction)
Uterine cancer
SBO (small bowel obstruction)
SBO (small bowel obstruction)

## 2020-08-31 NOTE — DISCHARGE NOTE PROVIDER - NSDCMRMEDTOKEN_GEN_ALL_CORE_FT
acetaminophen 325 mg oral tablet: 2 tab(s) orally every 6 hours, As needed, Temp greater or equal to 38C (100.4F), Mild Pain (1 - 3)  Bystolic 5 mg oral tablet: 1 tab(s) orally once a day  docusate sodium 100 mg oral capsule: 1 cap(s) orally once a day  Klor-Con 10 oral tablet, extended release: 1 tab(s) orally once a day  lisinopril-hydrochlorothiazide 10 mg-12.5 mg oral tablet: 1 tab(s) orally once a day  omeprazole 40 mg oral delayed release capsule: 1 cap(s) orally once a day  Vitamin D2 2000 intl units oral capsule: 1 cap(s) orally once a day Bystolic 5 mg oral tablet: 1 tab(s) orally once a day  docusate sodium 100 mg oral capsule: 1 cap(s) orally once a day  Klor-Con 10 oral tablet, extended release: 1 tab(s) orally once a day  lisinopril-hydrochlorothiazide 10 mg-12.5 mg oral tablet: 1 tab(s) orally once a day  MiraLax oral powder for reconstitution: 1 packet(s) orally once a day MDD:1 packet  omeprazole 40 mg oral delayed release capsule: 1 cap(s) orally once a day  Vitamin D2 2000 intl units oral capsule: 1 cap(s) orally once a day

## 2020-08-31 NOTE — DISCHARGE NOTE PROVIDER - NSDCCPCAREPLAN_GEN_ALL_CORE_FT
PRINCIPAL DISCHARGE DIAGNOSIS  Diagnosis: SBO (small bowel obstruction)  Assessment and Plan of Treatment:       SECONDARY DISCHARGE DIAGNOSES  Diagnosis: Nausea  Assessment and Plan of Treatment:

## 2020-09-01 LAB
ANION GAP SERPL CALC-SCNC: 5 MMOL/L — SIGNIFICANT CHANGE UP (ref 5–17)
BUN SERPL-MCNC: 16 MG/DL — SIGNIFICANT CHANGE UP (ref 7–23)
CALCIUM SERPL-MCNC: 8.4 MG/DL — LOW (ref 8.5–10.1)
CHLORIDE SERPL-SCNC: 106 MMOL/L — SIGNIFICANT CHANGE UP (ref 96–108)
CO2 SERPL-SCNC: 28 MMOL/L — SIGNIFICANT CHANGE UP (ref 22–31)
CREAT SERPL-MCNC: 0.79 MG/DL — SIGNIFICANT CHANGE UP (ref 0.5–1.3)
GLUCOSE SERPL-MCNC: 129 MG/DL — HIGH (ref 70–99)
HCT VFR BLD CALC: 31.5 % — LOW (ref 34.5–45)
HGB BLD-MCNC: 10.6 G/DL — LOW (ref 11.5–15.5)
MAGNESIUM SERPL-MCNC: 2.4 MG/DL — SIGNIFICANT CHANGE UP (ref 1.6–2.6)
MCHC RBC-ENTMCNC: 33.1 PG — SIGNIFICANT CHANGE UP (ref 27–34)
MCHC RBC-ENTMCNC: 33.7 GM/DL — SIGNIFICANT CHANGE UP (ref 32–36)
MCV RBC AUTO: 98.4 FL — SIGNIFICANT CHANGE UP (ref 80–100)
NRBC # BLD: 0 /100 WBCS — SIGNIFICANT CHANGE UP (ref 0–0)
PHOSPHATE SERPL-MCNC: 3.2 MG/DL — SIGNIFICANT CHANGE UP (ref 2.5–4.5)
PLATELET # BLD AUTO: 210 K/UL — SIGNIFICANT CHANGE UP (ref 150–400)
POTASSIUM SERPL-MCNC: 3.5 MMOL/L — SIGNIFICANT CHANGE UP (ref 3.5–5.3)
POTASSIUM SERPL-SCNC: 3.5 MMOL/L — SIGNIFICANT CHANGE UP (ref 3.5–5.3)
RBC # BLD: 3.2 M/UL — LOW (ref 3.8–5.2)
RBC # FLD: 13.5 % — SIGNIFICANT CHANGE UP (ref 10.3–14.5)
SODIUM SERPL-SCNC: 139 MMOL/L — SIGNIFICANT CHANGE UP (ref 135–145)
WBC # BLD: 4.18 K/UL — SIGNIFICANT CHANGE UP (ref 3.8–10.5)
WBC # FLD AUTO: 4.18 K/UL — SIGNIFICANT CHANGE UP (ref 3.8–10.5)

## 2020-09-01 PROCEDURE — 99232 SBSQ HOSP IP/OBS MODERATE 35: CPT

## 2020-09-01 PROCEDURE — 99233 SBSQ HOSP IP/OBS HIGH 50: CPT

## 2020-09-01 RX ADMIN — Medication 102 MILLIGRAM(S): at 21:37

## 2020-09-01 RX ADMIN — Medication 102 MILLIGRAM(S): at 05:57

## 2020-09-01 RX ADMIN — ISOSORBIDE MONONITRATE 30 MILLIGRAM(S): 60 TABLET, EXTENDED RELEASE ORAL at 12:18

## 2020-09-01 RX ADMIN — ENOXAPARIN SODIUM 40 MILLIGRAM(S): 100 INJECTION SUBCUTANEOUS at 18:08

## 2020-09-01 RX ADMIN — NEBIVOLOL HYDROCHLORIDE 5 MILLIGRAM(S): 5 TABLET ORAL at 05:58

## 2020-09-01 RX ADMIN — Medication 102 MILLIGRAM(S): at 15:41

## 2020-09-01 RX ADMIN — LISINOPRIL 10 MILLIGRAM(S): 2.5 TABLET ORAL at 05:58

## 2020-09-01 RX ADMIN — Medication 10 MILLIGRAM(S): at 15:41

## 2020-09-01 RX ADMIN — PANTOPRAZOLE SODIUM 40 MILLIGRAM(S): 20 TABLET, DELAYED RELEASE ORAL at 12:18

## 2020-09-01 RX ADMIN — Medication 10 MILLIGRAM(S): at 05:58

## 2020-09-01 RX ADMIN — Medication 10 MILLIGRAM(S): at 21:37

## 2020-09-01 RX ADMIN — OCTREOTIDE ACETATE 5 MICROGRAM(S)/HR: 200 INJECTION, SOLUTION INTRAVENOUS; SUBCUTANEOUS at 16:21

## 2020-09-02 LAB
ANION GAP SERPL CALC-SCNC: 7 MMOL/L — SIGNIFICANT CHANGE UP (ref 5–17)
BUN SERPL-MCNC: 16 MG/DL — SIGNIFICANT CHANGE UP (ref 7–23)
CALCIUM SERPL-MCNC: 8.5 MG/DL — SIGNIFICANT CHANGE UP (ref 8.5–10.1)
CHLORIDE SERPL-SCNC: 103 MMOL/L — SIGNIFICANT CHANGE UP (ref 96–108)
CO2 SERPL-SCNC: 28 MMOL/L — SIGNIFICANT CHANGE UP (ref 22–31)
CREAT SERPL-MCNC: 0.67 MG/DL — SIGNIFICANT CHANGE UP (ref 0.5–1.3)
GLUCOSE SERPL-MCNC: 126 MG/DL — HIGH (ref 70–99)
HCT VFR BLD CALC: 32.1 % — LOW (ref 34.5–45)
HGB BLD-MCNC: 11 G/DL — LOW (ref 11.5–15.5)
MAGNESIUM SERPL-MCNC: 2.5 MG/DL — SIGNIFICANT CHANGE UP (ref 1.6–2.6)
MCHC RBC-ENTMCNC: 33.5 PG — SIGNIFICANT CHANGE UP (ref 27–34)
MCHC RBC-ENTMCNC: 34.3 GM/DL — SIGNIFICANT CHANGE UP (ref 32–36)
MCV RBC AUTO: 97.9 FL — SIGNIFICANT CHANGE UP (ref 80–100)
NRBC # BLD: 0 /100 WBCS — SIGNIFICANT CHANGE UP (ref 0–0)
PHOSPHATE SERPL-MCNC: 3.3 MG/DL — SIGNIFICANT CHANGE UP (ref 2.5–4.5)
PLATELET # BLD AUTO: 217 K/UL — SIGNIFICANT CHANGE UP (ref 150–400)
POTASSIUM SERPL-MCNC: 3.4 MMOL/L — LOW (ref 3.5–5.3)
POTASSIUM SERPL-SCNC: 3.4 MMOL/L — LOW (ref 3.5–5.3)
RBC # BLD: 3.28 M/UL — LOW (ref 3.8–5.2)
RBC # FLD: 13.3 % — SIGNIFICANT CHANGE UP (ref 10.3–14.5)
SODIUM SERPL-SCNC: 138 MMOL/L — SIGNIFICANT CHANGE UP (ref 135–145)
WBC # BLD: 4.08 K/UL — SIGNIFICANT CHANGE UP (ref 3.8–10.5)
WBC # FLD AUTO: 4.08 K/UL — SIGNIFICANT CHANGE UP (ref 3.8–10.5)

## 2020-09-02 PROCEDURE — 99232 SBSQ HOSP IP/OBS MODERATE 35: CPT

## 2020-09-02 PROCEDURE — 99233 SBSQ HOSP IP/OBS HIGH 50: CPT

## 2020-09-02 RX ORDER — POLYETHYLENE GLYCOL 3350 17 G/17G
17 POWDER, FOR SOLUTION ORAL ONCE
Refills: 0 | Status: COMPLETED | OUTPATIENT
Start: 2020-09-02 | End: 2020-09-02

## 2020-09-02 RX ORDER — POTASSIUM CHLORIDE 20 MEQ
20 PACKET (EA) ORAL ONCE
Refills: 0 | Status: COMPLETED | OUTPATIENT
Start: 2020-09-02 | End: 2020-09-02

## 2020-09-02 RX ADMIN — NEBIVOLOL HYDROCHLORIDE 5 MILLIGRAM(S): 5 TABLET ORAL at 05:48

## 2020-09-02 RX ADMIN — POLYETHYLENE GLYCOL 3350 17 GRAM(S): 17 POWDER, FOR SOLUTION ORAL at 11:05

## 2020-09-02 RX ADMIN — ENOXAPARIN SODIUM 40 MILLIGRAM(S): 100 INJECTION SUBCUTANEOUS at 17:29

## 2020-09-02 RX ADMIN — PANTOPRAZOLE SODIUM 40 MILLIGRAM(S): 20 TABLET, DELAYED RELEASE ORAL at 11:04

## 2020-09-02 RX ADMIN — Medication 102 MILLIGRAM(S): at 05:48

## 2020-09-02 RX ADMIN — Medication 10 MILLIGRAM(S): at 05:48

## 2020-09-02 RX ADMIN — Medication 20 MILLIEQUIVALENT(S): at 09:14

## 2020-09-02 RX ADMIN — LISINOPRIL 10 MILLIGRAM(S): 2.5 TABLET ORAL at 05:48

## 2020-09-02 RX ADMIN — ISOSORBIDE MONONITRATE 30 MILLIGRAM(S): 60 TABLET, EXTENDED RELEASE ORAL at 11:04

## 2020-09-03 ENCOUNTER — TRANSCRIPTION ENCOUNTER (OUTPATIENT)
Age: 60
End: 2020-09-03

## 2020-09-03 VITALS
DIASTOLIC BLOOD PRESSURE: 73 MMHG | SYSTOLIC BLOOD PRESSURE: 127 MMHG | TEMPERATURE: 98 F | OXYGEN SATURATION: 98 % | HEART RATE: 59 BPM | RESPIRATION RATE: 18 BRPM

## 2020-09-03 PROCEDURE — 99233 SBSQ HOSP IP/OBS HIGH 50: CPT

## 2020-09-03 PROCEDURE — 99232 SBSQ HOSP IP/OBS MODERATE 35: CPT

## 2020-09-03 RX ORDER — POTASSIUM CHLORIDE 20 MEQ
20 PACKET (EA) ORAL ONCE
Refills: 0 | Status: COMPLETED | OUTPATIENT
Start: 2020-09-03 | End: 2020-09-03

## 2020-09-03 RX ORDER — POLYETHYLENE GLYCOL 3350 17 G/17G
1 POWDER, FOR SOLUTION ORAL
Qty: 7 | Refills: 0
Start: 2020-09-03 | End: 2020-09-09

## 2020-09-03 RX ADMIN — Medication 20 MILLIEQUIVALENT(S): at 08:34

## 2020-09-03 RX ADMIN — ENOXAPARIN SODIUM 40 MILLIGRAM(S): 100 INJECTION SUBCUTANEOUS at 17:22

## 2020-09-03 RX ADMIN — PANTOPRAZOLE SODIUM 40 MILLIGRAM(S): 20 TABLET, DELAYED RELEASE ORAL at 11:23

## 2020-09-03 RX ADMIN — ISOSORBIDE MONONITRATE 30 MILLIGRAM(S): 60 TABLET, EXTENDED RELEASE ORAL at 11:23

## 2020-09-03 NOTE — DIETITIAN INITIAL EVALUATION ADULT. - PERTINENT LABORATORY DATA
09-02 Na138 mmol/L Glu 126 mg/dL<H> K+ 3.4 mmol/L<L> Cr  0.67 mg/dL BUN 16 mg/dL 09-02 Phos 3.3 mg/dL 08-28 Alb 3.4 g/dL

## 2020-09-03 NOTE — DIETITIAN INITIAL EVALUATION ADULT. - PHYSICAL APPEARANCE
other (specify)/BMI = 27.7; 1+ edema noted right arm/well nourished Only visible signs of malnutrition mild temple & clavicle muscle wasting

## 2020-09-03 NOTE — PROGRESS NOTE ADULT - REASON FOR ADMISSION
Abdominal pain

## 2020-09-03 NOTE — PROGRESS NOTE ADULT - PROBLEM SELECTOR PLAN 2
hold HCTZ to avoid adverse outcomes   cont Bystolic, isosorbide and Lisinopril   will monitor and adjust as needed.

## 2020-09-03 NOTE — PROGRESS NOTE ADULT - PROBLEM SELECTOR PROBLEM 1
SBO (small bowel obstruction)
Uterine cancer
SBO (small bowel obstruction)

## 2020-09-03 NOTE — PROGRESS NOTE ADULT - PROBLEM SELECTOR PLAN 4
F/u OP with Dr. Vital
F/u OP with Dr. Vital
PMHx breast CA diagnosed in 2017 (s/p bilateral mastectomy and breast reconstruction), abdominal carcinomatosis (s/p chemo/radiation and hysterectomy)  F/u OP with Dr. Vital
F/u OP with Dr. Vital

## 2020-09-03 NOTE — CHART NOTE - NSCHARTNOTEFT_GEN_A_CORE
Upon Nutritional Assessment by the Registered Dietitian your patient was determined to meet criteria / has evidence of the following diagnosis/diagnoses:          [ ]  Mild Protein Calorie Malnutrition        [X ]  Moderate Protein Calorie Malnutrition        [ ] Severe Protein Calorie Malnutrition        [ ] Unspecified Protein Calorie Malnutrition        [ ] Underweight / BMI <19        [ ] Morbid Obesity / BMI > 40      Findings as based on:  •  Comprehensive nutrition assessment and consultation  •  Calorie counts (nutrient intake analysis)  •  Food acceptance and intake status from observations by staff  •  Follow up  •  Patient education  •  Intervention secondary to interdisciplinary rounds  •   concerns      Treatment:    The following diet has been recommended:  continue Low Fiber diet      PROVIDER Section:     By signing this assessment you are acknowledging and agree with the diagnosis/diagnoses assigned by the Registered Dietitian    Comments:

## 2020-09-03 NOTE — DIETITIAN INITIAL EVALUATION ADULT. - ETIOLOGY
Inadequate energy/protein intake + increased energy/protein needs related to breast Ca, uterine Ca s/p hysterectomy, chemo & radiation

## 2020-09-03 NOTE — DIETITIAN INITIAL EVALUATION ADULT. - ENERGY NEEDS
Height (cm): 170.18 (08-28)  Weight (kg): 80.1 (09-02)  BMI (kg/m2): 27.7 (09-02)  IBW:  61.4 kg +/- 10%   % IBW:  130%    UBW:  89.8 kg     %UBW: 90%

## 2020-09-03 NOTE — PROGRESS NOTE ADULT - SUBJECTIVE AND OBJECTIVE BOX
INTERVAL HPI/OVERNIGHT EVENTS:  Pt. seen and examined at bedside with Dr. Resendiz. Patient c/o abdominal "discomfort" and "gas pains" moving across abdomen after eating solid food. States she is doing better than yesterday afternoon, but still admits to discomfort today. States she only ate a small amount of solid food because she cannot tolerate too much. Passed a small amount of flatus this morning, no BM.  Denies fever/chills, chest pain, dyspnea, cough, dizziness.     Vital Signs Last 24 Hrs  T(C): 36.8 (01 Sep 2020 06:18), Max: 37 (31 Aug 2020 17:32)  T(F): 98.3 (01 Sep 2020 06:18), Max: 98.6 (31 Aug 2020 17:32)  HR: 60 (01 Sep 2020 06:18) (51 - 93)  BP: 164/81 (01 Sep 2020 06:18) (138/78 - 164/81)  RR: 18 (01 Sep 2020 06:18) (17 - 18)  SpO2: 98% (01 Sep 2020 06:18) (98% - 100%)    PHYSICAL EXAM:    GENERAL: NAD  CHEST/LUNG: Clear to ausculation, bilaterally   HEART: S1S2, RRR  ABDOMEN: non distended, +BS, soft, +Left sided tenderness, no guarding  EXTREMITIES:  calf soft, non tender b/l. 2+ distal pulses b/l.     I&O's Detail    31 Aug 2020 07:01  -  01 Sep 2020 07:00  --------------------------------------------------------  IN:    octreotide  Infusion: 60 mL    Oral Fluid: 100 mL    Solution: 100 mL  Total IN: 260 mL    OUT:  Total OUT: 0 mL    Total NET: 260 mL      LABS:                        10.6   4.18  )-----------( 210      ( 01 Sep 2020 06:28 )             31.5     09-01    139  |  106  |  16  ----------------------------<  129<H>  3.5   |  28  |  0.79    Ca    8.4<L>      01 Sep 2020 06:28  Phos  3.2     09-01  Mg     2.4     09-01      Assessment: 60y Female with PMHx breast CA  (s/p bilateral mastectomy and breast reconstruction), abdominal carcinomatosis (s/p chemo/radiation and hysterectomy), HTN, GERD, sleep apnea admitted with SBO, resolving with malignant SBO protocol and conservative management. S/s consistent with partial SBO.     Plan:  -Continue low fiber diet, instructed to go slow, and eat small amounts as tolerated  -Serial abd exams, monitor for GI function  -continue VTE and GI prophylaxis  -continue malignant SBO protocol (Decadron, Reglan, Octreotide)  -medical management as per medicine and oncology  -DVt ppx, OOB to ambulate  -Oncology consult appreciated (Patient of Dr. Vital), will need F/u OP with Dr. Vital  -Possible OR if no improvement and unable to tolerate diet, or for clinical deterioration   -D/C planning once patient tolerating a regular diet with normal bowel function   Discussed with Dr. Resendiz
Surgery NP note   Patient seen and examined bedside resting comfortably.  No complaints offered.   Denies nausea and vomiting. NPO.  +flatus, denies BM.    T(F): 98.4 (08-30-20 @ 05:26), Max: 98.4 (08-30-20 @ 05:26)  HR: 66 (08-30-20 @ 05:26) (64 - 69)  BP: 148/84 (08-30-20 @ 05:26) (124/72 - 158/85)  RR: 17 (08-30-20 @ 05:26) (17 - 18)  SpO2: 98% (08-30-20 @ 05:26) (97% - 98%)  Wt(kg): --  CAPILLARY BLOOD GLUCOSE          PHYSICAL EXAM:  General: NAD, WDWN.   Neuro:  Alert & responsive  HEENT: NGT in place and clamped  CV: +S1+S2 regular rate and rhythm  Lung: clear to ausculation bilaterally, respirations nonlabored, good inspiratory effort  Abdomen: soft, Non tender, No Distension. Normal active BS, no guarding  Extremities: no pedal edema or calf tenderness noted     LABS:                        10.6   4.91  )-----------( 209      ( 30 Aug 2020 06:56 )             33.1     08-30    140  |  106  |  12  ----------------------------<  110<H>  4.0   |  28  |  0.84    Ca    8.9      30 Aug 2020 06:56  Phos  3.2     08-30  Mg     2.4     08-30    TPro  7.2  /  Alb  3.4  /  TBili  0.5  /  DBili  x   /  AST  17  /  ALT  14  /  AlkPhos  56  08-28    LIVER FUNCTIONS - ( 28 Aug 2020 14:51 )  Alb: 3.4 g/dL / Pro: 7.2 gm/dL / ALK PHOS: 56 U/L / ALT: 14 U/L / AST: 17 U/L / GGT: x             I&O's Detail    29 Aug 2020 07:01  -  30 Aug 2020 07:00  --------------------------------------------------------  IN:    dextrose 5% + sodium chloride 0.45% with potassium chloride 20 mEq/L: 2700 mL    octreotide  Infusion: 120 mL    Oral Fluid: 240 mL    Solution: 300 mL  Total IN: 3360 mL    OUT:    Nasoenteral Tube: 800 mL  Total OUT: 800 mL    Total NET: 2560 mL            Impression: 60y Female with PMHx breast CA diagnosed in 2017 (s/p bilateral mastectomy and breast reconstruction), abdominal carcinomatosis (s/p chemo/radiation and hysterectomy), HTN, GERD, sleep apnea admitted with malignant SBO.       Plan:  -continue NPO, IVF  -clamp trial started at 10am  -continue VTE and GI prophylaxis  -continue malignant SBO protocol (Decadron, Reglan, Octreotide)  -medical management as per medicine  -anti-emetic PRN  -Increase activity with PT, OOB, Ambulate  -continue incentive spirometry use  -discussed with Dr. Resendiz
INTERVAL HPI/OVERNIGHT EVENTS:  Pt. seen and examined at bedside with Dr. Resendiz. Patient in good spirits, resting comfortably. States her pain is much improved, only with mild abdominal "soreness" at this time, well controlled without pain meds. Tolerating clear liquids, Denies N/V. States she is passing "a lot of flatus", no BM. Ambulating often.   Denies fever/chills, chest pain, dyspnea, cough, dizziness.     Vital Signs Last 24 Hrs  T(C): 36.8 (31 Aug 2020 05:11), Max: 36.8 (30 Aug 2020 23:20)  T(F): 98.2 (31 Aug 2020 05:11), Max: 98.2 (30 Aug 2020 23:20)  HR: 58 (31 Aug 2020 05:11) (58 - 64)  BP: 140/71 (31 Aug 2020 05:11) (140/71 - 162/92)  RR: 17 (31 Aug 2020 05:11) (17 - 18)  SpO2: 100% (31 Aug 2020 05:11) (97% - 100%)    PHYSICAL EXAM:    GENERAL: NAD  CHEST/LUNG: Clear to ausculation, bilaterally   HEART: S1S2  ABDOMEN: non distended, +BS, soft, non tender, no guarding  EXTREMITIES:  calf soft, non tender b/l. 2+ distal pulses b/l.     I&O's Detail    30 Aug 2020 07:01  -  31 Aug 2020 07:00  --------------------------------------------------------  IN:    dextrose 5% + sodium chloride 0.45% with potassium chloride 20 mEq/L: 1500 mL    octreotide  Infusion: 60 mL    Solution: 100 mL  Total IN: 1660 mL    OUT:  Total OUT: 0 mL    Total NET: 1660 mL    LABS:                        11.2   4.74  )-----------( 212      ( 31 Aug 2020 08:08 )             33.5     08-31    141  |  107  |  13  ----------------------------<  126<H>  3.9   |  27  |  0.73    Ca    9.0      31 Aug 2020 08:08  Phos  2.7     08-31  Mg     2.3     08-31      Assessment: 60y Female with PMHx breast CA  (s/p bilateral mastectomy and breast reconstruction), abdominal carcinomatosis (s/p chemo/radiation and hysterectomy), HTN, GERD, sleep apnea admitted with malignant SBO-resolving s/p malignant SBO protocol.     Plan:  -Advance to low fiber diet as tolerated, D/C IVF  -continue VTE and GI prophylaxis  -continue malignant SBO protocol (Decadron, Reglan, Octreotide)  -medical management as per medicine and oncology  -DVt ppx, OOB to ambulate  -Oncology consult appreciated (Patient of Dr. Vital), will need F/u OP with Dr. Vital  -D/C planning once patient tolerating a regular diet  Discussed with Dr. Resendiz
Lying in bed    Vital Signs Last 24 Hrs  T(C): 36.8 (01 Sep 2020 06:18), Max: 37 (31 Aug 2020 17:32)  T(F): 98.3 (01 Sep 2020 06:18), Max: 98.6 (31 Aug 2020 17:32)  HR: 60 (01 Sep 2020 06:18) (51 - 93)  BP: 164/81 (01 Sep 2020 06:18) (138/78 - 164/81)  BP(mean): --  RR: 18 (01 Sep 2020 06:18) (17 - 18)  SpO2: 98% (01 Sep 2020 06:18) (98% - 100%)    PHYSICAL EXAM:    general - AAO x 3  HEENT - No Icterus  CVS - RRR  RS - AE B/L  Abd - soft, NT  Ext - Pulses +        LABS:                        10.6   4.18  )-----------( 210      ( 01 Sep 2020 06:28 )             31.5     09-01    139  |  106  |  16  ----------------------------<  129<H>  3.5   |  28  |  0.79    Ca    8.4<L>      01 Sep 2020 06:28  Phos  3.2     09-01  Mg     2.4     09-01            Culture - Urine (collected 28 Aug 2020 11:06)  Source: .Urine Clean Catch (Midstream)  Final Report (29 Aug 2020 09:12):    <10,000 CFU/mL Normal Urogenital Nelly        RADIOLOGY & ADDITIONAL STUDIES:
Patient is a 60y old  Female who presents with a chief complaint of Abdominal pain (01 Sep 2020 10:25)      OVERNIGHT EVENTS: none     REVIEW OF SYSTEMS: denies chest pain/SOB, diaphoresis, no F/C, cough, dizziness, headache, blurry vision, nausea, vomiting, abdominal pain. All others review of systems negative     MEDICATIONS  (STANDING):  dexAMETHasone  IVPB 10 milliGRAM(s) IV Intermittent every 8 hours  enoxaparin Injectable 40 milliGRAM(s) SubCutaneous daily  isosorbide   mononitrate ER Tablet (IMDUR) 30 milliGRAM(s) Oral daily  lisinopril 10 milliGRAM(s) Oral daily  metoclopramide Injectable 10 milliGRAM(s) IV Push every 8 hours  nebivolol 5 milliGRAM(s) Oral daily  octreotide  Infusion 25 MICROgram(s)/Hr (5 mL/Hr) IV Continuous <Continuous>  pantoprazole  Injectable 40 milliGRAM(s) IV Push daily    MEDICATIONS  (PRN):  hydrALAZINE Injectable 10 milliGRAM(s) IV Push every 6 hours PRN SBP > 160      Allergies    No Known Allergies    Intolerances        T(F): 98.7 (09-01-20 @ 11:02), Max: 98.7 (09-01-20 @ 11:02)  HR: 56 (09-01-20 @ 11:02) (56 - 93)  BP: 115/80 (09-01-20 @ 11:02) (115/80 - 164/81)  RR: 18 (09-01-20 @ 11:02) (17 - 18)  SpO2: 97% (09-01-20 @ 11:02) (97% - 100%)  Wt(kg): --    PHYSICAL EXAM:  GENERAL: NAD  HEAD:  Atraumatic, Normocephalic  EYES: PERRLA, conjunctiva and sclera clear  ENMT: Moist mucous membranes  NECK: Supple, No JVD, Normal thyroid  NERVOUS SYSTEM:  Alert & Awake  CHEST/LUNG: Clear to percussion bilaterally;   HEART: Regular rate and rhythm;   ABDOMEN: Soft, Nontender, Nondistended; Bowel sounds present  EXTREMITIES:  no edema BL LE  SKIN: moist    LABS:                        10.6   4.18  )-----------( 210      ( 01 Sep 2020 06:28 )             31.5     09-01    139  |  106  |  16  ----------------------------<  129<H>  3.5   |  28  |  0.79    Ca    8.4<L>      01 Sep 2020 06:28  Phos  3.2     09-01  Mg     2.4     09-01          Cultures;   CAPILLARY BLOOD GLUCOSE        Lipid panel:           RADIOLOGY & ADDITIONAL TESTS:    Imaging Personally Reviewed:  [x ] YES      Consultant(s) Notes Reviewed:  [x ] YES     Care Discussed with [x ] Consultants [X ] Patient [ ] Family  [x ]    [x ]  Other; RN
Patient is a 60y old  Female who presents with a chief complaint of Abdominal pain (03 Sep 2020 09:48)      OVERNIGHT EVENTS: none     REVIEW OF SYSTEMS: denies chest pain/SOB, diaphoresis, no F/C, cough, dizziness, headache, blurry vision, nausea, vomiting, abdominal pain. All others review of systems negative     MEDICATIONS  (STANDING):  enoxaparin Injectable 40 milliGRAM(s) SubCutaneous daily  isosorbide   mononitrate ER Tablet (IMDUR) 30 milliGRAM(s) Oral daily  lisinopril 10 milliGRAM(s) Oral daily  nebivolol 5 milliGRAM(s) Oral daily  pantoprazole  Injectable 40 milliGRAM(s) IV Push daily    MEDICATIONS  (PRN):  hydrALAZINE Injectable 10 milliGRAM(s) IV Push every 6 hours PRN SBP > 160      Allergies    No Known Allergies    Intolerances        T(F): 98.7 (09-03-20 @ 06:19), Max: 98.7 (09-03-20 @ 06:19)  HR: 51 (09-03-20 @ 06:19) (51 - 62)  BP: 131/79 (09-03-20 @ 06:19) (131/79 - 147/79)  RR: 18 (09-03-20 @ 06:19) (17 - 18)  SpO2: 99% (09-03-20 @ 06:19) (97% - 99%)  Wt(kg): --    PHYSICAL EXAM:  GENERAL: NAD  HEAD:  Atraumatic, Normocephalic  EYES: PERRLA, conjunctiva and sclera clear  ENMT: Moist mucous membranes  NECK: Supple, No JVD, Normal thyroid  NERVOUS SYSTEM:  Alert & Awake  CHEST/LUNG: Clear to percussion bilaterally;   HEART: Regular rate and rhythm;   ABDOMEN: Soft, Nontender, Nondistended; Bowel sounds present  EXTREMITIES:  no edema BL LE  SKIN: moist    LABS:                        11.0   4.08  )-----------( 217      ( 02 Sep 2020 06:34 )             32.1     09-02    138  |  103  |  16  ----------------------------<  126<H>  3.4<L>   |  28  |  0.67    Ca    8.5      02 Sep 2020 06:34  Phos  3.3     09-02  Mg     2.5     09-02          Cultures;   CAPILLARY BLOOD GLUCOSE        Lipid panel:           RADIOLOGY & ADDITIONAL TESTS:    Imaging Personally Reviewed:  [x ] YES      Consultant(s) Notes Reviewed:  [x ] YES     Care Discussed with [x ] Consultants [X ] Patient [ ] Family  [x ]    [x ]  Other; RN
Patient is a 60y old  Female who presents with a chief complaint of Abdominal pain (31 Aug 2020 12:25)      OVERNIGHT EVENTS:  none    REVIEW OF SYSTEMS: denies chest pain/SOB, diaphoresis, no F/C, cough, dizziness, headache, blurry vision, nausea, vomiting, abdominal pain. All others review of systems negative     MEDICATIONS  (STANDING):  dexAMETHasone  IVPB 10 milliGRAM(s) IV Intermittent every 8 hours  dextrose 5% + sodium chloride 0.45% with potassium chloride 20 mEq/L 1000 milliLiter(s) (30 mL/Hr) IV Continuous <Continuous>  enoxaparin Injectable 40 milliGRAM(s) SubCutaneous daily  isosorbide   mononitrate ER Tablet (IMDUR) 30 milliGRAM(s) Oral daily  lisinopril 10 milliGRAM(s) Oral daily  metoclopramide Injectable 10 milliGRAM(s) IV Push every 8 hours  nebivolol 5 milliGRAM(s) Oral daily  octreotide  Infusion 25 MICROgram(s)/Hr (5 mL/Hr) IV Continuous <Continuous>  pantoprazole  Injectable 40 milliGRAM(s) IV Push daily    MEDICATIONS  (PRN):  hydrALAZINE Injectable 10 milliGRAM(s) IV Push every 6 hours PRN SBP > 160      Allergies    No Known Allergies    Intolerances        T(F): 97.4 (08-31-20 @ 12:32), Max: 98.2 (08-30-20 @ 23:20)  HR: 51 (08-31-20 @ 12:32) (51 - 62)  BP: 158/80 (08-31-20 @ 12:32) (140/71 - 162/92)  RR: 18 (08-31-20 @ 12:32) (17 - 18)  SpO2: 100% (08-31-20 @ 12:32) (98% - 100%)  Wt(kg): --    PHYSICAL EXAM:  GENERAL: NAD  HEAD:  Atraumatic, Normocephalic  EYES: PERRLA, conjunctiva and sclera clear  ENMT: Moist mucous membranes  NECK: Supple, No JVD, Normal thyroid  NERVOUS SYSTEM:  Alert & Awake  CHEST/LUNG: Clear to percussion bilaterally;   HEART: Regular rate and rhythm;   ABDOMEN: Soft, Nontender, Nondistended; Bowel sounds present  EXTREMITIES:  no edema BL LE  SKIN: moist    LABS:                        11.2   4.74  )-----------( 212      ( 31 Aug 2020 08:08 )             33.5     08-31    141  |  107  |  13  ----------------------------<  126<H>  3.9   |  27  |  0.73    Ca    9.0      31 Aug 2020 08:08  Phos  2.7     08-31  Mg     2.3     08-31          Cultures;   CAPILLARY BLOOD GLUCOSE        Lipid panel:           RADIOLOGY & ADDITIONAL TESTS:    Imaging Personally Reviewed:  [x ] YES      Consultant(s) Notes Reviewed:  [x ] YES     Care Discussed with [x ] Consultants [X ] Patient [ ] Family  [x ]    [x ]  Other; RN
Patient seen and examined at bedside in no distress.  Tolerating regular diet.  Reports 2 BMs overnight.  Denies abdominal pain, nausea, vomiting.  Denies fever, chills, chest pain, sob.    Vital Signs Last 24 Hrs  T(F): 98.7 (09-03-20 @ 06:19), Max: 98.7 (09-03-20 @ 06:19)  HR: 51 (09-03-20 @ 06:19)  BP: 131/79 (09-03-20 @ 06:19)  RR: 18 (09-03-20 @ 06:19)  SpO2: 99% (09-03-20 @ 06:19)    GENERAL: Alert, oriented, NAD  CHEST/LUNG: Clear to auscultation bilaterally, respirations nonlabored  HEART: S1S2, RRR  ABDOMEN: + Bowel sounds, soft, NTND  EXTREMITIES: No pedal edema b/l     LABS:                        11.0   4.08  )-----------( 217      ( 02 Sep 2020 06:34 )             32.1     09-02    138  |  103  |  16  ----------------------------<  126<H>  3.4<L>   |  28  |  0.67      A/P: 60F PMH breast CA  (s/p bilateral mastectomy and breast reconstruction), abdominal carcinomatosis (s/p chemo/radiation and hysterectomy), HTN, GERD, sleep apnea a/w SBO. Hypokalemia.  - continue regular diet  - PO K x 1  - discharge planning to home, patient agrees to f/u closely with her PCP and surgical oncologist  - will d/w attending
Patient seen and examined at bedside with Dr. Kelsey.  Tolerating regular diet. Denies abdominal pain, nausea, vomiting.  +Flatus. No BM this week.  Has been OOB to ambulate.  Denies fever, chills, chest pain, sob.    Vital Signs Last 24 Hrs  T(F): 98.1 (09-02-20 @ 06:13), Max: 98.8 (09-01-20 @ 17:00)  HR: 60 (09-02-20 @ 06:13)  BP: 167/83 (09-02-20 @ 06:13)  RR: 18 (09-02-20 @ 06:13)  SpO2: 97% (09-02-20 @ 06:13)    GENERAL: Alert, oriented, NAD  CHEST/LUNG: Respirations nonlabored  HEART: S1S2, RRR  ABDOMEN: + Bowel sounds, soft, nondistended, nontender  EXTREMITIES: No pedal edema b/l     LABS:                        11.0   4.08  )-----------( 217      ( 02 Sep 2020 06:34 )             32.1     09-02    138  |  103  |  16  ----------------------------<  126<H>  3.4<L>   |  28  |  0.67    Ca    8.5      02 Sep 2020 06:34  Phos  3.3     09-02  Mg     2.5     09-02      A/P: 60F PMH breast CA  (s/p bilateral mastectomy and breast reconstruction), abdominal carcinomatosis (s/p chemo/radiation and hysterectomy), HTN, GERD, sleep apnea a/w SBO. Hypokalemia.  - diet as tolerated  - miralax stat  - replete K  - DVT ppx  - ambulate as tolerated  - discharge planning once patient has improved bowel function  - discussed with Dr. Kelsey
Pt seen and examined at bedside with Dr. Resendiz. Patient c/o intermittent crampy LUQ, and periumbilical abdominal pain, slightly better then this morning. Denies N/V, Tolerating NGT. Denies flatus or BM.  Denies fever/chills, chest pain, sob, dizziness, cough.     Vital Signs Last 24 Hrs  T(C): 36.6 (28 Aug 2020 12:13), Max: 37.1 (28 Aug 2020 01:37)  T(F): 97.9 (28 Aug 2020 12:13), Max: 98.8 (28 Aug 2020 01:37)  HR: 85 (28 Aug 2020 12:13) (72 - 98)  BP: 146/81 (28 Aug 2020 12:13) (145/80 - 175/82)  RR: 18 (28 Aug 2020 12:13) (18 - 20)  SpO2: 98% (28 Aug 2020 12:13) (98% - 99%)    PHYSICAL EXAM:    GENERAL: NAD. NGT in place with thick gastric output (~150cc) -- NGT pulled back ~6inches  CHEST/LUNG: Clear to ausculation, bilaterally   HEART: S1S2, RRR  ABDOMEN: Old healed surgical scars noted. Mildly distended, +hypoactive BS, Soft, +LUQ tenderness and periumbilical tenderness to palpation, no guarding or rebound   EXTREMITIES:  calf soft, non tender b/l. 2+ distal pulses b/l.     I&O's Detail      LABS:                        11.8   6.66  )-----------( 202      ( 28 Aug 2020 14:51 )             34.7     08-28    140  |  104  |  8   ----------------------------<  110<H>  3.9   |  27  |  0.79    Ca    9.1      28 Aug 2020 14:51  Phos  2.8     08-  Mg     2.0     -    TPro  7.2  /  Alb  3.4  /  TBili  0.5  /  DBili  x   /  AST  17  /  ALT  14  /  AlkPhos  56  08-28    PT/INR - ( 28 Aug 2020 02:37 )   PT: 13.6 sec;   INR: 1.18 ratio         PTT - ( 28 Aug 2020 02:37 )  PTT:29.1 sec  Urinalysis Basic - ( 28 Aug 2020 05:52 )    Color: Yellow / Appearance: Clear / S.015 / pH: x  Gluc: x / Ketone: Negative  / Bili: Negative / Urobili: Negative mg/dL   Blood: x / Protein: 15 mg/dL / Nitrite: Negative   Leuk Esterase: Small / RBC: 0-2 /HPF / WBC 0-2   Sq Epi: x / Non Sq Epi: Occasional / Bacteria: Occasional    RADIOLOGY & ADDITIONAL STUDIES:  < from: Xray Abdomen 2 Views (20 @ 07:59) >    EXAM:  XR ABDOMEN 2V                            PROCEDURE DATE:  2020          INTERPRETATION:  Follow-up SBO.    AP supine upright abdomen.    Nasogastric tube in position. Compared to CT of earlier the same day there is no significant interval change in distended small bowel with a paucity of colonic gas consistent with a small bowel obstruction. No free air. Contrast noted in the kidneys and bladder from recent CT.    Impression: As above    < end of copied text >    A/P: 59 y/o female with PMHx breast CA diagnosed in 2017 (s/p bilateral mastectomy and breast reconstruction), abdominal carcinomatosis (s/p chemo/radiation and hysterectomy), HTN, GERD, sleep apnea c/o periumbilical abdominal pain x 3 days. Admitted with SBO    -Malignant SBO protocol started per Dr. Kelsey (Reglan, Decadron and Octreotide)  -NPO, IV hydration.  -NGT to LWS, monitor output   -F/u Rpt AXR for NGT position and to see if contrast has moved to the colon  -Analgesia PRN, antiemetic PRN  -Serial abd exams  -DVT ppx, OOB to ambulate as tolerated  -Medical consult for optimization and co-management   -May need surgery if no improvement or patient clinically decompensates   -F/u am labs  -Discussed with Dr. Kelsey
Pt seen and examined at bedside, just returned from showering, no acute issues overnight, +flatus, no BM as yet. Abdominal pain 2/10, controlled with meds. Pt concerned about diet after obstruction clears. NGT removed yesterday, no n/v, tolerating clear liquids.    T(F): 98.2 (08-31-20 @ 05:11), Max: 98.2 (08-30-20 @ 23:20)  HR: 58 (08-31-20 @ 05:11) (58 - 64)  BP: 140/71 (08-31-20 @ 05:11) (140/71 - 162/92)  RR: 17 (08-31-20 @ 05:11) (17 - 18)  SpO2: 100% (08-31-20 @ 05:11) (97% - 100%)  Wt(kg): --  CAPILLARY BLOOD GLUCOSE      PHYSICAL EXAM:  General: NAD, WDWN  Neuro:  Alert & oriented x 3  HEENT: NCAT, EOMI, conjunctiva clear  CV: +S1+S2 regular rate and rhythm  Lung: clear to ausculation bilaterally, respirations nonlabored, good inspiratory effort  Abdomen: scar healed, mild distension, +BSx4, NT  Extremities: no pedal edema or calf tenderness noted     LABS: pending            Culture Results:   <10,000 CFU/mL Normal Urogenital Nelly (08-28 @ 11:06)    Assessment: 60y Female with PMHx breast CA  (s/p bilateral mastectomy and breast reconstruction), abdominal carcinomatosis (s/p chemo/radiation and hysterectomy), HTN, GERD, sleep apnea admitted with malignant SBO-resolving s/p malignant SBO protocol.     Plan:  -continue CLD, poss. advance todaY  -continue VTE and GI prophylaxis  -continue malignant SBO protocol (Decadron, Reglan, Octreotide)  -medical management as per medicine and oncology  -anti-emetic PRN  -cont ambulating  - will discuss with surgical attending
Pt. seen and examined at bedside resting comfortably. Pt is comfortable and tolerating NGT .Denies fever/chills, chest pain, dyspnea, cough, dizziness.     Vital Signs Last 24 Hrs  T(C): 36.9 (30 Aug 2020 05:26), Max: 36.9 (30 Aug 2020 05:26)  T(F): 98.4 (30 Aug 2020 05:26), Max: 98.4 (30 Aug 2020 05:26)  HR: 66 (30 Aug 2020 05:26) (64 - 69)  BP: 148/84 (30 Aug 2020 05:26) (124/72 - 158/85)  BP(mean): --  RR: 17 (30 Aug 2020 05:26) (17 - 18)  SpO2: 98% (30 Aug 2020 05:26) (97% - 98%)    PHYSICAL EXAM:    GENERAL: NAD  HEAD:  Atraumatic, Normocephalic  EYES: EOMI, PERRLA, conjunctiva and sclera clear  CHEST/LUNG: Clear to ausculation, bilaterally   HEART: S1S2  ABDOMEN: non distended, +BS, soft, non tender, no guarding  EXTREMITIES:  calf soft, non tender b/l. 2+ distal pulses b/l.     I&O's Detail    29 Aug 2020 07:01  -  30 Aug 2020 07:00  --------------------------------------------------------  IN:    dextrose 5% + sodium chloride 0.45% with potassium chloride 20 mEq/L: 2700 mL    octreotide  Infusion: 120 mL    Oral Fluid: 240 mL    Solution: 300 mL  Total IN: 3360 mL    OUT:    Nasoenteral Tube: 800 mL  Total OUT: 800 mL    Total NET: 2560 mL          LABS:                        10.6   4.91  )-----------( 209      ( 30 Aug 2020 06:56 )             33.1     08-29    139  |  105  |  7   ----------------------------<  149<H>  4.1   |  27  |  0.73    Ca    8.8      29 Aug 2020 06:49  Phos  3.5     08-29  Mg     2.4     08-29    TPro  7.2  /  Alb  3.4  /  TBili  0.5  /  DBili  x   /  AST  17  /  ALT  14  /  AlkPhos  56  08-28        Culture Results:   <10,000 CFU/mL Normal Urogenital Nelly (08-28 @ 11:06)    type    RADIOLOGY & ADDITIONAL STUDIES:    59 y/o female with PMHx breast CA diagnosed in 2017 (s/p bilateral mastectomy and breast reconstruction), abdominal carcinomatosis (s/p chemo/radiation and hysterectomy), HTN, GERD, sleep apnea admitted with malignant SBO.    Plan:  -NPO,  IVF  -NGT to LWS,, f/u AXR  -malignant SBO protocol (Decadron, Reglan, Octreotide)  -pain management prn  -anti-emetic prn  -DVT prophylaxis: Heparin and IPCs  -OOB, Ambulating, encourage incentive spirometer  -continue local wound care  -Continue medical management/supportive care  -f/u AM labs  -will discuss pt. with surgical attending
SURGERY PROGRESS HPI:  Pt seen and examined at bedside. Abdominal pain is improving and patient feels much better. Pt denies complaints. No acute events overnight. Pt tolerating NGT. Pt denies nausea and vomiting. Passed flatus 4 times yesterday and once today. Voiding well. Pt denies chest pain, SOB, dizziness, fever, chills. Ambulating.    Vital Signs Last 24 Hrs  T(C): 36.8 (29 Aug 2020 06:03), Max: 37.2 (28 Aug 2020 08:43)  T(F): 98.3 (29 Aug 2020 06:03), Max: 99 (28 Aug 2020 08:43)  HR: 72 (29 Aug 2020 06:03) (72 - 85)  BP: 149/90 (29 Aug 2020 06:03) (139/93 - 153/75)  BP(mean): --  RR: 18 (29 Aug 2020 06:03) (18 - 18)  SpO2: 99% (29 Aug 2020 06:03) (98% - 99%)      PHYSICAL EXAM:    GENERAL: NAD  HEENT: NGT in place with gastric output  CHEST/LUNG: Clear to ausculation, bilaterally   HEART: RRR S1S2  ABDOMEN: Well healed scars. non distended, +BS, soft, mild periumbilical tenderness, no guarding  EXTREMITIES:  calf soft, non tender b/l    I&O's Detail    28 Aug 2020 07:01  -  29 Aug 2020 06:35  --------------------------------------------------------  IN:  Total IN: 0 mL    OUT:    Nasoenteral Tube: 500 mL  Total OUT: 500 mL    Total NET: -500 mL          LABS:                        11.8   6.66  )-----------( 202      ( 28 Aug 2020 14:51 )             34.7     08-28    140  |  104  |  8   ----------------------------<  110<H>  3.9   |  27  |  0.79    Ca    9.1      28 Aug 2020 14:51  Phos  2.8     08-28  Mg     2.0     08-28    TPro  7.2  /  Alb  3.4  /  TBili  0.5  /  DBili  x   /  AST  17  /  ALT  14  /  AlkPhos  56  08-28    PT/INR - ( 28 Aug 2020 02:37 )   PT: 13.6 sec;   INR: 1.18 ratio         PTT - ( 28 Aug 2020 02:37 )  PTT:29.1 sec  Urinalysis Basic - ( 28 Aug 2020 05:52 )    Color: Yellow / Appearance: Clear / S.015 / pH: x  Gluc: x / Ketone: Negative  / Bili: Negative / Urobili: Negative mg/dL   Blood: x / Protein: 15 mg/dL / Nitrite: Negative   Leuk Esterase: Small / RBC: 0-2 /HPF / WBC 0-2   Sq Epi: x / Non Sq Epi: Occasional / Bacteria: Occasional        Assessment: 61 y/o female with PMHx breast CA diagnosed in 2017 (s/p bilateral mastectomy and breast reconstruction), abdominal carcinomatosis (s/p chemo/radiation and hysterectomy), HTN, GERD, sleep apnea admitted with malignant SBO.    Plan:  -NPO, NGT to LWS, IVF  -f/u AXR  -malignant SBO protocol (Decadron, Reglan, Octreotide)  -pain management prn  -anti-emetic prn  -GI ppx  -DVT ppx  -f/u labs  -medical co-management  -will discuss with attending.
had bowel movement    Vital Signs Last 24 Hrs  T(C): 37.1 (03 Sep 2020 06:19), Max: 37.1 (03 Sep 2020 06:19)  T(F): 98.7 (03 Sep 2020 06:19), Max: 98.7 (03 Sep 2020 06:19)  HR: 51 (03 Sep 2020 06:19) (51 - 62)  BP: 131/79 (03 Sep 2020 06:19) (131/79 - 147/79)  BP(mean): --  RR: 18 (03 Sep 2020 06:19) (17 - 18)  SpO2: 99% (03 Sep 2020 06:19) (97% - 99%)    PHYSICAL EXAM:    general - AAO x 3  HEENT - No Icterus  CVS - RRR  RS - AE B/L  Abd - soft, NT  Ext - Pulses +        LABS:                        11.0   4.08  )-----------( 217      ( 02 Sep 2020 06:34 )             32.1     09-02    138  |  103  |  16  ----------------------------<  126<H>  3.4<L>   |  28  |  0.67    Ca    8.5      02 Sep 2020 06:34  Phos  3.3     09-02  Mg     2.5     09-02            Culture - Urine (collected 28 Aug 2020 11:06)  Source: .Urine Clean Catch (Midstream)  Final Report (29 Aug 2020 09:12):    <10,000 CFU/mL Normal Urogenital Nelly        RADIOLOGY & ADDITIONAL STUDIES:
lying in bed, feels well    Vital Signs Last 24 Hrs  T(C): 36.7 (02 Sep 2020 06:13), Max: 37.1 (01 Sep 2020 11:02)  T(F): 98.1 (02 Sep 2020 06:13), Max: 98.8 (01 Sep 2020 17:00)  HR: 60 (02 Sep 2020 06:13) (56 - 62)  BP: 167/83 (02 Sep 2020 06:13) (115/80 - 167/94)  BP(mean): --  RR: 18 (02 Sep 2020 06:13) (18 - 19)  SpO2: 97% (02 Sep 2020 06:13) (97% - 99%)    PHYSICAL EXAM:    general - AAO x 3  HEENT - No Icterus  CVS - RRR  RS - AE B/L  Abd - soft, NT  Ext - Pulses +        LABS:                        11.0   4.08  )-----------( 217      ( 02 Sep 2020 06:34 )             32.1     09-02    138  |  103  |  16  ----------------------------<  126<H>  3.4<L>   |  28  |  0.67    Ca    8.5      02 Sep 2020 06:34  Phos  3.3     09-02  Mg     2.5     09-02            Culture - Urine (collected 28 Aug 2020 11:06)  Source: .Urine Clean Catch (Midstream)  Final Report (29 Aug 2020 09:12):    <10,000 CFU/mL Normal Urogenital Nelly        RADIOLOGY & ADDITIONAL STUDIES:
Patient is a 60y old  Female who presents with a chief complaint of Abdominal pain (02 Sep 2020 10:54)      OVERNIGHT EVENTS: none    REVIEW OF SYSTEMS: denies chest pain/SOB, diaphoresis, no F/C, cough, dizziness, headache, blurry vision, nausea, vomiting, abdominal pain. All others review of systems negative     MEDICATIONS  (STANDING):  enoxaparin Injectable 40 milliGRAM(s) SubCutaneous daily  isosorbide   mononitrate ER Tablet (IMDUR) 30 milliGRAM(s) Oral daily  lisinopril 10 milliGRAM(s) Oral daily  nebivolol 5 milliGRAM(s) Oral daily  pantoprazole  Injectable 40 milliGRAM(s) IV Push daily    MEDICATIONS  (PRN):  hydrALAZINE Injectable 10 milliGRAM(s) IV Push every 6 hours PRN SBP > 160      Allergies    No Known Allergies    Intolerances        T(F): 97.9 (09-02-20 @ 11:54), Max: 98.8 (09-01-20 @ 17:00)  HR: 62 (09-02-20 @ 11:54) (56 - 62)  BP: 147/79 (09-02-20 @ 11:54) (144/74 - 167/94)  RR: 18 (09-02-20 @ 11:54) (18 - 19)  SpO2: 98% (09-02-20 @ 11:54) (97% - 99%)  Wt(kg): --    PHYSICAL EXAM:  GENERAL: NAD  HEAD:  Atraumatic, Normocephalic  EYES: PERRLA, conjunctiva and sclera clear  ENMT: Moist mucous membranes  NECK: Supple, No JVD, Normal thyroid  NERVOUS SYSTEM:  Alert & Awake  CHEST/LUNG: Clear to percussion bilaterally;   HEART: Regular rate and rhythm;   ABDOMEN: Soft, Nontender, Nondistended; Bowel sounds present  EXTREMITIES:  no edema BL LE  SKIN: moist    LABS:                        11.0   4.08  )-----------( 217      ( 02 Sep 2020 06:34 )             32.1     09-02    138  |  103  |  16  ----------------------------<  126<H>  3.4<L>   |  28  |  0.67    Ca    8.5      02 Sep 2020 06:34  Phos  3.3     09-02  Mg     2.5     09-02          Cultures;   CAPILLARY BLOOD GLUCOSE        Lipid panel:           RADIOLOGY & ADDITIONAL TESTS:    Imaging Personally Reviewed:  [x ] YES      Consultant(s) Notes Reviewed:  [x ] YES     Care Discussed with [x ] Consultants [X ] Patient [ ] Family  [x ]    [x ]  Other; RN

## 2020-09-03 NOTE — DISCHARGE NOTE NURSING/CASE MANAGEMENT/SOCIAL WORK - PATIENT PORTAL LINK FT
You can access the FollowMyHealth Patient Portal offered by [facility] by registering at the following website: [email or web address]. By joining [facility]’s FollowMyHealth portal, you will also be able to view your health information using other applications (apps) compatible with our system.

## 2020-09-03 NOTE — DIETITIAN INITIAL EVALUATION ADULT. - OTHER INFO
Pt lives c daughter; also has other supportive children who assist her as necessary. Pt denies any N/V/C/D at this time; N/V present PTA now resolved & pt now +BMs; no chew/swallowing difficulty. Wt loss at noted.  Discussed low fiber diet recommendations; appropriate questions asked & answered; RD contact info provided.  Pt to be d/c later today.

## 2020-09-03 NOTE — PROGRESS NOTE ADULT - PROVIDER SPECIALTY LIST ADULT
Heme/Onc
Hospitalist
Surgery
Hospitalist
Surgery
Surgery

## 2020-09-03 NOTE — PROGRESS NOTE ADULT - PROBLEM SELECTOR PLAN 1
- continue surgical care  - advance diet as tolerated  - watch for bowel movements  - physical therapy
- f/u in office next week  - surgical follow up  - supoprtive care
- management per surgery  - DVT prophylaxsis  - advance die as tolerated  - physical therapy
CT abd: Small bowel obstruction likely due to adhesion  Advance to low fiber diet as tolerated  IV analgesics, antiemetic, IV octreotide  Mgmt as per surgery.
CT abd: Small bowel obstruction likely due to adhesion  Advance to low fiber diet as tolerated  s/p IV analgesics, antiemetic, IV octreotide  IV PPI  Mgmt as per surgery.
CT abd: Small bowel obstruction likely due to adhesion  Advance to low fiber diet as tolerated, D/C IVF  IV analgesics, antiemetic, IV octreotide  Mgmt as per surgery.
CT abd: Small bowel obstruction likely due to adhesion  Advance to low fiber diet as tolerated  s/p IV analgesics, antiemetic, IV octreotide  IV PPI  Mgmt as per surgery.

## 2020-09-03 NOTE — PROGRESS NOTE ADULT - PROBLEM/PLAN-3
fall precautions
DISPLAY PLAN FREE TEXT

## 2020-09-03 NOTE — PROGRESS NOTE ADULT - ASSESSMENT
60y Female with PMHx breast CA  (s/p bilateral mastectomy and breast reconstruction), abdominal carcinomatosis (s/p chemo/radiation and hysterectomy), HTN, GERD, sleep apnea admitted with malignant SBO-resolving s/p malignant SBO protocol.
Uterine Cancer
60y Female with PMHx breast CA (s/p bilateral mastectomy and breast reconstruction), abdominal carcinomatosis (s/p chemo/radiation and hysterectomy), HTN, GERD, sleep apnea admitted with malignant SBO-resolving s/p malignant SBO protocol.

## 2020-09-07 ENCOUNTER — INPATIENT (INPATIENT)
Facility: HOSPITAL | Age: 60
LOS: 13 days | Discharge: HOME | End: 2020-09-21
Attending: SURGERY | Admitting: STUDENT IN AN ORGANIZED HEALTH CARE EDUCATION/TRAINING PROGRAM
Payer: COMMERCIAL

## 2020-09-07 VITALS
OXYGEN SATURATION: 100 % | WEIGHT: 173.94 LBS | TEMPERATURE: 98 F | DIASTOLIC BLOOD PRESSURE: 85 MMHG | HEART RATE: 79 BPM | RESPIRATION RATE: 18 BRPM | SYSTOLIC BLOOD PRESSURE: 154 MMHG | HEIGHT: 67 IN

## 2020-09-07 DIAGNOSIS — Z85.3 PERSONAL HISTORY OF MALIGNANT NEOPLASM OF BREAST: ICD-10-CM

## 2020-09-07 DIAGNOSIS — E83.39 OTHER DISORDERS OF PHOSPHORUS METABOLISM: ICD-10-CM

## 2020-09-07 DIAGNOSIS — K56.609 UNSPECIFIED INTESTINAL OBSTRUCTION, UNSPECIFIED AS TO PARTIAL VERSUS COMPLETE OBSTRUCTION: ICD-10-CM

## 2020-09-07 DIAGNOSIS — C78.4 SECONDARY MALIGNANT NEOPLASM OF SMALL INTESTINE: ICD-10-CM

## 2020-09-07 DIAGNOSIS — G47.30 SLEEP APNEA, UNSPECIFIED: ICD-10-CM

## 2020-09-07 DIAGNOSIS — K21.9 GASTRO-ESOPHAGEAL REFLUX DISEASE WITHOUT ESOPHAGITIS: ICD-10-CM

## 2020-09-07 DIAGNOSIS — D75.89 OTHER SPECIFIED DISEASES OF BLOOD AND BLOOD-FORMING ORGANS: ICD-10-CM

## 2020-09-07 DIAGNOSIS — Z90.710 ACQUIRED ABSENCE OF BOTH CERVIX AND UTERUS: Chronic | ICD-10-CM

## 2020-09-07 DIAGNOSIS — Z98.890 OTHER SPECIFIED POSTPROCEDURAL STATES: Chronic | ICD-10-CM

## 2020-09-07 DIAGNOSIS — Z85.42 PERSONAL HISTORY OF MALIGNANT NEOPLASM OF OTHER PARTS OF UTERUS: ICD-10-CM

## 2020-09-07 DIAGNOSIS — Z90.13 ACQUIRED ABSENCE OF BILATERAL BREASTS AND NIPPLES: ICD-10-CM

## 2020-09-07 DIAGNOSIS — Z92.3 PERSONAL HISTORY OF IRRADIATION: ICD-10-CM

## 2020-09-07 DIAGNOSIS — Z90.13 ACQUIRED ABSENCE OF BILATERAL BREASTS AND NIPPLES: Chronic | ICD-10-CM

## 2020-09-07 DIAGNOSIS — C78.6 SECONDARY MALIGNANT NEOPLASM OF RETROPERITONEUM AND PERITONEUM: ICD-10-CM

## 2020-09-07 DIAGNOSIS — K56.50 INTESTINAL ADHESIONS [BANDS], UNSPECIFIED AS TO PARTIAL VERSUS COMPLETE OBSTRUCTION: ICD-10-CM

## 2020-09-07 DIAGNOSIS — Z92.21 PERSONAL HISTORY OF ANTINEOPLASTIC CHEMOTHERAPY: ICD-10-CM

## 2020-09-07 DIAGNOSIS — E87.6 HYPOKALEMIA: ICD-10-CM

## 2020-09-07 DIAGNOSIS — E43 UNSPECIFIED SEVERE PROTEIN-CALORIE MALNUTRITION: ICD-10-CM

## 2020-09-07 DIAGNOSIS — Z90.710 ACQUIRED ABSENCE OF BOTH CERVIX AND UTERUS: ICD-10-CM

## 2020-09-07 DIAGNOSIS — I10 ESSENTIAL (PRIMARY) HYPERTENSION: ICD-10-CM

## 2020-09-07 DIAGNOSIS — S89.90XA UNSPECIFIED INJURY OF UNSPECIFIED LOWER LEG, INITIAL ENCOUNTER: Chronic | ICD-10-CM

## 2020-09-07 LAB
ALBUMIN SERPL ELPH-MCNC: 3.1 G/DL — LOW (ref 3.3–5)
ALP SERPL-CCNC: 60 U/L — SIGNIFICANT CHANGE UP (ref 40–120)
ALT FLD-CCNC: 31 U/L — SIGNIFICANT CHANGE UP (ref 12–78)
ANION GAP SERPL CALC-SCNC: 6 MMOL/L — SIGNIFICANT CHANGE UP (ref 5–17)
APPEARANCE UR: CLEAR — SIGNIFICANT CHANGE UP
AST SERPL-CCNC: 18 U/L — SIGNIFICANT CHANGE UP (ref 15–37)
BASOPHILS # BLD AUTO: 0 K/UL — SIGNIFICANT CHANGE UP (ref 0–0.2)
BASOPHILS NFR BLD AUTO: 0 % — SIGNIFICANT CHANGE UP (ref 0–2)
BILIRUB SERPL-MCNC: 0.4 MG/DL — SIGNIFICANT CHANGE UP (ref 0.2–1.2)
BILIRUB UR-MCNC: NEGATIVE — SIGNIFICANT CHANGE UP
BUN SERPL-MCNC: 11 MG/DL — SIGNIFICANT CHANGE UP (ref 7–23)
CALCIUM SERPL-MCNC: 8.9 MG/DL — SIGNIFICANT CHANGE UP (ref 8.5–10.1)
CHLORIDE SERPL-SCNC: 106 MMOL/L — SIGNIFICANT CHANGE UP (ref 96–108)
CO2 SERPL-SCNC: 31 MMOL/L — SIGNIFICANT CHANGE UP (ref 22–31)
COLOR SPEC: YELLOW — SIGNIFICANT CHANGE UP
CREAT SERPL-MCNC: 0.72 MG/DL — SIGNIFICANT CHANGE UP (ref 0.5–1.3)
DIFF PNL FLD: NEGATIVE — SIGNIFICANT CHANGE UP
EOSINOPHIL # BLD AUTO: 0.02 K/UL — SIGNIFICANT CHANGE UP (ref 0–0.5)
EOSINOPHIL NFR BLD AUTO: 0.4 % — SIGNIFICANT CHANGE UP (ref 0–6)
GLUCOSE SERPL-MCNC: 89 MG/DL — SIGNIFICANT CHANGE UP (ref 70–99)
GLUCOSE UR QL: NEGATIVE MG/DL — SIGNIFICANT CHANGE UP
HCT VFR BLD CALC: 34.8 % — SIGNIFICANT CHANGE UP (ref 34.5–45)
HGB BLD-MCNC: 11.7 G/DL — SIGNIFICANT CHANGE UP (ref 11.5–15.5)
IMM GRANULOCYTES NFR BLD AUTO: 0.2 % — SIGNIFICANT CHANGE UP (ref 0–1.5)
KETONES UR-MCNC: NEGATIVE — SIGNIFICANT CHANGE UP
LACTATE SERPL-SCNC: 0.7 MMOL/L — SIGNIFICANT CHANGE UP (ref 0.7–2)
LEUKOCYTE ESTERASE UR-ACNC: NEGATIVE — SIGNIFICANT CHANGE UP
LIDOCAIN IGE QN: 256 U/L — SIGNIFICANT CHANGE UP (ref 73–393)
LYMPHOCYTES # BLD AUTO: 0.7 K/UL — LOW (ref 1–3.3)
LYMPHOCYTES # BLD AUTO: 12.9 % — LOW (ref 13–44)
MAGNESIUM SERPL-MCNC: 2.5 MG/DL — SIGNIFICANT CHANGE UP (ref 1.6–2.6)
MCHC RBC-ENTMCNC: 33.4 PG — SIGNIFICANT CHANGE UP (ref 27–34)
MCHC RBC-ENTMCNC: 33.6 GM/DL — SIGNIFICANT CHANGE UP (ref 32–36)
MCV RBC AUTO: 99.4 FL — SIGNIFICANT CHANGE UP (ref 80–100)
MONOCYTES # BLD AUTO: 0.62 K/UL — SIGNIFICANT CHANGE UP (ref 0–0.9)
MONOCYTES NFR BLD AUTO: 11.4 % — SIGNIFICANT CHANGE UP (ref 2–14)
NEUTROPHILS # BLD AUTO: 4.09 K/UL — SIGNIFICANT CHANGE UP (ref 1.8–7.4)
NEUTROPHILS NFR BLD AUTO: 75.1 % — SIGNIFICANT CHANGE UP (ref 43–77)
NITRITE UR-MCNC: NEGATIVE — SIGNIFICANT CHANGE UP
NRBC # BLD: 0 /100 WBCS — SIGNIFICANT CHANGE UP (ref 0–0)
PH UR: 7 — SIGNIFICANT CHANGE UP (ref 5–8)
PLATELET # BLD AUTO: 180 K/UL — SIGNIFICANT CHANGE UP (ref 150–400)
POTASSIUM SERPL-MCNC: 3.5 MMOL/L — SIGNIFICANT CHANGE UP (ref 3.5–5.3)
POTASSIUM SERPL-SCNC: 3.5 MMOL/L — SIGNIFICANT CHANGE UP (ref 3.5–5.3)
PROT SERPL-MCNC: 7.1 GM/DL — SIGNIFICANT CHANGE UP (ref 6–8.3)
PROT UR-MCNC: NEGATIVE MG/DL — SIGNIFICANT CHANGE UP
RBC # BLD: 3.5 M/UL — LOW (ref 3.8–5.2)
RBC # FLD: 14.3 % — SIGNIFICANT CHANGE UP (ref 10.3–14.5)
SARS-COV-2 RNA SPEC QL NAA+PROBE: SIGNIFICANT CHANGE UP
SODIUM SERPL-SCNC: 143 MMOL/L — SIGNIFICANT CHANGE UP (ref 135–145)
SP GR SPEC: 1 — LOW (ref 1.01–1.02)
UROBILINOGEN FLD QL: 1 MG/DL
WBC # BLD: 5.44 K/UL — SIGNIFICANT CHANGE UP (ref 3.8–10.5)
WBC # FLD AUTO: 5.44 K/UL — SIGNIFICANT CHANGE UP (ref 3.8–10.5)

## 2020-09-07 PROCEDURE — 99223 1ST HOSP IP/OBS HIGH 75: CPT

## 2020-09-07 PROCEDURE — 99222 1ST HOSP IP/OBS MODERATE 55: CPT

## 2020-09-07 PROCEDURE — 74018 RADEX ABDOMEN 1 VIEW: CPT | Mod: 26,59

## 2020-09-07 PROCEDURE — 99285 EMERGENCY DEPT VISIT HI MDM: CPT

## 2020-09-07 PROCEDURE — 74177 CT ABD & PELVIS W/CONTRAST: CPT | Mod: 26

## 2020-09-07 RX ORDER — ONDANSETRON 8 MG/1
4 TABLET, FILM COATED ORAL ONCE
Refills: 0 | Status: COMPLETED | OUTPATIENT
Start: 2020-09-07 | End: 2020-09-07

## 2020-09-07 RX ORDER — HYDROMORPHONE HYDROCHLORIDE 2 MG/ML
1 INJECTION INTRAMUSCULAR; INTRAVENOUS; SUBCUTANEOUS ONCE
Refills: 0 | Status: DISCONTINUED | OUTPATIENT
Start: 2020-09-07 | End: 2020-09-07

## 2020-09-07 RX ORDER — ONDANSETRON 8 MG/1
4 TABLET, FILM COATED ORAL EVERY 8 HOURS
Refills: 0 | Status: DISCONTINUED | OUTPATIENT
Start: 2020-09-07 | End: 2020-09-10

## 2020-09-07 RX ORDER — HEPARIN SODIUM 5000 [USP'U]/ML
5000 INJECTION INTRAVENOUS; SUBCUTANEOUS EVERY 8 HOURS
Refills: 0 | Status: DISCONTINUED | OUTPATIENT
Start: 2020-09-07 | End: 2020-09-10

## 2020-09-07 RX ORDER — SODIUM CHLORIDE 9 MG/ML
1000 INJECTION INTRAMUSCULAR; INTRAVENOUS; SUBCUTANEOUS ONCE
Refills: 0 | Status: COMPLETED | OUTPATIENT
Start: 2020-09-07 | End: 2020-09-07

## 2020-09-07 RX ORDER — PANTOPRAZOLE SODIUM 20 MG/1
40 TABLET, DELAYED RELEASE ORAL DAILY
Refills: 0 | Status: DISCONTINUED | OUTPATIENT
Start: 2020-09-07 | End: 2020-09-10

## 2020-09-07 RX ORDER — LISINOPRIL 2.5 MG/1
1 TABLET ORAL
Qty: 0 | Refills: 0 | DISCHARGE

## 2020-09-07 RX ORDER — METOCLOPRAMIDE HCL 10 MG
10 TABLET ORAL EVERY 8 HOURS
Refills: 0 | Status: COMPLETED | OUTPATIENT
Start: 2020-09-07 | End: 2020-09-09

## 2020-09-07 RX ORDER — POTASSIUM CHLORIDE 20 MEQ
10 PACKET (EA) ORAL DAILY
Refills: 0 | Status: DISCONTINUED | OUTPATIENT
Start: 2020-09-07 | End: 2020-09-07

## 2020-09-07 RX ORDER — DEXTROSE MONOHYDRATE, SODIUM CHLORIDE, AND POTASSIUM CHLORIDE 50; .745; 4.5 G/1000ML; G/1000ML; G/1000ML
1000 INJECTION, SOLUTION INTRAVENOUS
Refills: 0 | Status: DISCONTINUED | OUTPATIENT
Start: 2020-09-07 | End: 2020-09-10

## 2020-09-07 RX ORDER — LISINOPRIL 2.5 MG/1
10 TABLET ORAL DAILY
Refills: 0 | Status: DISCONTINUED | OUTPATIENT
Start: 2020-09-07 | End: 2020-09-07

## 2020-09-07 RX ORDER — POTASSIUM CHLORIDE 20 MEQ
10 PACKET (EA) ORAL DAILY
Refills: 0 | Status: COMPLETED | OUTPATIENT
Start: 2020-09-07 | End: 2020-09-09

## 2020-09-07 RX ORDER — OCTREOTIDE ACETATE 200 UG/ML
25 INJECTION, SOLUTION INTRAVENOUS; SUBCUTANEOUS
Qty: 500 | Refills: 0 | Status: DISCONTINUED | OUTPATIENT
Start: 2020-09-07 | End: 2020-09-10

## 2020-09-07 RX ORDER — DEXAMETHASONE 0.5 MG/5ML
10 ELIXIR ORAL EVERY 8 HOURS
Refills: 0 | Status: COMPLETED | OUTPATIENT
Start: 2020-09-07 | End: 2020-09-09

## 2020-09-07 RX ORDER — ACETAMINOPHEN 500 MG
1000 TABLET ORAL ONCE
Refills: 0 | Status: COMPLETED | OUTPATIENT
Start: 2020-09-07 | End: 2020-09-07

## 2020-09-07 RX ADMIN — SODIUM CHLORIDE 1000 MILLILITER(S): 9 INJECTION INTRAMUSCULAR; INTRAVENOUS; SUBCUTANEOUS at 11:52

## 2020-09-07 RX ADMIN — SODIUM CHLORIDE 1000 MILLILITER(S): 9 INJECTION INTRAMUSCULAR; INTRAVENOUS; SUBCUTANEOUS at 14:00

## 2020-09-07 RX ADMIN — Medication 1000 MILLIGRAM(S): at 22:50

## 2020-09-07 RX ADMIN — HYDROMORPHONE HYDROCHLORIDE 1 MILLIGRAM(S): 2 INJECTION INTRAMUSCULAR; INTRAVENOUS; SUBCUTANEOUS at 12:35

## 2020-09-07 RX ADMIN — HYDROMORPHONE HYDROCHLORIDE 1 MILLIGRAM(S): 2 INJECTION INTRAMUSCULAR; INTRAVENOUS; SUBCUTANEOUS at 14:00

## 2020-09-07 RX ADMIN — ONDANSETRON 4 MILLIGRAM(S): 8 TABLET, FILM COATED ORAL at 11:49

## 2020-09-07 RX ADMIN — Medication 10 MILLIGRAM(S): at 21:55

## 2020-09-07 RX ADMIN — HYDROMORPHONE HYDROCHLORIDE 1 MILLIGRAM(S): 2 INJECTION INTRAMUSCULAR; INTRAVENOUS; SUBCUTANEOUS at 11:50

## 2020-09-07 RX ADMIN — DEXTROSE MONOHYDRATE, SODIUM CHLORIDE, AND POTASSIUM CHLORIDE 110 MILLILITER(S): 50; .745; 4.5 INJECTION, SOLUTION INTRAVENOUS at 15:50

## 2020-09-07 RX ADMIN — Medication 400 MILLIGRAM(S): at 22:32

## 2020-09-07 RX ADMIN — Medication 102 MILLIGRAM(S): at 21:55

## 2020-09-07 RX ADMIN — ONDANSETRON 4 MILLIGRAM(S): 8 TABLET, FILM COATED ORAL at 14:00

## 2020-09-07 RX ADMIN — HEPARIN SODIUM 5000 UNIT(S): 5000 INJECTION INTRAVENOUS; SUBCUTANEOUS at 21:55

## 2020-09-07 RX ADMIN — OCTREOTIDE ACETATE 5 MICROGRAM(S)/HR: 200 INJECTION, SOLUTION INTRAVENOUS; SUBCUTANEOUS at 22:26

## 2020-09-07 NOTE — ED ADULT NURSE REASSESSMENT NOTE - NS ED NURSE REASSESS COMMENT FT1
1135 received pt bed A for lunch relief from Petra Slater at bedside putting in IV under sono guidance labs obtained by  and sent Pt medicated and awaiting transport

## 2020-09-07 NOTE — H&P ADULT - PROBLEM SELECTOR PLAN 3
s/p bilateral mastectomy and breast reconstruction, abdominal carcinomatosis s/p chemo/radiation and hysterectomy  will consult heme/onc Dr Vital

## 2020-09-07 NOTE — ED ADULT NURSE NOTE - OBJECTIVE STATEMENT
61 Y/O female with c/c of generalized consistent, stabbing, abdominal pain for the past week that radiates to the lower back, 2 episodes of vomiting and

## 2020-09-07 NOTE — H&P ADULT - HISTORY OF PRESENT ILLNESS
61 y/o female with PMHx breast CA diagnosed in 2017 (s/p bilateral mastectomy and breast reconstruction), abdominal carcinomatosis (s/p chemo/radiation 12/19 - 4/20 and hysterectomy 10/2019), HTN, GERD, sleep apnea   now presents to the ED accompanied by her daughter c/o worsening abdominal pain of two days duration, described as severe, 12/10 pain, crampy, nonradiating periumbilical, and worse at night/with lying down. She is known to service and was recently discharged last week following conservative tx for SBO. She reports she vomited twice yesterday, NBNB. Pt admits she used miralax and an enema and had a BM this am. Pain is similar to last week. No nausea/vomiting at present. Patient denies fever, chills, diarrhea, melena, hematochezia, dysuria, hematuria, chest pain, shortness of breath, dizziness, cough. She also complains of back pain; ambulates independently at home.

## 2020-09-07 NOTE — H&P ADULT - PROBLEM SELECTOR PLAN 1
-Admit patient   -NPO, NGT to LWS, IVF  -KUB to confirm NGT placement  -pain management prn  -anti-emetic prn  -serial abdominal exams and close monitoring  discussed with Dr Beltran

## 2020-09-07 NOTE — ED PROVIDER NOTE - OBJECTIVE STATEMENT
60y Female with PMHx breast CA (s/p bilateral mastectomy and breast reconstruction), abdominal carcinomatosis (s/p chemo/radiation and hysterectomy), HTN, GERD, sleep apnea 60y Female with PMHx breast CA (s/p bilateral mastectomy and breast reconstruction), abdominal carcinomatosis (s/p chemo/radiation and hysterectomy), HTN, GERD, sleep apnea pw diffuse abd pain and distension. recently admitted 8/28 for the same kind of pain, found to be malignant obstruction relieved with ng tube. no surgery was done. patient notes she is also markedly constipated and has to regularly give herself enemeas to have bms. had one yesterday. ros neg for ha, vision loss, cp, sob, fever, chills, cough, bleeding, numbness, dysuria, rhinorrhea,

## 2020-09-07 NOTE — H&P ADULT - NSHPPHYSICALEXAM_GEN_ALL_CORE
PHYSICAL EXAM:  GENERAL: NAD, well-developed  HEAD:  Atraumatic, Normocephalic  EYES: Conjunctiva and sclera clear  ENMT: No tonsillar erythema, exudates, or enlargement; Moist mucous membranes, No lesions  NECK: Supple, No JVD, Normal thyroid  NERVOUS SYSTEM:  Alert & Oriented X3  CHEST/LUNG: Clear to auscultation bilaterally; No rales, rhonchi, wheezing  HEART: Regular rate and rhythm. S1S2  ABDOMEN: Well-healed midline surgical scar and other abdominal scars from patient's prior procedures. Soft with mild distention, and generalized mild tenderness to palpation, no guarding, no rigidity. Hypoactive bowel sounds  EXTREMITIES:  2+ Peripheral Pulses, No clubbing, cyanosis, or edema

## 2020-09-07 NOTE — CONSULT NOTE ADULT - ASSESSMENT
59 y/o female with PMHx breast CA diagnosed in 2017 (s/p bilateral mastectomy and breast reconstruction), abdominal carcinomatosis (s/p chemo/radiation 12/19 - 4/20 and hysterectomy 10/2019), HTN, GERD, sleep apnea,  now presents to the ED accompanied by her daughter c/o worsening abdominal pain of two days duration, described as severe, 12/10 pain, crampy, nonradiating periumbilical, and worse at night/with lying down. She is known to service and was recently discharged last week following conservative tx for SBO. She reports she vomited twice yesterday, NBNB. Pt admits she used miralax and an enema and had a BM this am. Pain is similar to last week. No nausea/vomiting at present. Patient denies fever, chills, diarrhea, melena, hematochezia, dysuria, hematuria, chest pain, shortness of breath, dizziness, cough. She also complains of back pain; ambulates independently at home. (07 Sep 2020 14:59) 59 yo female with PMHx breast CA diagnosed in 2017 (s/p bilateral mastectomy and breast reconstruction), abdominal carcinomatosis (s/p chemo/radiation 12/19 - 4/20 and hysterectomy 10/2019), HTN, GERD, sleep apnea p/w recurrent SBO.    SBO  - CT showed a small bowel obstruction in the midabdomen  - management as per surgery - NGT w/ NPO, anti-emetic & pain meds prn    HTN  - switch PO lisinopril to IV enalaprilat while patient is NPO    GERD  - c/w IV Protonix

## 2020-09-07 NOTE — ED PROVIDER NOTE - CARE PLAN
Problem: Non-Pressure Injury Wound  Goal: # No deterioration in wound  Outcome: Outcome Met, Continue evaluating goal progress toward completion  Seen in wound clinic for provider appointment , wounds appear stable, no sign/symptoms of infection. Therapy plan to include: iodoflex to open areas, gauze and tape. Patient aware to contact wound clinic with any questions or concerns. Follow up in 2 weeks.        
Principal Discharge DX:	SBO (small bowel obstruction)

## 2020-09-07 NOTE — ED PROVIDER NOTE - CLINICAL SUMMARY MEDICAL DECISION MAKING FREE TEXT BOX
abd pain and tenderness. concern for abd carcinomatosis abd pain and tenderness. concern for abd carcinomatosis  ct shows obstruction. ng, admit for surgery

## 2020-09-07 NOTE — CONSULT NOTE ADULT - SUBJECTIVE AND OBJECTIVE BOX
HPI:  61 y/o female with PMHx breast CA diagnosed in 2017 (s/p bilateral mastectomy and breast reconstruction), abdominal carcinomatosis (s/p chemo/radiation  -  and hysterectomy 10/2019), HTN, GERD, sleep apnea   now presents to the ED accompanied by her daughter c/o worsening abdominal pain of two days duration, described as severe, 12/10 pain, crampy, nonradiating periumbilical, and worse at night/with lying down. She is known to service and was recently discharged last week following conservative tx for SBO. She reports she vomited twice yesterday, NBNB. Pt admits she used miralax and an enema and had a BM this am. Pain is similar to last week. No nausea/vomiting at present. Patient denies fever, chills, diarrhea, melena, hematochezia, dysuria, hematuria, chest pain, shortness of breath, dizziness, cough. She also complains of back pain; ambulates independently at home. (07 Sep 2020 14:59)      PAST MEDICAL & SURGICAL HISTORY:  Essential hypertension  Cancer of breast: Rt. Female  Sleep apnea: Use Oral Device  Obesity (BMI 30.0-34.9)  Seasonal allergies  Acid reflux  S/P hysterectomy  S/P breast reconstruction, bilateral  S/P bilateral mastectomy  Knee injury: Left &amp; had surgery about 30 years ago      REVIEW OF SYSTEMS:    CONSTITUTIONAL: No fever, weight loss, or fatigue  EYES: No eye pain, visual disturbances, or discharge  ENMT:  No difficulty hearing, tinnitus, vertigo; No sinus or throat pain  NECK: No pain or stiffness  BREASTS: No pain, masses, or nipple discharge  RESPIRATORY: No cough, wheezing, chills or hemoptysis; No shortness of breath  CARDIOVASCULAR: No chest pain, palpitations, dizziness, or leg swelling  GASTROINTESTINAL: No abdominal or epigastric pain. No nausea, vomiting, or hematemesis; No diarrhea or constipation. No melena or hematochezia.  GENITOURINARY: No dysuria, frequency, hematuria, or incontinence  NEUROLOGICAL: No headaches, memory loss, loss of strength, numbness, or tremors  SKIN: No itching, burning, rashes, or lesions   LYMPH NODES: No enlarged glands  ENDOCRINE: No heat or cold intolerance; No hair loss  MUSCULOSKELETAL: No joint pain or swelling; No muscle, back, or extremity pain  PSYCHIATRIC: No depression, anxiety, mood swings, or difficulty sleeping  HEME/LYMPH: No easy bruising, or bleeding gums  ALLERGY AND IMMUNOLOGIC: No hives or eczema      MEDICATIONS  (STANDING):  acetaminophen  IVPB .. 1000 milliGRAM(s) IV Intermittent once  dextrose 5% + sodium chloride 0.45% with potassium chloride 20 mEq/L 1000 milliLiter(s) (110 mL/Hr) IV Continuous <Continuous>  heparin   Injectable 5000 Unit(s) SubCutaneous every 8 hours  lisinopril 10 milliGRAM(s) Oral daily  pantoprazole  Injectable 40 milliGRAM(s) IV Push daily  potassium chloride    Tablet ER 10 milliEquivalent(s) Oral daily    MEDICATIONS  (PRN):  ondansetron Injectable 4 milliGRAM(s) IV Push every 8 hours PRN Nausea and/or Vomiting      Allergies    No Known Allergies    Intolerances        SOCIAL HISTORY:    FAMILY HISTORY:      Vital Signs Last 24 Hrs  T(C): 37.1 (07 Sep 2020 11:58), Max: 37.1 (07 Sep 2020 11:58)  T(F): 98.7 (07 Sep 2020 11:58), Max: 98.7 (07 Sep 2020 11:58)  HR: 67 (07 Sep 2020 11:58) (67 - 79)  BP: 139/76 (07 Sep 2020 11:58) (139/76 - 154/85)  BP(mean): --  RR: 18 (07 Sep 2020 11:58) (18 - 18)  SpO2: 99% (07 Sep 2020 11:58) (99% - 100%)    PHYSICAL EXAM:    GENERAL: NAD, well-groomed, well-developed  HEAD:  Atraumatic, Normocephalic  EYES: EOMI, PERRLA, conjunctiva and sclera clear  ENMT: No tonsillar erythema, exudates, or enlargement; Moist mucous membranes, Good dentition, No lesions  NECK: Supple, No JVD, Normal thyroid  NERVOUS SYSTEM:  Alert & Oriented X3, Good concentration; Motor Strength 5/5 B/L upper and lower extremities; DTRs 2+ intact and symmetric  CHEST/LUNG: Clear to percussion bilaterally; No rales, rhonchi, wheezing, or rubs  HEART: Regular rate and rhythm; No murmurs, rubs, or gallops  ABDOMEN: Soft, Nontender, Nondistended; Bowel sounds present  EXTREMITIES:  2+ Peripheral Pulses, No clubbing, cyanosis, or edema  LYMPH: No lymphadenopathy notedRECTAL: No masses, prostate normal size and smooth, Guiac negative   BREAST: No palpatble masses, skin no lesions no retractions, no discharages. adenexa no palpable masses noted   GYN: uterus normal size, adenexa no palpable masses, no CMT, no uterine discharge   SKIN: No rashes or lesions      LABS:                        11.7   5.44  )-----------( 180      ( 07 Sep 2020 11:50 )             34.8     -    143  |  106  |  11  ----------------------------<  89  3.5   |  31  |  0.72    Ca    8.9      07 Sep 2020 11:50  Mg     2.5         TPro  7.1  /  Alb  3.1<L>  /  TBili  0.4  /  DBili  x   /  AST  18  /  ALT  31  /  AlkPhos  60  09-07      Urinalysis Basic - ( 07 Sep 2020 13:30 )    Color: Yellow / Appearance: Clear / S.005 / pH: x  Gluc: x / Ketone: Negative  / Bili: Negative / Urobili: 1 mg/dL   Blood: x / Protein: Negative mg/dL / Nitrite: Negative   Leuk Esterase: Negative / RBC: x / WBC x   Sq Epi: x / Non Sq Epi: x / Bacteria: x        RADIOLOGY & ADDITIONAL STUDIES: 59 yo female with PMHx breast CA diagnosed in 2017 (s/p bilateral mastectomy and breast reconstruction), abdominal carcinomatosis (s/p chemo/radiation  -  and hysterectomy 10/2019), HTN, GERD, sleep apnea p/w recurrent mid abd 10/10 pain, nausea, vomiting and constipation x 2 days. Pt was discharged last week following conservative tx for SBO but sates that her symptoms recurred shortly after discharge.       PAST MEDICAL & SURGICAL HISTORY:  Essential hypertension  Cancer of breast: Rt. Female  Sleep apnea: Use Oral Device  Obesity (BMI 30.0-34.9)  Seasonal allergies  Acid reflux  S/P hysterectomy  S/P breast reconstruction, bilateral  S/P bilateral mastectomy  Knee injury: Left &amp; had surgery about 30 years ago      REVIEW OF SYSTEMS:    CONSTITUTIONAL: No fever, weight loss, or fatigue  EYES: No eye pain, visual disturbances, or discharge  ENMT:  No difficulty hearing, tinnitus, vertigo; No sinus or throat pain  NECK: No pain or stiffness  BREASTS: No pain, masses, or nipple discharge  RESPIRATORY: No cough, wheezing, chills or hemoptysis; No shortness of breath  CARDIOVASCULAR: No chest pain, palpitations, dizziness, or leg swelling  GASTROINTESTINAL: abdominal pain, nausea, vomiting, & constipation   GENITOURINARY: No dysuria, frequency, hematuria, or incontinence  NEUROLOGICAL: No headaches, memory loss, loss of strength, numbness, or tremors  SKIN: No itching, burning, rashes, or lesions   LYMPH NODES: No enlarged glands  ENDOCRINE: No heat or cold intolerance; No hair loss  MUSCULOSKELETAL: No joint pain or swelling; No muscle, back, or extremity pain  PSYCHIATRIC: No depression, anxiety, mood swings, or difficulty sleeping  HEME/LYMPH: No easy bruising, or bleeding gums  ALLERGY AND IMMUNOLOGIC: No hives or eczema      MEDICATIONS  (STANDING):  acetaminophen  IVPB .. 1000 milliGRAM(s) IV Intermittent once  dextrose 5% + sodium chloride 0.45% with potassium chloride 20 mEq/L 1000 milliLiter(s) (110 mL/Hr) IV Continuous <Continuous>  heparin   Injectable 5000 Unit(s) SubCutaneous every 8 hours  lisinopril 10 milliGRAM(s) Oral daily  pantoprazole  Injectable 40 milliGRAM(s) IV Push daily  potassium chloride    Tablet ER 10 milliEquivalent(s) Oral daily    MEDICATIONS  (PRN):  ondansetron Injectable 4 milliGRAM(s) IV Push every 8 hours PRN Nausea and/or Vomiting      Allergies    No Known Allergies    Intolerances        SOCIAL HISTORY:    patient denied any alcohol or tobacco use    FAMILY HISTORY:  patient denied any relevant PMH    Vital Signs Last 24 Hrs  T(C): 37.1 (07 Sep 2020 11:58), Max: 37.1 (07 Sep 2020 11:58)  T(F): 98.7 (07 Sep 2020 11:58), Max: 98.7 (07 Sep 2020 11:58)  HR: 67 (07 Sep 2020 11:58) (67 - 79)  BP: 139/76 (07 Sep 2020 11:58) (139/76 - 154/85)  BP(mean): --  RR: 18 (07 Sep 2020 11:58) (18 - 18)  SpO2: 99% (07 Sep 2020 11:58) (99% - 100%)    PHYSICAL EXAM:    GENERAL: NAD, well-groomed, well-developed  HEAD:  Atraumatic, Normocephalic  EYES: EOMI, PERRLA   ENMT: NGT in place  NECK: Supple   NERVOUS SYSTEM:  Alert & Oriented X3, Good concentration   CHEST/LUNG: Clear to percussion bilaterally; No rales, rhonchi, wheezing, or rubs  HEART: Regular rate and rhythm; No murmurs, rubs, or gallops  ABDOMEN: Soft, Nontender, bowel sounds absent   EXTREMITIES: No clubbing, cyanosis, or edema       LABS:                        11.7   5.44  )-----------( 180      ( 07 Sep 2020 11:50 )             34.8         143  |  106  |  11  ----------------------------<  89  3.5   |  31  |  0.72    Ca    8.9      07 Sep 2020 11:50  Mg     2.5         TPro  7.1  /  Alb  3.1<L>  /  TBili  0.4  /  DBili  x   /  AST  18  /  ALT  31  /  AlkPhos  60        Urinalysis Basic - ( 07 Sep 2020 13:30 )    Color: Yellow / Appearance: Clear / S.005 / pH: x  Gluc: x / Ketone: Negative  / Bili: Negative / Urobili: 1 mg/dL   Blood: x / Protein: Negative mg/dL / Nitrite: Negative   Leuk Esterase: Negative / RBC: x / WBC x   Sq Epi: x / Non Sq Epi: x / Bacteria: x        RADIOLOGY & ADDITIONAL STUDIES:

## 2020-09-07 NOTE — ED PROVIDER NOTE - PHYSICAL EXAMINATION
Gen: Alert, NAD  Head: NC, AT   Eyes: PERRL, EOMI, normal lids/conjunctiva  ENT: normal hearing, patent oropharynx without erythema/exudate, uvula midline  Neck: supple, no tenderness, Trachea midline  Pulm: Bilateral BS, normal resp effort, no wheeze/stridor/retractions  CV: RRR, no M/R/G, 2+ radial and dp pulses bl, no edema  Abd: soft, distended, diffusely tender   Mskel: extremities x4 with normal ROM and no joint effusions. no ctl spine ttp.   Skin: no rash, no bruising   Neuro: AAOx3, no sensory/motor deficits, CN 2-12 intact

## 2020-09-07 NOTE — ED ADULT TRIAGE NOTE - CHIEF COMPLAINT QUOTE
patient c/o of abdominal pain radiating in the back started 1 week ago , c/o of N/V  denied diarrhea no constipation , denied chest pain , denied difficulty breathing history of breast CA

## 2020-09-07 NOTE — H&P ADULT - ASSESSMENT
59 y/o female with PMHx breast CA diagnosed in 2017 (s/p bilateral mastectomy and breast reconstruction), abdominal carcinomatosis (s/p chemo/radiation 12/19 - 4/20 and hysterectomy 10/2019), HTN, GERD, sleep apnea admitted with SBO

## 2020-09-07 NOTE — H&P ADULT - NSHPLABSRESULTS_GEN_ALL_CORE
< from: CT Abdomen and Pelvis w/ IV Cont (09.07.20 @ 12:25) >    BOWEL: Mild to moderate distention of the proximal and mid small bowel. Distal small bowel however is collapsed and a transition point is at the level of mid abdomen (2:63). This is at the same level as noted on the prior study. Appendix is normal.  PERITONEUM: Small volume ascites. Possible peritoneal implants in the left upper quadrant. Ascites versus peritoneal implant along the fundus of the stomach. Small interloop collections in the lower abdomen and pelvis. Mild mesenteric congestion related to the dilated small bowel loops.  VESSELS: Mild to moderate stenosis at the origin of celiac axis.  RETROPERITONEUM/LYMPH NODES: No lymphadenopathy.  ABDOMINAL WALL: Postsurgical changes.  BONES: Large disc herniation at L4-5 level with secondary central canal stenosis.    IMPRESSION:  Small bowel obstruction in the midabdomen. No evidence of bowel ischemia, perforation or any drainable collection.    Small volume ascites and peritoneal implants in the left upper quadrant and possibly along the fundus of the stomach.    Large disc herniation and central canal stenosis L4-5 level.    < end of copied text >

## 2020-09-08 DIAGNOSIS — C55 MALIGNANT NEOPLASM OF UTERUS, PART UNSPECIFIED: ICD-10-CM

## 2020-09-08 LAB
ANION GAP SERPL CALC-SCNC: 7 MMOL/L — SIGNIFICANT CHANGE UP (ref 5–17)
BUN SERPL-MCNC: 6 MG/DL — LOW (ref 7–23)
CALCIUM SERPL-MCNC: 8.9 MG/DL — SIGNIFICANT CHANGE UP (ref 8.5–10.1)
CHLORIDE SERPL-SCNC: 102 MMOL/L — SIGNIFICANT CHANGE UP (ref 96–108)
CO2 SERPL-SCNC: 29 MMOL/L — SIGNIFICANT CHANGE UP (ref 22–31)
CREAT SERPL-MCNC: 0.67 MG/DL — SIGNIFICANT CHANGE UP (ref 0.5–1.3)
GLUCOSE SERPL-MCNC: 158 MG/DL — HIGH (ref 70–99)
HCT VFR BLD CALC: 34.4 % — LOW (ref 34.5–45)
HGB BLD-MCNC: 11.5 G/DL — SIGNIFICANT CHANGE UP (ref 11.5–15.5)
MAGNESIUM SERPL-MCNC: 2.5 MG/DL — SIGNIFICANT CHANGE UP (ref 1.6–2.6)
MCHC RBC-ENTMCNC: 33.4 GM/DL — SIGNIFICANT CHANGE UP (ref 32–36)
MCHC RBC-ENTMCNC: 33.6 PG — SIGNIFICANT CHANGE UP (ref 27–34)
MCV RBC AUTO: 100.6 FL — HIGH (ref 80–100)
NRBC # BLD: 0 /100 WBCS — SIGNIFICANT CHANGE UP (ref 0–0)
PHOSPHATE SERPL-MCNC: 2.7 MG/DL — SIGNIFICANT CHANGE UP (ref 2.5–4.5)
PLATELET # BLD AUTO: 155 K/UL — SIGNIFICANT CHANGE UP (ref 150–400)
POTASSIUM SERPL-MCNC: 4.2 MMOL/L — SIGNIFICANT CHANGE UP (ref 3.5–5.3)
POTASSIUM SERPL-SCNC: 4.2 MMOL/L — SIGNIFICANT CHANGE UP (ref 3.5–5.3)
RBC # BLD: 3.42 M/UL — LOW (ref 3.8–5.2)
RBC # FLD: 14.3 % — SIGNIFICANT CHANGE UP (ref 10.3–14.5)
SARS-COV-2 IGG SERPL QL IA: POSITIVE
SARS-COV-2 IGM SERPL IA-ACNC: 4.76 RATIO — HIGH
SODIUM SERPL-SCNC: 138 MMOL/L — SIGNIFICANT CHANGE UP (ref 135–145)
WBC # BLD: 5.16 K/UL — SIGNIFICANT CHANGE UP (ref 3.8–10.5)
WBC # FLD AUTO: 5.16 K/UL — SIGNIFICANT CHANGE UP (ref 3.8–10.5)

## 2020-09-08 PROCEDURE — 99232 SBSQ HOSP IP/OBS MODERATE 35: CPT

## 2020-09-08 RX ADMIN — Medication 1.25 MILLIGRAM(S): at 17:03

## 2020-09-08 RX ADMIN — Medication 10 MILLIGRAM(S): at 13:47

## 2020-09-08 RX ADMIN — Medication 10 MILLIGRAM(S): at 21:07

## 2020-09-08 RX ADMIN — DEXTROSE MONOHYDRATE, SODIUM CHLORIDE, AND POTASSIUM CHLORIDE 110 MILLILITER(S): 50; .745; 4.5 INJECTION, SOLUTION INTRAVENOUS at 21:07

## 2020-09-08 RX ADMIN — Medication 1.25 MILLIGRAM(S): at 00:12

## 2020-09-08 RX ADMIN — HEPARIN SODIUM 5000 UNIT(S): 5000 INJECTION INTRAVENOUS; SUBCUTANEOUS at 21:07

## 2020-09-08 RX ADMIN — DEXTROSE MONOHYDRATE, SODIUM CHLORIDE, AND POTASSIUM CHLORIDE 110 MILLILITER(S): 50; .745; 4.5 INJECTION, SOLUTION INTRAVENOUS at 11:57

## 2020-09-08 RX ADMIN — HEPARIN SODIUM 5000 UNIT(S): 5000 INJECTION INTRAVENOUS; SUBCUTANEOUS at 05:27

## 2020-09-08 RX ADMIN — Medication 1.25 MILLIGRAM(S): at 05:27

## 2020-09-08 RX ADMIN — Medication 10 MILLIEQUIVALENT(S): at 11:55

## 2020-09-08 RX ADMIN — Medication 1.25 MILLIGRAM(S): at 23:30

## 2020-09-08 RX ADMIN — HEPARIN SODIUM 5000 UNIT(S): 5000 INJECTION INTRAVENOUS; SUBCUTANEOUS at 13:48

## 2020-09-08 RX ADMIN — Medication 1.25 MILLIGRAM(S): at 11:55

## 2020-09-08 RX ADMIN — Medication 102 MILLIGRAM(S): at 14:15

## 2020-09-08 RX ADMIN — Medication 102 MILLIGRAM(S): at 21:50

## 2020-09-08 RX ADMIN — Medication 10 MILLIGRAM(S): at 05:27

## 2020-09-08 RX ADMIN — PANTOPRAZOLE SODIUM 40 MILLIGRAM(S): 20 TABLET, DELAYED RELEASE ORAL at 11:55

## 2020-09-08 RX ADMIN — Medication 102 MILLIGRAM(S): at 05:27

## 2020-09-08 RX ADMIN — OCTREOTIDE ACETATE 5 MICROGRAM(S)/HR: 200 INJECTION, SOLUTION INTRAVENOUS; SUBCUTANEOUS at 13:53

## 2020-09-08 NOTE — PROGRESS NOTE ADULT - SUBJECTIVE AND OBJECTIVE BOX
CHIEF COMPLAINT: Follow up of SBO and abd discomfort  NGT intact  passing gas x 2 today.   No nausea or vomiting   no sob or chest pain       PHYSICAL EXAM:    GENERAL: well built, well nourished  CHEST/LUNG: Clear to ausculation bilaterally, no wheezing, no crackles   HEART: Regular rate and rhythm; No murmurs, rubs  ABDOMEN: Distended and + BS. non tender   EXTREMITIES:  Moving all four extremities spontaneously, No clubbing, cyanosis, or edema  NERVOUS SYSTEM:  Grossly non focal.  Psychiatry: AAO x 3, mood is appropriate       OBJECTIVE DATA:   Vital Signs Last 24 Hrs  T(C): 36.4 (08 Sep 2020 11:13), Max: 36.4 (07 Sep 2020 16:30)  T(F): 97.5 (08 Sep 2020 11:13), Max: 97.6 (07 Sep 2020 23:09)  HR: 60 (08 Sep 2020 11:13) (60 - 65)  BP: 116/80 (08 Sep 2020 11:13) (116/80 - 149/73)  BP(mean): --  RR: 17 (08 Sep 2020 11:13) (16 - 18)  SpO2: 95% (08 Sep 2020 11:13) (95% - 100%)           Daily Height in cm: 170.18 (07 Sep 2020 18:35)    Daily Weight in k.6 (08 Sep 2020 05:06)  LABS:                        11.5   5.16  )-----------( 155      ( 08 Sep 2020 06:57 )             34.4             09-08    138  |  102  |  6<L>  ----------------------------<  158<H>  4.2   |  29  |  0.67    Ca    8.9      08 Sep 2020 06:57  Phos  2.7     09-08  Mg     2.5     09-08    TPro  7.1  /  Alb  3.1<L>  /  TBili  0.4  /  DBili  x   /  AST  18  /  ALT  31  /  AlkPhos  60  09-07                Urinalysis Basic - ( 07 Sep 2020 13:30 )    Color: Yellow / Appearance: Clear / S.005 / pH: x  Gluc: x / Ketone: Negative  / Bili: Negative / Urobili: 1 mg/dL   Blood: x / Protein: Negative mg/dL / Nitrite: Negative   Leuk Esterase: Negative / RBC: x / WBC x   Sq Epi: x / Non Sq Epi: x / Bacteria: x      Interval Radiology studies: reviewed by me  < from: Xray Kidney Ureter Bladder (20 @ 16:09) >  Impression: NG tube in position.    < end of copied text >    MEDICATIONS  (STANDING):  dexAMETHasone  IVPB 10 milliGRAM(s) IV Intermittent every 8 hours  dextrose 5% + sodium chloride 0.45% with potassium chloride 20 mEq/L 1000 milliLiter(s) (110 mL/Hr) IV Continuous <Continuous>  enalaprilat Injectable 1.25 milliGRAM(s) IV Push every 6 hours  heparin   Injectable 5000 Unit(s) SubCutaneous every 8 hours  metoclopramide Injectable 10 milliGRAM(s) IV Push every 8 hours  octreotide  Infusion 25 MICROgram(s)/Hr (5 mL/Hr) IV Continuous <Continuous>  pantoprazole  Injectable 40 milliGRAM(s) IV Push daily  potassium chloride    Tablet ER 10 milliEquivalent(s) Oral daily    MEDICATIONS  (PRN):  ondansetron Injectable 4 milliGRAM(s) IV Push every 8 hours PRN Nausea and/or Vomiting

## 2020-09-08 NOTE — CONSULT NOTE ADULT - SUBJECTIVE AND OBJECTIVE BOX
Reason for Consultation: uterine Cancer    HPI: Patient is a 60y Female seen on consultatioin for the evaluation and management of uterine cancer    61 y/o female with PMHx breast CA diagnosed in 2017 (s/p bilateral mastectomy and breast reconstruction), abdominal carcinomatosis (s/p chemo/radiation  -  and hysterectomy 10/2019), HTN, GERD, sleep apnea   now presents to the ED accompanied by her daughter c/o worsening abdominal pain of two days duration, described as severe, 12/10 pain, crampy, nonradiating periumbilical, and worse at night/with lying down. She is known to service and was recently discharged last week following conservative tx for SBO. She reports she vomited twice yesterday, NBNB. Pt admits she used miralax and an enema and had a BM this am. Pain is similar to last week. No nausea/vomiting at present. Patient denies fever, chills, diarrhea, melena, hematochezia, dysuria, hematuria, chest pain, shortness of breath, dizziness, cough. She also complains of back pain; ambulates independently at home.      PAST MEDICAL & SURGICAL HISTORY:  Essential hypertension  Cancer of breast: Rt. Female  Sleep apnea: Use Oral Device  Obesity (BMI 30.0-34.9)  Seasonal allergies  Acid reflux  S/P hysterectomy  S/P breast reconstruction, bilateral  S/P bilateral mastectomy  Knee injury: Left &amp; had surgery about 30 years ago        MEDICATIONS  (STANDING):  dexAMETHasone  IVPB 10 milliGRAM(s) IV Intermittent every 8 hours  dextrose 5% + sodium chloride 0.45% with potassium chloride 20 mEq/L 1000 milliLiter(s) (110 mL/Hr) IV Continuous <Continuous>  enalaprilat Injectable 1.25 milliGRAM(s) IV Push every 6 hours  heparin   Injectable 5000 Unit(s) SubCutaneous every 8 hours  metoclopramide Injectable 10 milliGRAM(s) IV Push every 8 hours  octreotide  Infusion 25 MICROgram(s)/Hr (5 mL/Hr) IV Continuous <Continuous>  pantoprazole  Injectable 40 milliGRAM(s) IV Push daily  potassium chloride    Tablet ER 10 milliEquivalent(s) Oral daily    MEDICATIONS  (PRN):  ondansetron Injectable 4 milliGRAM(s) IV Push every 8 hours PRN Nausea and/or Vomiting      Allergies    No Known Allergies    Intolerances        SOCIAL HISTORY:    Smoking Status: no  Alcohol: no  Marital Status: yes  Occupation: yes    Vital Signs Last 24 Hrs  T(C): 36.4 (08 Sep 2020 05:06), Max: 37.1 (07 Sep 2020 11:58)  T(F): 97.6 (08 Sep 2020 05:06), Max: 98.7 (07 Sep 2020 11:58)  HR: 63 (08 Sep 2020 05:06) (60 - 67)  BP: 146/85 (08 Sep 2020 05:06) (131/69 - 149/73)  BP(mean): --  RR: 16 (08 Sep 2020 05:06) (16 - 18)  SpO2: 100% (08 Sep 2020 05:06) (95% - 100%)    PHYSICAL EXAM:    general - AAO x 3  HEENT - No Icterus, NGT  CVS - RRR  RS - AE B/L  Abd - soft, NT  Ext - Pulses +        LABS:                        11.5   5.16  )-----------( 155      ( 08 Sep 2020 06:57 )             34.4     09-08    138  |  102  |  6<L>  ----------------------------<  158<H>  4.2   |  29  |  0.67    Ca    8.9      08 Sep 2020 06:57  Phos  2.7     09-08  Mg     2.5     09-08    TPro  7.1  /  Alb  3.1<L>  /  TBili  0.4  /  DBili  x   /  AST  18  /  ALT  31  /  AlkPhos  60  09-07      Urinalysis Basic - ( 07 Sep 2020 13:30 )    Color: Yellow / Appearance: Clear / S.005 / pH: x  Gluc: x / Ketone: Negative  / Bili: Negative / Urobili: 1 mg/dL   Blood: x / Protein: Negative mg/dL / Nitrite: Negative   Leuk Esterase: Negative / RBC: x / WBC x   Sq Epi: x / Non Sq Epi: x / Bacteria: x          RADIOLOGY & ADDITIONAL STUDIES:

## 2020-09-08 NOTE — PROGRESS NOTE ADULT - ASSESSMENT
61 yo female with PMHx breast CA diagnosed in 2017 (s/p bilateral mastectomy and breast reconstruction), abdominal carcinomatosis (s/p chemo/radiation 12/19 - 4/20 and hysterectomy 10/2019), HTN, GERD, sleep apnea p/w recurrent SBO.    SBO  - CT showed a small bowel obstruction in the midabdomen  - management as per surgery - NGT w/ NPO, anti-emetic & pain meds prn. Serial AXR. follow labs daily.     HTN  - reasonably controlled.  Cont IV enalaprilat while patient is NPO. Monitor    GERD  - c/w IV Protonix    Macrocytosis. check vitamin B12 and folate level.     HSQ

## 2020-09-08 NOTE — CHART NOTE - NSCHARTNOTEFT_GEN_A_CORE
As per discussion with Dr. Resendiz, patient added on to the OR schedule Wed for Exploratory laparotomy, lysis of adhesions, possible bowel resection.     Preop labs ordered  NPO, IVF  Discussed plan with patient and patient's family at bedside, she understands risks/benefits/alternatives of operative and nonoperative management and agrees to proceed with surgery tomorrow. Consent signed and placed in chart.   Continue current management   Await medical clearance for OR Wed

## 2020-09-08 NOTE — PROGRESS NOTE ADULT - SUBJECTIVE AND OBJECTIVE BOX
Patient seen and examined at bedside resting comfortably. +flatus, no BM, currently on malignant sbo regimen.   Abdominal pain has improved.  Denies nausea and vomiting. Tolerating diet.  Denies chest pain, dyspnea, cough.    T(F): 97.6 (09-08-20 @ 05:06), Max: 98.7 (09-07-20 @ 11:58)  HR: 63 (09-08-20 @ 05:06) (60 - 79)  BP: 146/85 (09-08-20 @ 05:06) (131/69 - 154/85)  RR: 16 (09-08-20 @ 05:06) (16 - 18)  SpO2: 100% (09-08-20 @ 05:06) (95% - 100%)  Wt(kg): --  CAPILLARY BLOOD GLUCOSE    PE:  GENERAL: NAD, well-developed. Alert & Oriented X3  CHEST/LUNG: Clear to auscultation bilaterally; No rales, rhonchi, wheezing  HEART: Regular rate and rhythm. S1S2  ABDOMEN: Well-healed midline surgical scar and other abdominal scars from patient's prior procedures. Soft with mild distention, and generalized mild tenderness to palpation, no guarding, no rigidity. Hypoactive bowel sounds. NGT in place with scant clear output  EXTREMITIES:  2+ Peripheral Pulses, No clubbing, cyanosis, or edema    LABS:                        11.7   5.44  )-----------( 180      ( 07 Sep 2020 11:50 )             34.8     09-07    143  |  106  |  11  ----------------------------<  89  3.5   |  31  |  0.72    Ca    8.9      07 Sep 2020 11:50  Mg     2.5     09-07    TPro  7.1  /  Alb  3.1<L>  /  TBili  0.4  /  DBili  x   /  AST  18  /  ALT  31  /  AlkPhos  60  09-07      I&O's Detail    07 Sep 2020 07:01  -  08 Sep 2020 06:59  --------------------------------------------------------  IN:    dextrose 5% + sodium chloride 0.45% with potassium chloride 20 mEq/L: 1320 mL    octreotide  Infusion: 45 mL    Solution: 100 mL    Solution: 100 mL  Total IN: 1565 mL    OUT:    Voided: 450 mL  Total OUT: 450 mL    Total NET: 1115 mL      61 y/o female with PMHx breast CA diagnosed in 2017 (s/p bilateral mastectomy and breast reconstruction), abdominal carcinomatosis (s/p chemo/radiation 12/19 - 4/20 and hysterectomy 10/2019), HTN, GERD, sleep apnea admitted with SBO, now with flatus     Problem/Plan - 1:  ·  Problem: SBO (small bowel obstruction).    Plan:  -NPO, NGT to LWS, IVF  -cont malignant sbo regimen  -pain management prn  -anti-emetic prn  -serial abdominal exams and close monitoring  discussed with Dr Beltran.

## 2020-09-09 ENCOUNTER — TRANSCRIPTION ENCOUNTER (OUTPATIENT)
Age: 60
End: 2020-09-09

## 2020-09-09 LAB
ANION GAP SERPL CALC-SCNC: 3 MMOL/L — LOW (ref 5–17)
APTT BLD: 35 SEC — SIGNIFICANT CHANGE UP (ref 27.5–35.5)
BLD GP AB SCN SERPL QL: SIGNIFICANT CHANGE UP
BUN SERPL-MCNC: 7 MG/DL — SIGNIFICANT CHANGE UP (ref 7–23)
CALCIUM SERPL-MCNC: 9.1 MG/DL — SIGNIFICANT CHANGE UP (ref 8.5–10.1)
CANCER AG125 SERPL-ACNC: 291 U/ML — HIGH
CHLORIDE SERPL-SCNC: 106 MMOL/L — SIGNIFICANT CHANGE UP (ref 96–108)
CO2 SERPL-SCNC: 32 MMOL/L — HIGH (ref 22–31)
CREAT SERPL-MCNC: 0.75 MG/DL — SIGNIFICANT CHANGE UP (ref 0.5–1.3)
FOLATE SERPL-MCNC: 9.1 NG/ML — SIGNIFICANT CHANGE UP
GLUCOSE SERPL-MCNC: 143 MG/DL — HIGH (ref 70–99)
HCT VFR BLD CALC: 37 % — SIGNIFICANT CHANGE UP (ref 34.5–45)
HGB BLD-MCNC: 12.1 G/DL — SIGNIFICANT CHANGE UP (ref 11.5–15.5)
INR BLD: 1.13 RATIO — SIGNIFICANT CHANGE UP (ref 0.88–1.16)
MAGNESIUM SERPL-MCNC: 2.5 MG/DL — SIGNIFICANT CHANGE UP (ref 1.6–2.6)
MCHC RBC-ENTMCNC: 32.7 GM/DL — SIGNIFICANT CHANGE UP (ref 32–36)
MCHC RBC-ENTMCNC: 33.3 PG — SIGNIFICANT CHANGE UP (ref 27–34)
MCV RBC AUTO: 101.9 FL — HIGH (ref 80–100)
NRBC # BLD: 0 /100 WBCS — SIGNIFICANT CHANGE UP (ref 0–0)
PHOSPHATE SERPL-MCNC: 2.1 MG/DL — LOW (ref 2.5–4.5)
PLATELET # BLD AUTO: 201 K/UL — SIGNIFICANT CHANGE UP (ref 150–400)
POTASSIUM SERPL-MCNC: 3.7 MMOL/L — SIGNIFICANT CHANGE UP (ref 3.5–5.3)
POTASSIUM SERPL-SCNC: 3.7 MMOL/L — SIGNIFICANT CHANGE UP (ref 3.5–5.3)
PROTHROM AB SERPL-ACNC: 13 SEC — SIGNIFICANT CHANGE UP (ref 10.6–13.6)
RBC # BLD: 3.63 M/UL — LOW (ref 3.8–5.2)
RBC # FLD: 14.2 % — SIGNIFICANT CHANGE UP (ref 10.3–14.5)
SODIUM SERPL-SCNC: 141 MMOL/L — SIGNIFICANT CHANGE UP (ref 135–145)
VIT B12 SERPL-MCNC: 1272 PG/ML — HIGH (ref 232–1245)
WBC # BLD: 7.19 K/UL — SIGNIFICANT CHANGE UP (ref 3.8–10.5)
WBC # FLD AUTO: 7.19 K/UL — SIGNIFICANT CHANGE UP (ref 3.8–10.5)

## 2020-09-09 PROCEDURE — 77001 FLUOROGUIDE FOR VEIN DEVICE: CPT | Mod: 26

## 2020-09-09 PROCEDURE — 99232 SBSQ HOSP IP/OBS MODERATE 35: CPT

## 2020-09-09 PROCEDURE — 36556 INSERT NON-TUNNEL CV CATH: CPT

## 2020-09-09 PROCEDURE — 76937 US GUIDE VASCULAR ACCESS: CPT | Mod: 26

## 2020-09-09 RX ORDER — POTASSIUM PHOSPHATE, MONOBASIC POTASSIUM PHOSPHATE, DIBASIC 236; 224 MG/ML; MG/ML
15 INJECTION, SOLUTION INTRAVENOUS ONCE
Refills: 0 | Status: COMPLETED | OUTPATIENT
Start: 2020-09-09 | End: 2020-09-09

## 2020-09-09 RX ORDER — CHLORHEXIDINE GLUCONATE 213 G/1000ML
1 SOLUTION TOPICAL
Refills: 0 | Status: DISCONTINUED | OUTPATIENT
Start: 2020-09-09 | End: 2020-09-10

## 2020-09-09 RX ADMIN — DEXTROSE MONOHYDRATE, SODIUM CHLORIDE, AND POTASSIUM CHLORIDE 110 MILLILITER(S): 50; .745; 4.5 INJECTION, SOLUTION INTRAVENOUS at 18:13

## 2020-09-09 RX ADMIN — POTASSIUM PHOSPHATE, MONOBASIC POTASSIUM PHOSPHATE, DIBASIC 62.5 MILLIMOLE(S): 236; 224 INJECTION, SOLUTION INTRAVENOUS at 18:02

## 2020-09-09 RX ADMIN — OCTREOTIDE ACETATE 5 MICROGRAM(S)/HR: 200 INJECTION, SOLUTION INTRAVENOUS; SUBCUTANEOUS at 18:57

## 2020-09-09 RX ADMIN — HEPARIN SODIUM 5000 UNIT(S): 5000 INJECTION INTRAVENOUS; SUBCUTANEOUS at 21:59

## 2020-09-09 RX ADMIN — HEPARIN SODIUM 5000 UNIT(S): 5000 INJECTION INTRAVENOUS; SUBCUTANEOUS at 06:26

## 2020-09-09 RX ADMIN — Medication 2.5 MILLIGRAM(S): at 18:30

## 2020-09-09 RX ADMIN — Medication 10 MILLIGRAM(S): at 18:13

## 2020-09-09 RX ADMIN — Medication 1.25 MILLIGRAM(S): at 06:27

## 2020-09-09 RX ADMIN — Medication 10 MILLIGRAM(S): at 06:27

## 2020-09-09 RX ADMIN — PANTOPRAZOLE SODIUM 40 MILLIGRAM(S): 20 TABLET, DELAYED RELEASE ORAL at 11:45

## 2020-09-09 RX ADMIN — DEXTROSE MONOHYDRATE, SODIUM CHLORIDE, AND POTASSIUM CHLORIDE 110 MILLILITER(S): 50; .745; 4.5 INJECTION, SOLUTION INTRAVENOUS at 06:33

## 2020-09-09 RX ADMIN — Medication 10 MILLIEQUIVALENT(S): at 11:42

## 2020-09-09 RX ADMIN — Medication 102 MILLIGRAM(S): at 06:28

## 2020-09-09 RX ADMIN — Medication 102 MILLIGRAM(S): at 18:03

## 2020-09-09 RX ADMIN — HEPARIN SODIUM 5000 UNIT(S): 5000 INJECTION INTRAVENOUS; SUBCUTANEOUS at 18:12

## 2020-09-09 NOTE — PROGRESS NOTE ADULT - ASSESSMENT
59 yo female with PMHx breast CA diagnosed in 2017 (s/p bilateral mastectomy and breast reconstruction), abdominal carcinomatosis (s/p chemo/radiation 12/19 - 4/20 and hysterectomy 10/2019), HTN, GERD, sleep apnea p/w recurrent SBO.    SBO likely mechanical  - CT showed a small bowel obstruction in the midabdomen  - cont conservative management. reviewed surgery service recs. plan for exploratomy laparotomy and adhesionolysis and possible bowel resection. follow post procedure recs. cont IVF.     HTN  - uncontrolled. increase enalapril and placed holding parameters. Monitor.     GERD  - c/w IV Protonix    Macrocytosis. Pending vitamin B12 and folate level.     HSQ 61 yo female with PMHx breast CA diagnosed in 2017 (s/p bilateral mastectomy and breast reconstruction), abdominal carcinomatosis (s/p chemo/radiation 12/19 - 4/20 and hysterectomy 10/2019), HTN, GERD, sleep apnea p/w recurrent SBO.    SBO likely mechanical. uterine cancer per oncology.   - CT showed a small bowel obstruction in the midabdomen  - cont conservative management. reviewed surgery service recs. ppan for exploratory laparotomy and adhesionolysis and possible bowel resection. Follow post procedure recs. cont IVF.   -Patient meets > 4 METs at baseline. Benefits overweigh risks of doing the laparotomy.  So can proceed with it.     HTN  - uncontrolled. increase enalapril and placed holding parameters. Monitor.     GERD  - c/w IV Protonix    Macrocytosis. Pending vitamin B12 and folate level.     HSQ

## 2020-09-09 NOTE — PROGRESS NOTE ADULT - SUBJECTIVE AND OBJECTIVE BOX
INTERVAL HPI/OVERNIGHT EVENTS:  Pt. seen and examined at bedside resting comfortably. No new events. C/o mild diffuse abdominal pains, about the same as the last couple days. Denies N/V. +flatus. No BM.   Ambulating and voiding well.  Denies fever/chills, chest pain, dyspnea, cough, dizziness.     Vital Signs Last 24 Hrs  T(C): 36.7 (09 Sep 2020 05:02), Max: 37 (08 Sep 2020 23:09)  T(F): 98 (09 Sep 2020 05:02), Max: 98.6 (08 Sep 2020 23:09)  HR: 62 (09 Sep 2020 05:02) (60 - 63)  BP: 166/86 (09 Sep 2020 05:02) (116/80 - 166/86)  RR: 18 (09 Sep 2020 05:02) (17 - 18)  SpO2: 100% (09 Sep 2020 05:02) (95% - 100%)    PHYSICAL EXAM:    GENERAL: NAD. NGT with clear gastric output (250cc/24hr)  CHEST/LUNG: Clear to ausculation, bilaterally   HEART: S1S2, RRR  ABDOMEN: Well-healed midline surgical scar and other abdominal scars from patient's prior procedures. Nondistended, soft with generalized mild tenderness to palpation, no guarding, no rigidity. +bowel sounds  EXTREMITIES:  calf soft, non tender b/l. 2+ distal pulses b/l.     I&O's Detail    08 Sep 2020 07:01  -  09 Sep 2020 07:00  --------------------------------------------------------  IN:    dextrose 5% + sodium chloride 0.45% with potassium chloride 20 mEq/L: 1340 mL    octreotide  Infusion: 50 mL    Solution: 100 mL  Total IN: 1490 mL    OUT:    Estimated Blood Loss: 300 mL    Nasoenteral Tube: 250 mL  Total OUT: 550 mL    Total NET: 940 mL      LABS:                        11.5   5.16  )-----------( 155      ( 08 Sep 2020 06:57 )             34.4     09-08    138  |  102  |  6<L>  ----------------------------<  158<H>  4.2   |  29  |  0.67    Ca    8.9      08 Sep 2020 06:57  Phos  2.7     -  Mg     2.5     -    TPro  7.1  /  Alb  3.1<L>  /  TBili  0.4  /  DBili  x   /  AST  18  /  ALT  31  /  AlkPhos  60  -      Urinalysis Basic - ( 07 Sep 2020 13:30 )    Color: Yellow / Appearance: Clear / S.005 / pH: x  Gluc: x / Ketone: Negative  / Bili: Negative / Urobili: 1 mg/dL   Blood: x / Protein: Negative mg/dL / Nitrite: Negative   Leuk Esterase: Negative / RBC: x / WBC x   Sq Epi: x / Non Sq Epi: x / Bacteria: x    A/P: 59 y/o female with PMHx breast CA diagnosed in  (s/p bilateral mastectomy and breast reconstruction), abdominal carcinomatosis (s/p chemo/radiation  -  and hysterectomy 10/2019), HTN, GERD, sleep apnea admitted with recurrent SBO, +flatus     -OR rescheduled for Thursday due to limited OR availability today. Patient aware. Consent in chart for surgery.  -D/C NGT  -continue NPO with IV hydration  -cont malignant sbo regimen (Octreotide, Decadron, Reglan)  -Serial abd exams, monitor for GI function  -DVT ppx, OOB to ambulate  -medical follow up, medical optimization for OR Thursday  -Preop labs, covid neg  Discussed with Dr. Resendiz

## 2020-09-09 NOTE — PROGRESS NOTE ADULT - SUBJECTIVE AND OBJECTIVE BOX
lying in bed for OR on thursday    Vital Signs Last 24 Hrs  T(C): 36.7 (09 Sep 2020 05:02), Max: 37 (08 Sep 2020 23:09)  T(F): 98 (09 Sep 2020 05:02), Max: 98.6 (08 Sep 2020 23:09)  HR: 62 (09 Sep 2020 05:02) (60 - 63)  BP: 166/86 (09 Sep 2020 05:02) (116/80 - 166/86)  BP(mean): --  RR: 18 (09 Sep 2020 05:02) (17 - 18)  SpO2: 100% (09 Sep 2020 05:02) (95% - 100%)    PHYSICAL EXAM:    general - AAO x 3  HEENT - No Icterus, NGT +  CVS - RRR  RS - AE B/L  Abd - soft, NT  Ext - Pulses +        LABS:                        12.1   7.19  )-----------( 201      ( 09 Sep 2020 09:58 )             37.0     09-08    138  |  102  |  6<L>  ----------------------------<  158<H>  4.2   |  29  |  0.67    Ca    8.9      08 Sep 2020 06:57  Phos  2.7     09-08  Mg     2.5     09-08    TPro  7.1  /  Alb  3.1<L>  /  TBili  0.4  /  DBili  x   /  AST  18  /  ALT  31  /  AlkPhos  60  09-07    PT/INR - ( 09 Sep 2020 09:58 )   PT: 13.0 sec;   INR: 1.13 ratio         PTT - ( 09 Sep 2020 09:58 )  PTT:35.0 sec  Urinalysis Basic - ( 07 Sep 2020 13:30 )    Color: Yellow / Appearance: Clear / S.005 / pH: x  Gluc: x / Ketone: Negative  / Bili: Negative / Urobili: 1 mg/dL   Blood: x / Protein: Negative mg/dL / Nitrite: Negative   Leuk Esterase: Negative / RBC: x / WBC x   Sq Epi: x / Non Sq Epi: x / Bacteria: x          RADIOLOGY & ADDITIONAL STUDIES:

## 2020-09-09 NOTE — PROGRESS NOTE ADULT - SUBJECTIVE AND OBJECTIVE BOX
CHIEF COMPLAINT:  No BM so far.  no change in abd distension  no chest pain no sob  no fever no n/v      PHYSICAL EXAM:    GENERAL: well built, well nourished  CHEST/LUNG: Clear to ausculation bilaterally, no wheezing, no crackles   HEART: Regular rate and rhythm; No murmurs, rubs  ABDOMEN: Distended and + BS. non tender   EXTREMITIES:  Moving all four extremities spontaneously, No clubbing, cyanosis, or edema  NERVOUS SYSTEM:  Grossly non focal.  Psychiatry: AAO x 3, mood is appropriate       OBJECTIVE DATA:       Vital Signs Last 24 Hrs  T(C): 36.7 (09 Sep 2020 05:02), Max: 37 (08 Sep 2020 23:09)  T(F): 98 (09 Sep 2020 05:02), Max: 98.6 (08 Sep 2020 23:09)  HR: 62 (09 Sep 2020 05:02) (60 - 63)  BP: 166/86 (09 Sep 2020 05:02) (116/80 - 166/86)  BP(mean): --  RR: 18 (09 Sep 2020 05:02) (17 - 18)  SpO2: 100% (09 Sep 2020 05:02) (95% - 100%)           Daily     Daily Weight in k.8 (09 Sep 2020 05:02)  LABS:                        12.1   7.19  )-----------( 201      ( 09 Sep 2020 09:58 )             37.0             -    141  |  106  |  7   ----------------------------<  143<H>  3.7   |  32<H>  |  0.75    Ca    9.1      09 Sep 2020 09:58  Phos  2.1     -  Mg     2.5     -    TPro  7.1  /  Alb  3.1<L>  /  TBili  0.4  /  DBili  x   /  AST  18  /  ALT  31  /  AlkPhos  60  09-07              PT/INR - ( 09 Sep 2020 09:58 )   PT: 13.0 sec;   INR: 1.13 ratio         PTT - ( 09 Sep 2020 09:58 )  PTT:35.0 sec  Urinalysis Basic - ( 07 Sep 2020 13:30 )    Color: Yellow / Appearance: Clear / S.005 / pH: x  Gluc: x / Ketone: Negative  / Bili: Negative / Urobili: 1 mg/dL   Blood: x / Protein: Negative mg/dL / Nitrite: Negative   Leuk Esterase: Negative / RBC: x / WBC x   Sq Epi: x / Non Sq Epi: x / Bacteria: x    	  MEDICATIONS  (STANDING):  dexAMETHasone  IVPB 10 milliGRAM(s) IV Intermittent every 8 hours  dextrose 5% + sodium chloride 0.45% with potassium chloride 20 mEq/L 1000 milliLiter(s) (110 mL/Hr) IV Continuous <Continuous>  heparin   Injectable 5000 Unit(s) SubCutaneous every 8 hours  metoclopramide Injectable 10 milliGRAM(s) IV Push every 8 hours  octreotide  Infusion 25 MICROgram(s)/Hr (5 mL/Hr) IV Continuous <Continuous>  pantoprazole  Injectable 40 milliGRAM(s) IV Push daily  potassium chloride    Tablet ER 10 milliEquivalent(s) Oral daily    MEDICATIONS  (PRN):  enalaprilat Injectable 2.5 milliGRAM(s) IV Push every 6 hours PRN hypertension with SBP > 120  ondansetron Injectable 4 milliGRAM(s) IV Push every 8 hours PRN Nausea and/or Vomiting

## 2020-09-09 NOTE — PROCEDURE NOTE - ADDITIONAL PROCEDURE DETAILS
pt s/p TLC placement inserted into the left IJ via US guidance.  Catheter tip confirmed at the cavo-atrial junction via fluoro.  Hemostasis achieved.  DSD applied.  No complications.  pt tolerated procedure well.    -TLC can be accessed

## 2020-09-10 LAB
ANION GAP SERPL CALC-SCNC: 3 MMOL/L — LOW (ref 5–17)
BUN SERPL-MCNC: 8 MG/DL — SIGNIFICANT CHANGE UP (ref 7–23)
CALCIUM SERPL-MCNC: 8.5 MG/DL — SIGNIFICANT CHANGE UP (ref 8.5–10.1)
CHLORIDE SERPL-SCNC: 104 MMOL/L — SIGNIFICANT CHANGE UP (ref 96–108)
CO2 SERPL-SCNC: 34 MMOL/L — HIGH (ref 22–31)
CREAT SERPL-MCNC: 0.73 MG/DL — SIGNIFICANT CHANGE UP (ref 0.5–1.3)
GLUCOSE BLDC GLUCOMTR-MCNC: 71 MG/DL — SIGNIFICANT CHANGE UP (ref 70–99)
GLUCOSE SERPL-MCNC: 132 MG/DL — HIGH (ref 70–99)
HCT VFR BLD CALC: 33.6 % — LOW (ref 34.5–45)
HGB BLD-MCNC: 10.9 G/DL — LOW (ref 11.5–15.5)
MAGNESIUM SERPL-MCNC: 2.4 MG/DL — SIGNIFICANT CHANGE UP (ref 1.6–2.6)
MCHC RBC-ENTMCNC: 32.4 GM/DL — SIGNIFICANT CHANGE UP (ref 32–36)
MCHC RBC-ENTMCNC: 33.1 PG — SIGNIFICANT CHANGE UP (ref 27–34)
MCV RBC AUTO: 102.1 FL — HIGH (ref 80–100)
NRBC # BLD: 0 /100 WBCS — SIGNIFICANT CHANGE UP (ref 0–0)
PHOSPHATE SERPL-MCNC: 2.7 MG/DL — SIGNIFICANT CHANGE UP (ref 2.5–4.5)
PLATELET # BLD AUTO: 165 K/UL — SIGNIFICANT CHANGE UP (ref 150–400)
POTASSIUM SERPL-MCNC: 3.3 MMOL/L — LOW (ref 3.5–5.3)
POTASSIUM SERPL-SCNC: 3.3 MMOL/L — LOW (ref 3.5–5.3)
RBC # BLD: 3.29 M/UL — LOW (ref 3.8–5.2)
RBC # FLD: 14.1 % — SIGNIFICANT CHANGE UP (ref 10.3–14.5)
SODIUM SERPL-SCNC: 141 MMOL/L — SIGNIFICANT CHANGE UP (ref 135–145)
WBC # BLD: 5.84 K/UL — SIGNIFICANT CHANGE UP (ref 3.8–10.5)
WBC # FLD AUTO: 5.84 K/UL — SIGNIFICANT CHANGE UP (ref 3.8–10.5)

## 2020-09-10 PROCEDURE — 99232 SBSQ HOSP IP/OBS MODERATE 35: CPT

## 2020-09-10 PROCEDURE — 44005 FREEING OF BOWEL ADHESION: CPT

## 2020-09-10 PROCEDURE — 99232 SBSQ HOSP IP/OBS MODERATE 35: CPT | Mod: 57

## 2020-09-10 PROCEDURE — 44005 FREEING OF BOWEL ADHESION: CPT | Mod: 80

## 2020-09-10 RX ORDER — ONDANSETRON 8 MG/1
4 TABLET, FILM COATED ORAL EVERY 8 HOURS
Refills: 0 | Status: DISCONTINUED | OUTPATIENT
Start: 2020-09-10 | End: 2020-09-11

## 2020-09-10 RX ORDER — DEXTROSE MONOHYDRATE, SODIUM CHLORIDE, AND POTASSIUM CHLORIDE 50; .745; 4.5 G/1000ML; G/1000ML; G/1000ML
1000 INJECTION, SOLUTION INTRAVENOUS
Refills: 0 | Status: DISCONTINUED | OUTPATIENT
Start: 2020-09-10 | End: 2020-09-11

## 2020-09-10 RX ORDER — HYDROMORPHONE HYDROCHLORIDE 2 MG/ML
30 INJECTION INTRAMUSCULAR; INTRAVENOUS; SUBCUTANEOUS
Refills: 0 | Status: DISCONTINUED | OUTPATIENT
Start: 2020-09-10 | End: 2020-09-14

## 2020-09-10 RX ORDER — PANTOPRAZOLE SODIUM 20 MG/1
40 TABLET, DELAYED RELEASE ORAL DAILY
Refills: 0 | Status: DISCONTINUED | OUTPATIENT
Start: 2020-09-10 | End: 2020-09-16

## 2020-09-10 RX ORDER — SODIUM CHLORIDE 9 MG/ML
1000 INJECTION, SOLUTION INTRAVENOUS
Refills: 0 | Status: DISCONTINUED | OUTPATIENT
Start: 2020-09-10 | End: 2020-09-11

## 2020-09-10 RX ORDER — CEFOTETAN DISODIUM 1 G
2 VIAL (EA) INJECTION EVERY 12 HOURS
Refills: 0 | Status: COMPLETED | OUTPATIENT
Start: 2020-09-10 | End: 2020-09-11

## 2020-09-10 RX ORDER — HEPARIN SODIUM 5000 [USP'U]/ML
5000 INJECTION INTRAVENOUS; SUBCUTANEOUS EVERY 8 HOURS
Refills: 0 | Status: DISCONTINUED | OUTPATIENT
Start: 2020-09-10 | End: 2020-09-11

## 2020-09-10 RX ORDER — CHLORHEXIDINE GLUCONATE 213 G/1000ML
1 SOLUTION TOPICAL
Refills: 0 | Status: DISCONTINUED | OUTPATIENT
Start: 2020-09-10 | End: 2020-09-21

## 2020-09-10 RX ORDER — HYDROMORPHONE HYDROCHLORIDE 2 MG/ML
1 INJECTION INTRAMUSCULAR; INTRAVENOUS; SUBCUTANEOUS
Refills: 0 | Status: DISCONTINUED | OUTPATIENT
Start: 2020-09-10 | End: 2020-09-11

## 2020-09-10 RX ORDER — OCTREOTIDE ACETATE 200 UG/ML
25 INJECTION, SOLUTION INTRAVENOUS; SUBCUTANEOUS
Qty: 500 | Refills: 0 | Status: DISCONTINUED | OUTPATIENT
Start: 2020-09-10 | End: 2020-09-21

## 2020-09-10 RX ORDER — METOCLOPRAMIDE HCL 10 MG
10 TABLET ORAL EVERY 8 HOURS
Refills: 0 | Status: COMPLETED | OUTPATIENT
Start: 2020-09-10 | End: 2020-09-12

## 2020-09-10 RX ORDER — POTASSIUM CHLORIDE 20 MEQ
10 PACKET (EA) ORAL
Refills: 0 | Status: COMPLETED | OUTPATIENT
Start: 2020-09-10 | End: 2020-09-10

## 2020-09-10 RX ORDER — ONDANSETRON 8 MG/1
4 TABLET, FILM COATED ORAL ONCE
Refills: 0 | Status: DISCONTINUED | OUTPATIENT
Start: 2020-09-10 | End: 2020-09-11

## 2020-09-10 RX ORDER — HYDROMORPHONE HYDROCHLORIDE 2 MG/ML
0.5 INJECTION INTRAMUSCULAR; INTRAVENOUS; SUBCUTANEOUS
Refills: 0 | Status: DISCONTINUED | OUTPATIENT
Start: 2020-09-10 | End: 2020-09-11

## 2020-09-10 RX ADMIN — DEXTROSE MONOHYDRATE, SODIUM CHLORIDE, AND POTASSIUM CHLORIDE 110 MILLILITER(S): 50; .745; 4.5 INJECTION, SOLUTION INTRAVENOUS at 05:55

## 2020-09-10 RX ADMIN — HEPARIN SODIUM 5000 UNIT(S): 5000 INJECTION INTRAVENOUS; SUBCUTANEOUS at 05:54

## 2020-09-10 RX ADMIN — OCTREOTIDE ACETATE 5 MICROGRAM(S)/HR: 200 INJECTION, SOLUTION INTRAVENOUS; SUBCUTANEOUS at 10:29

## 2020-09-10 RX ADMIN — Medication 2.5 MILLIGRAM(S): at 02:08

## 2020-09-10 RX ADMIN — Medication 100 MILLIEQUIVALENT(S): at 12:21

## 2020-09-10 RX ADMIN — Medication 2.5 MILLIGRAM(S): at 10:37

## 2020-09-10 RX ADMIN — DEXTROSE MONOHYDRATE, SODIUM CHLORIDE, AND POTASSIUM CHLORIDE 110 MILLILITER(S): 50; .745; 4.5 INJECTION, SOLUTION INTRAVENOUS at 10:30

## 2020-09-10 RX ADMIN — SODIUM CHLORIDE 100 MILLILITER(S): 9 INJECTION, SOLUTION INTRAVENOUS at 23:40

## 2020-09-10 RX ADMIN — PANTOPRAZOLE SODIUM 40 MILLIGRAM(S): 20 TABLET, DELAYED RELEASE ORAL at 12:21

## 2020-09-10 RX ADMIN — HEPARIN SODIUM 5000 UNIT(S): 5000 INJECTION INTRAVENOUS; SUBCUTANEOUS at 14:11

## 2020-09-10 RX ADMIN — Medication 100 MILLIEQUIVALENT(S): at 08:21

## 2020-09-10 RX ADMIN — HYDROMORPHONE HYDROCHLORIDE 30 MILLILITER(S): 2 INJECTION INTRAMUSCULAR; INTRAVENOUS; SUBCUTANEOUS at 23:25

## 2020-09-10 RX ADMIN — Medication 100 MILLIEQUIVALENT(S): at 10:30

## 2020-09-10 NOTE — BRIEF OPERATIVE NOTE - NSICDXBRIEFPROCEDURE_GEN_ALL_CORE_FT
PROCEDURES:  Lysis of intestinal adhesions 10-Sep-2020 23:19:31  Deb Baker  Exploratory laparotomy 10-Sep-2020 23:19:00  Deb Baker  Diagnostic laparoscopy 10-Sep-2020 23:18:49  Deb Baker

## 2020-09-10 NOTE — PROGRESS NOTE ADULT - SUBJECTIVE AND OBJECTIVE BOX
CHIEF COMPLAINT:  NPO  passing gas.   no chest pain no sob  no fever no n/v      PHYSICAL EXAM:    GENERAL: well built, well nourished  CHEST/LUNG: Clear to ausculation bilaterally, no wheezing, no crackles   HEART: Regular rate and rhythm; No murmurs, rubs  ABDOMEN: Distended and + BS. non tender   EXTREMITIES:  Moving all four extremities spontaneously, No clubbing, cyanosis, or edema  NERVOUS SYSTEM:  Grossly non focal.  Psychiatry: AAO x 3, mood is appropriate       OBJECTIVE DATA:       Vital Signs Last 24 Hrs  T(C): 36.4 (10 Sep 2020 05:19), Max: 36.8 (09 Sep 2020 13:11)  T(F): 97.6 (10 Sep 2020 05:19), Max: 98.3 (09 Sep 2020 13:11)  HR: 57 (10 Sep 2020 09:30) (57 - 68)  BP: 166/83 (10 Sep 2020 09:30) (156/78 - 173/93)  BP(mean): --  RR: 18 (10 Sep 2020 09:30) (17 - 18)  SpO2: 100% (10 Sep 2020 09:30) (99% - 100%)           Daily     Daily Weight in k.6 (10 Sep 2020 05:19)  LABS:                        10.9   5.84  )-----------( 165      ( 10 Sep 2020 04:30 )             33.6             09-10    141  |  104  |  8   ----------------------------<  132<H>  3.3<L>   |  34<H>  |  0.73    Ca    8.5      10 Sep 2020 04:30  Phos  2.7     09-10  Mg     2.4     09-10                PT/INR - ( 09 Sep 2020 09:58 )   PT: 13.0 sec;   INR: 1.13 ratio         PTT - ( 09 Sep 2020 09:58 )  PTT:35.0 sec       	    MEDICATIONS  (STANDING):  chlorhexidine 4% Liquid 1 Application(s) Topical <User Schedule>  dextrose 5% + sodium chloride 0.45% with potassium chloride 20 mEq/L 1000 milliLiter(s) (110 mL/Hr) IV Continuous <Continuous>  heparin   Injectable 5000 Unit(s) SubCutaneous every 8 hours  octreotide  Infusion 25 MICROgram(s)/Hr (5 mL/Hr) IV Continuous <Continuous>  pantoprazole  Injectable 40 milliGRAM(s) IV Push daily  potassium chloride  10 mEq/100 mL IVPB 10 milliEquivalent(s) IV Intermittent every 1 hour    MEDICATIONS  (PRN):  enalaprilat Injectable 2.5 milliGRAM(s) IV Push every 6 hours PRN hypertension with SBP > 120  ondansetron Injectable 4 milliGRAM(s) IV Push every 8 hours PRN Nausea and/or Vomiting

## 2020-09-10 NOTE — PROGRESS NOTE ADULT - ASSESSMENT
59 yo female with PMHx breast CA diagnosed in 2017 (s/p bilateral mastectomy and breast reconstruction), abdominal carcinomatosis (s/p chemo/radiation 12/19 - 4/20 and hysterectomy 10/2019), HTN, GERD, sleep apnea p/w recurrent SBO.    SBO likely mechanical. uterine cancer per oncology.   - CT showed a small bowel obstruction in the midabdomen  - NPO for exploratory laparotomy and adhesionolysis and possible bowel resection later today. Follow post procedure recs. cont IVF.   -discussed with care team and surgery service.     Hypokalemia. Replete and recheck level.     hypophosphatemia. repleted.     HTN  - still not adequately controlled. cont increased enalapril with holding parameters. Monitor.     GERD  - c/w IV Protonix    Macrocytosis. Unremarkable vitamin B12 and folate level.     HSQ per primary team.

## 2020-09-10 NOTE — PROGRESS NOTE ADULT - SUBJECTIVE AND OBJECTIVE BOX
Patient seen and examined at bedside with no complaints.   Pt c/o some periumbilical abdominal pain, although admits its somewhat improved from yesterday.   She thinks she passed gas this morning, but cannot recall the last time she had a BM.   Denies nausea, vomiting, fevers, chills, chest pain, SOB.   NGT removed yesterday (?2/2 discomfort).     Vital Signs Last 24 Hrs  T(F): 97.6 (09-10-20 @ 05:19), Max: 98.3 (09-09-20 @ 13:11)  HR: 65 (09-10-20 @ 05:19)  BP: 160/76 (09-10-20 @ 05:19)  RR: 18 (09-10-20 @ 05:19)  SpO2: 100% (09-10-20 @ 05:19)      GENERAL: Alert, NAD  CHEST/LUNG: Respirations nonlabored  HEART: Regular rate and rhythm  ABDOMEN: Hypoactive BS, soft, Nondistended, mildly ttp of RUQ, no rebound or guarding noted  EXTREMITIES:  no calf tenderness, No edema    I&O's Detail    08 Sep 2020 07:01  -  09 Sep 2020 07:00  --------------------------------------------------------  IN:    dextrose 5% + sodium chloride 0.45% with potassium chloride 20 mEq/L: 1340 mL    octreotide  Infusion: 50 mL    Solution: 100 mL  Total IN: 1490 mL    OUT:    Estimated Blood Loss: 300 mL    Nasoenteral Tube: 250 mL  Total OUT: 550 mL    Total NET: 940 mL        LABS:                        10.9   5.84  )-----------( 165      ( 10 Sep 2020 04:30 )             33.6     09-10    141  |  104  |  8   ----------------------------<  132<H>  3.3<L>   |  34<H>  |  0.73    Ca    8.5      10 Sep 2020 04:30  Phos  2.7     09-10  Mg     2.4     09-10      PT/INR - ( 09 Sep 2020 09:58 )   PT: 13.0 sec;   INR: 1.13 ratio         PTT - ( 09 Sep 2020 09:58 )  PTT:35.0 sec    Impression: 59 y/o female with PMHx breast CA diagnosed in 2017 (s/p bilateral mastectomy and breast reconstruction), abdominal carcinomatosis (s/p chemo/radiation 12/19 - 4/20 and hysterectomy 10/2019), HTN, GERD, sleep apnea admitted with recurrent SBO, +flatus.  Malignant SBO protocol.        Plan  -Patient scheduled for OR today; consent in chart.   -NPO, IVF.   -Cont malignant sbo regimen.  -Continue to monitor for bowel function and serial abdominal exams.  -DVT ppx, OOB to ambulate  -Medical input noted (cleared by medicine for OR).   -F/u preop labs, covid neg.  -Pain and nausea management.   -Will discuss with surgical attending.

## 2020-09-10 NOTE — PROGRESS NOTE ADULT - SUBJECTIVE AND OBJECTIVE BOX
for OR today    Vital Signs Last 24 Hrs  T(C): 36.4 (10 Sep 2020 05:19), Max: 36.8 (09 Sep 2020 13:11)  T(F): 97.6 (10 Sep 2020 05:19), Max: 98.3 (09 Sep 2020 13:11)  HR: 65 (10 Sep 2020 05:19) (60 - 68)  BP: 160/76 (10 Sep 2020 05:19) (156/78 - 173/93)  BP(mean): --  RR: 18 (10 Sep 2020 05:19) (17 - 18)  SpO2: 100% (10 Sep 2020 05:19) (99% - 100%)    PHYSICAL EXAM:    general - AAO x 3  HEENT - No Icterus  CVS - RRR  RS - AE B/L  Abd - soft, NT  Ext - Pulses +        LABS:                        10.9   5.84  )-----------( 165      ( 10 Sep 2020 04:30 )             33.6     09-10    141  |  104  |  8   ----------------------------<  132<H>  3.3<L>   |  34<H>  |  0.73    Ca    8.5      10 Sep 2020 04:30  Phos  2.7     09-10  Mg     2.4     09-10      PT/INR - ( 09 Sep 2020 09:58 )   PT: 13.0 sec;   INR: 1.13 ratio         PTT - ( 09 Sep 2020 09:58 )  PTT:35.0 sec        RADIOLOGY & ADDITIONAL STUDIES:

## 2020-09-11 LAB
ANION GAP SERPL CALC-SCNC: 4 MMOL/L — LOW (ref 5–17)
ANION GAP SERPL CALC-SCNC: 5 MMOL/L — SIGNIFICANT CHANGE UP (ref 5–17)
BUN SERPL-MCNC: 8 MG/DL — SIGNIFICANT CHANGE UP (ref 7–23)
BUN SERPL-MCNC: 8 MG/DL — SIGNIFICANT CHANGE UP (ref 7–23)
CALCIUM SERPL-MCNC: 8.4 MG/DL — LOW (ref 8.5–10.1)
CALCIUM SERPL-MCNC: 8.6 MG/DL — SIGNIFICANT CHANGE UP (ref 8.5–10.1)
CHLORIDE SERPL-SCNC: 102 MMOL/L — SIGNIFICANT CHANGE UP (ref 96–108)
CHLORIDE SERPL-SCNC: 105 MMOL/L — SIGNIFICANT CHANGE UP (ref 96–108)
CO2 SERPL-SCNC: 32 MMOL/L — HIGH (ref 22–31)
CO2 SERPL-SCNC: 32 MMOL/L — HIGH (ref 22–31)
CREAT SERPL-MCNC: 0.84 MG/DL — SIGNIFICANT CHANGE UP (ref 0.5–1.3)
CREAT SERPL-MCNC: 1.28 MG/DL — SIGNIFICANT CHANGE UP (ref 0.5–1.3)
GLUCOSE SERPL-MCNC: 114 MG/DL — HIGH (ref 70–99)
GLUCOSE SERPL-MCNC: 95 MG/DL — SIGNIFICANT CHANGE UP (ref 70–99)
HCT VFR BLD CALC: 36.2 % — SIGNIFICANT CHANGE UP (ref 34.5–45)
HGB BLD-MCNC: 12.1 G/DL — SIGNIFICANT CHANGE UP (ref 11.5–15.5)
MAGNESIUM SERPL-MCNC: 2.1 MG/DL — SIGNIFICANT CHANGE UP (ref 1.6–2.6)
MCHC RBC-ENTMCNC: 33.4 GM/DL — SIGNIFICANT CHANGE UP (ref 32–36)
MCHC RBC-ENTMCNC: 33.6 PG — SIGNIFICANT CHANGE UP (ref 27–34)
MCV RBC AUTO: 100.6 FL — HIGH (ref 80–100)
NRBC # BLD: 0 /100 WBCS — SIGNIFICANT CHANGE UP (ref 0–0)
PHOSPHATE SERPL-MCNC: 4.1 MG/DL — SIGNIFICANT CHANGE UP (ref 2.5–4.5)
PLATELET # BLD AUTO: 156 K/UL — SIGNIFICANT CHANGE UP (ref 150–400)
POTASSIUM SERPL-MCNC: 3.8 MMOL/L — SIGNIFICANT CHANGE UP (ref 3.5–5.3)
POTASSIUM SERPL-MCNC: 4 MMOL/L — SIGNIFICANT CHANGE UP (ref 3.5–5.3)
POTASSIUM SERPL-SCNC: 3.8 MMOL/L — SIGNIFICANT CHANGE UP (ref 3.5–5.3)
POTASSIUM SERPL-SCNC: 4 MMOL/L — SIGNIFICANT CHANGE UP (ref 3.5–5.3)
RBC # BLD: 3.6 M/UL — LOW (ref 3.8–5.2)
RBC # FLD: 14.3 % — SIGNIFICANT CHANGE UP (ref 10.3–14.5)
SODIUM SERPL-SCNC: 139 MMOL/L — SIGNIFICANT CHANGE UP (ref 135–145)
SODIUM SERPL-SCNC: 141 MMOL/L — SIGNIFICANT CHANGE UP (ref 135–145)
WBC # BLD: 13.61 K/UL — HIGH (ref 3.8–10.5)
WBC # FLD AUTO: 13.61 K/UL — HIGH (ref 3.8–10.5)

## 2020-09-11 PROCEDURE — 99232 SBSQ HOSP IP/OBS MODERATE 35: CPT

## 2020-09-11 PROCEDURE — 49320 DIAG LAPARO SEPARATE PROC: CPT | Mod: AS

## 2020-09-11 RX ORDER — ACETAMINOPHEN 500 MG
1000 TABLET ORAL ONCE
Refills: 0 | Status: COMPLETED | OUTPATIENT
Start: 2020-09-11 | End: 2020-09-11

## 2020-09-11 RX ORDER — ACETAMINOPHEN 500 MG
650 TABLET ORAL EVERY 6 HOURS
Refills: 0 | Status: DISCONTINUED | OUTPATIENT
Start: 2020-09-11 | End: 2020-09-13

## 2020-09-11 RX ORDER — HEPARIN SODIUM 5000 [USP'U]/ML
5000 INJECTION INTRAVENOUS; SUBCUTANEOUS EVERY 8 HOURS
Refills: 0 | Status: DISCONTINUED | OUTPATIENT
Start: 2020-09-12 | End: 2020-09-21

## 2020-09-11 RX ORDER — ACETAMINOPHEN 500 MG
1000 TABLET ORAL ONCE
Refills: 0 | Status: COMPLETED | OUTPATIENT
Start: 2020-09-12 | End: 2020-09-12

## 2020-09-11 RX ORDER — ENOXAPARIN SODIUM 100 MG/ML
40 INJECTION SUBCUTANEOUS DAILY
Refills: 0 | Status: DISCONTINUED | OUTPATIENT
Start: 2020-09-11 | End: 2020-09-11

## 2020-09-11 RX ORDER — SODIUM CHLORIDE 9 MG/ML
1000 INJECTION, SOLUTION INTRAVENOUS ONCE
Refills: 0 | Status: COMPLETED | OUTPATIENT
Start: 2020-09-11 | End: 2020-09-11

## 2020-09-11 RX ADMIN — Medication 100 GRAM(S): at 05:38

## 2020-09-11 RX ADMIN — OCTREOTIDE ACETATE 5 MICROGRAM(S)/HR: 200 INJECTION, SOLUTION INTRAVENOUS; SUBCUTANEOUS at 17:29

## 2020-09-11 RX ADMIN — PANTOPRAZOLE SODIUM 40 MILLIGRAM(S): 20 TABLET, DELAYED RELEASE ORAL at 11:49

## 2020-09-11 RX ADMIN — Medication 10 MILLIGRAM(S): at 21:31

## 2020-09-11 RX ADMIN — HYDROMORPHONE HYDROCHLORIDE 30 MILLILITER(S): 2 INJECTION INTRAMUSCULAR; INTRAVENOUS; SUBCUTANEOUS at 19:22

## 2020-09-11 RX ADMIN — HYDROMORPHONE HYDROCHLORIDE 30 MILLILITER(S): 2 INJECTION INTRAMUSCULAR; INTRAVENOUS; SUBCUTANEOUS at 07:27

## 2020-09-11 RX ADMIN — ENOXAPARIN SODIUM 40 MILLIGRAM(S): 100 INJECTION SUBCUTANEOUS at 11:49

## 2020-09-11 RX ADMIN — Medication 10 MILLIGRAM(S): at 05:38

## 2020-09-11 RX ADMIN — HYDROMORPHONE HYDROCHLORIDE 30 MILLILITER(S): 2 INJECTION INTRAMUSCULAR; INTRAVENOUS; SUBCUTANEOUS at 00:55

## 2020-09-11 RX ADMIN — Medication 400 MILLIGRAM(S): at 15:32

## 2020-09-11 RX ADMIN — Medication 10 MILLIGRAM(S): at 13:40

## 2020-09-11 RX ADMIN — Medication 400 MILLIGRAM(S): at 21:31

## 2020-09-11 RX ADMIN — Medication 1000 MILLIGRAM(S): at 16:00

## 2020-09-11 RX ADMIN — OCTREOTIDE ACETATE 5 MICROGRAM(S)/HR: 200 INJECTION, SOLUTION INTRAVENOUS; SUBCUTANEOUS at 00:05

## 2020-09-11 RX ADMIN — CHLORHEXIDINE GLUCONATE 1 APPLICATION(S): 213 SOLUTION TOPICAL at 05:48

## 2020-09-11 RX ADMIN — Medication 100 GRAM(S): at 17:29

## 2020-09-11 RX ADMIN — SODIUM CHLORIDE 1000 MILLILITER(S): 9 INJECTION, SOLUTION INTRAVENOUS at 13:34

## 2020-09-11 NOTE — DIETITIAN INITIAL EVALUATION ADULT. - ENERGY NEEDS
Height (cm): 170.2 (09-07)  Weight (kg): 80 (09-10)  BMI (kg/m2): 27.6 (09-10)  IBW: 61.2 kg         % IBW: 118%            UBW: 88.9 kg             %UBW: 89% Height (cm): 170.2 (09-07)  Weight (kg): 80 (09-10)  BMI (kg/m2): 27.6 (09-10)  IBW: 61.2 kg         % IBW: 118%            UBW: 88.9 kg             %UBW: 89%.   ? wt. accuracy of 72.6 kg vs. 80 kg on 09/10 daily wt. vs drug dosing wt.

## 2020-09-11 NOTE — DIETITIAN INITIAL EVALUATION ADULT. - PHYSICAL APPEARANCE
overweight/other (specify)/BMI=27.6(09/10), no edema noted Nutrition focused physical exam conducted; Subcutaneous fat Exam;  [ Mild  ]  Orbital fat pads region,  [ WNL  ]Buccal fat region,  [ WNL  ]triceps region, [ WNL  ]ribs region.  Muscle Exam; [ WNL  ]temples region, [ Mild  ]clavicle region, [ Mild   ]shoulder region, [ - ]Scapula region, [ Mild  ]Interosseous region, [ Unable  ]thigh region, [ Unable-pt c DVT preventive stocking  ]Calf region

## 2020-09-11 NOTE — PROGRESS NOTE ADULT - SUBJECTIVE AND OBJECTIVE BOX
CHIEF COMPLAINT: s/p exploratory laparotomy with bowel resection.   not passing gas.  no chest pain no sob  no fever no n/v      PHYSICAL EXAM:    GENERAL: moderately malnourished.   CHEST/LUNG: Clear to ausculation bilaterally, no wheezing, no crackles   HEART: Regular rate and rhythm; No murmurs, rubs  ABDOMEN: absent BS/ midline dressing over surgical wound.   EXTREMITIES:  Moving all four extremities spontaneously, No clubbing, cyanosis, or edema  NERVOUS SYSTEM:  Grossly non focal.  Psychiatry: AAO x 3, mood is appropriate       OBJECTIVE DATA:       Vital Signs Last 24 Hrs  T(C): 36.5 (11 Sep 2020 11:12), Max: 36.9 (11 Sep 2020 00:45)  T(F): 97.7 (11 Sep 2020 11:12), Max: 98.5 (11 Sep 2020 00:45)  HR: 57 (11 Sep 2020 11:12) (55 - 62)  BP: 136/66 (11 Sep 2020 11:12) (110/57 - 165/84)  BP(mean): --  RR: 17 (11 Sep 2020 11:12) (14 - 18)  SpO2: 100% (11 Sep 2020 11:12) (95% - 100%)           Daily     Daily Weight in k.2 (11 Sep 2020 10:03)  LABS:                        12.1   13.61 )-----------( 156      ( 11 Sep 2020 07:27 )             36.2             09-11    141  |  105  |  8   ----------------------------<  114<H>  4.0   |  32<H>  |  1.28    Ca    8.6      11 Sep 2020 07:27  Phos  4.1     09-11  Mg     2.1     09-11                         CAPILLARY BLOOD GLUCOSE      POCT Blood Glucose.: 71 mg/dL (10 Sep 2020 23:48)        MEDICATIONS  (STANDING):  cefoTEtan  IVPB 2 Gram(s) IV Intermittent every 12 hours  chlorhexidine 4% Liquid 1 Application(s) Topical <User Schedule>  enoxaparin Injectable 40 milliGRAM(s) SubCutaneous daily  HYDROmorphone PCA (1 mG/mL) 30 milliLiter(s) PCA Continuous PCA Continuous  metoclopramide Injectable 10 milliGRAM(s) IV Push every 8 hours  octreotide  Infusion 25 MICROgram(s)/Hr (5 mL/Hr) IV Continuous <Continuous>  pantoprazole  Injectable 40 milliGRAM(s) IV Push daily    MEDICATIONS  (PRN):  enalaprilat Injectable 2.5 milliGRAM(s) IV Push every 6 hours PRN hypertension with SBP > 120

## 2020-09-11 NOTE — PROGRESS NOTE ADULT - ASSESSMENT
61 yo female with PMHx breast CA diagnosed in 2017 (s/p bilateral mastectomy and breast reconstruction), abdominal carcinomatosis (s/p chemo/radiation 12/19 - 4/20 and hysterectomy 10/2019), HTN, GERD, sleep apnea p/w recurrent SBO.    Mechanical SBO. S/p laparotomy and bowel resection. cont NGT tube suctioning and NPO. start gentle hydration with dextose. follow electrolytes. follow surgery recs. local wound care and adequate pain control.   - Follow tissue pathology.     Hypokalemia. Repleted    hypophosphatemia. repleted.     HTN  -  controlled. cont increased enalapril with holding parameters. Monitor.     GERD  - c/w IV Protonix    Macrocytosis. Unremarkable vitamin B12 and folate level.     Moderate protein calorie malnutrition. nutritional consult recs appreciated.     HSQ per primary team.

## 2020-09-11 NOTE — PROGRESS NOTE ADULT - SUBJECTIVE AND OBJECTIVE BOX
Post-op check    S/P Diagnostic laparoscopy, ex lap, SHARIFA POD#0  60 year old Female seen and examined at bedside. Incisional pain well controlled with PCA. Tolerating NGT. Denies chest pain, shortness of breath, nausea/ vomiting, and dizziness.     Vital Signs Last 24 Hrs  T(F): 97.9 (09-11-20 @ 04:59), Max: 98.5 (09-11-20 @ 00:45)  HR: 59 (09-11-20 @ 04:59)  BP: 117/64 (09-11-20 @ 04:59)  RR: 17 (09-11-20 @ 04:59)  SpO2: 100% (09-11-20 @ 04:59)  POCT Blood Glucose.: 71 mg/dL (10 Sep 2020 23:48)    PE:  GENERAL: Alert, NAD  CHEST/LUNG: Clear to auscultation bilaterally, respirations nonlabored  HEART: S1S2, Regular rate and rhythm  ABDOMEN: Aquacel dressing C/D/I; Nondistended, hypoactive bowel sounds, soft, Appropriate incisional tenderness  EXTREMITIES:  no calf tenderness, No edema, intermittent compression devices in place bilaterally      Assessment: 59 y/o female with PMHx breast CA diagnosed in 2017 (s/p bilateral mastectomy and breast reconstruction), abdominal carcinomatosis (s/p chemo/radiation 12/19 - 4/20 and hysterectomy 10/2019), HTN, GERD, sleep apnea admitted with recurrent SBO, Malignant SBO protocol. S/P Diagnostic laparoscopy, ex lap, SHARIFA POD#0    Plan:  - NPO, NGT, IVF. Await bowel function.  - local wound care with aquacel   - DVT prophylaxis, Incentive Spirometer, OOB, Ambulating, pain control with PCA  - Post-op antibiotics   - berg  - Malignant SBO protocol: Octreotide and Reglan. No decadron.   - f/u labs   - continue current management per medicine and oncology   - will discuss with surgical attending

## 2020-09-11 NOTE — DIETITIAN INITIAL EVALUATION ADULT. - PERTINENT MEDS FT
MEDICATIONS  (STANDING):  cefoTEtan  IVPB 2 Gram(s) IV Intermittent every 12 hours  chlorhexidine 4% Liquid 1 Application(s) Topical <User Schedule>  enoxaparin Injectable 40 milliGRAM(s) SubCutaneous daily  HYDROmorphone PCA (1 mG/mL) 30 milliLiter(s) PCA Continuous PCA Continuous  metoclopramide Injectable 10 milliGRAM(s) IV Push every 8 hours  octreotide  Infusion 25 MICROgram(s)/Hr (5 mL/Hr) IV Continuous <Continuous>  pantoprazole  Injectable 40 milliGRAM(s) IV Push daily    MEDICATIONS  (PRN):  enalaprilat Injectable 2.5 milliGRAM(s) IV Push every 6 hours PRN hypertension with SBP > 120

## 2020-09-11 NOTE — DIETITIAN INITIAL EVALUATION ADULT. - FACTORS AFF FOOD INTAKE
pain/other (specify)/abdominal pain x 43 days, vomiting PTA abdominal pain x 3 days & vomiting PTA/other (specify)/pain

## 2020-09-11 NOTE — DIETITIAN INITIAL EVALUATION ADULT. - CONTINUE CURRENT NUTRITION CARE PLAN
advance diet when appropriate to clear fluids -> full liquids to Low fiber, Low sodium diet c Ensure Clear 8 oz 2x/day (480 nara, 16 gm pro)/yes

## 2020-09-11 NOTE — DIETITIAN INITIAL EVALUATION ADULT. - PERTINENT LABORATORY DATA
09-11 Na141 mmol/L Glu 114 mg/dL<H-s/p dexamethasone therapy > K+ 4.0 mmol/L Cr  1.28 mg/dL BUN 8 mg/dL 09-11 Phos 4.1 mg/dL 09-07 Alb 3.1 g/dL<L>09-07 ALT 31 U/L AST 18 U/L Alkaline Phosphatase 60 U/L

## 2020-09-11 NOTE — PROGRESS NOTE ADULT - ATTENDING COMMENTS
Pain well controlled. NGT functioning, no GI function yet.   Will continue with IVF resuscitation, ambulation, VTE chemoprophylaxis. Repeat BMP in evening to trend creatinine. Will keep Schneider catheter for now.    Spoke to patient about our intraoperative findings of extensive tumor burden, the release we were able to perform of her obstruction, and the high likelihood of recurrence of her malignant small bowel obstruction. She expressed understanding.

## 2020-09-11 NOTE — DIETITIAN INITIAL EVALUATION ADULT. - OTHER INFO
Pt lived c spouse and daughter, pt/daughter did the shopping/cooking.  Diet PTA: Low sodium   Pt reports wt. loss in the past year due to cancer, chemo and radiation therapy.  Pt is [employer] Eligible, referral given.  Pt s/p exploratory lap 09/10, c malignant  SBO. Pt lived c spouse and daughter, pt/daughter did the shopping/cooking.  Diet PTA: Low sodium, organic foods, no beef.   Pt reports wt. loss in the past year due to cancer, chemo and radiation therapy.  Pt is [employer] Eligible, referral given.  Pt s/p exploratory lap 09/10, c malignant  SBO.

## 2020-09-11 NOTE — PROGRESS NOTE ADULT - SUBJECTIVE AND OBJECTIVE BOX
s/p OR and SHARIFA     Vital Signs Last 24 Hrs  T(C): 36.5 (11 Sep 2020 11:12), Max: 36.9 (11 Sep 2020 00:45)  T(F): 97.7 (11 Sep 2020 11:12), Max: 98.5 (11 Sep 2020 00:45)  HR: 57 (11 Sep 2020 11:12) (55 - 62)  BP: 136/66 (11 Sep 2020 11:12) (110/57 - 165/84)  BP(mean): --  RR: 17 (11 Sep 2020 11:12) (14 - 18)  SpO2: 100% (11 Sep 2020 11:12) (95% - 100%)    PHYSICAL EXAM:    general - AAO x 3  HEENT - No Icterus, NGT +  CVS - RRR  RS - AE B/L  Abd - soft, NT  Ext - Pulses +        LABS:                        12.1   13.61 )-----------( 156      ( 11 Sep 2020 07:27 )             36.2     09-11    141  |  105  |  8   ----------------------------<  114<H>  4.0   |  32<H>  |  1.28    Ca    8.6      11 Sep 2020 07:27  Phos  4.1     09-11  Mg     2.1     09-11              RADIOLOGY & ADDITIONAL STUDIES:

## 2020-09-12 LAB
ALBUMIN SERPL ELPH-MCNC: 2.3 G/DL — LOW (ref 3.3–5)
ALP SERPL-CCNC: 51 U/L — SIGNIFICANT CHANGE UP (ref 40–120)
ALT FLD-CCNC: 16 U/L — SIGNIFICANT CHANGE UP (ref 12–78)
ANION GAP SERPL CALC-SCNC: 4 MMOL/L — LOW (ref 5–17)
AST SERPL-CCNC: 8 U/L — LOW (ref 15–37)
BILIRUB SERPL-MCNC: 0.7 MG/DL — SIGNIFICANT CHANGE UP (ref 0.2–1.2)
BUN SERPL-MCNC: 8 MG/DL — SIGNIFICANT CHANGE UP (ref 7–23)
CALCIUM SERPL-MCNC: 8.3 MG/DL — LOW (ref 8.5–10.1)
CHLORIDE SERPL-SCNC: 105 MMOL/L — SIGNIFICANT CHANGE UP (ref 96–108)
CO2 SERPL-SCNC: 31 MMOL/L — SIGNIFICANT CHANGE UP (ref 22–31)
CREAT SERPL-MCNC: 0.71 MG/DL — SIGNIFICANT CHANGE UP (ref 0.5–1.3)
GLUCOSE SERPL-MCNC: 67 MG/DL — LOW (ref 70–99)
HCT VFR BLD CALC: 34.4 % — LOW (ref 34.5–45)
HGB BLD-MCNC: 11.2 G/DL — LOW (ref 11.5–15.5)
MAGNESIUM SERPL-MCNC: 2.1 MG/DL — SIGNIFICANT CHANGE UP (ref 1.6–2.6)
MCHC RBC-ENTMCNC: 32.6 GM/DL — SIGNIFICANT CHANGE UP (ref 32–36)
MCHC RBC-ENTMCNC: 33 PG — SIGNIFICANT CHANGE UP (ref 27–34)
MCV RBC AUTO: 101.5 FL — HIGH (ref 80–100)
NRBC # BLD: 0 /100 WBCS — SIGNIFICANT CHANGE UP (ref 0–0)
PHOSPHATE SERPL-MCNC: 3.1 MG/DL — SIGNIFICANT CHANGE UP (ref 2.5–4.5)
PLATELET # BLD AUTO: 152 K/UL — SIGNIFICANT CHANGE UP (ref 150–400)
POTASSIUM SERPL-MCNC: 3.6 MMOL/L — SIGNIFICANT CHANGE UP (ref 3.5–5.3)
POTASSIUM SERPL-SCNC: 3.6 MMOL/L — SIGNIFICANT CHANGE UP (ref 3.5–5.3)
PROT SERPL-MCNC: 5.6 GM/DL — LOW (ref 6–8.3)
RBC # BLD: 3.39 M/UL — LOW (ref 3.8–5.2)
RBC # FLD: 14.3 % — SIGNIFICANT CHANGE UP (ref 10.3–14.5)
SODIUM SERPL-SCNC: 140 MMOL/L — SIGNIFICANT CHANGE UP (ref 135–145)
WBC # BLD: 7.04 K/UL — SIGNIFICANT CHANGE UP (ref 3.8–10.5)
WBC # FLD AUTO: 7.04 K/UL — SIGNIFICANT CHANGE UP (ref 3.8–10.5)

## 2020-09-12 PROCEDURE — 99232 SBSQ HOSP IP/OBS MODERATE 35: CPT

## 2020-09-12 RX ORDER — PANTOPRAZOLE SODIUM 20 MG/1
40 TABLET, DELAYED RELEASE ORAL DAILY
Refills: 0 | Status: DISCONTINUED | OUTPATIENT
Start: 2020-09-12 | End: 2020-09-12

## 2020-09-12 RX ADMIN — OCTREOTIDE ACETATE 5 MICROGRAM(S)/HR: 200 INJECTION, SOLUTION INTRAVENOUS; SUBCUTANEOUS at 14:39

## 2020-09-12 RX ADMIN — Medication 400 MILLIGRAM(S): at 08:20

## 2020-09-12 RX ADMIN — HEPARIN SODIUM 5000 UNIT(S): 5000 INJECTION INTRAVENOUS; SUBCUTANEOUS at 05:00

## 2020-09-12 RX ADMIN — HEPARIN SODIUM 5000 UNIT(S): 5000 INJECTION INTRAVENOUS; SUBCUTANEOUS at 14:39

## 2020-09-12 RX ADMIN — Medication 10 MILLIGRAM(S): at 05:00

## 2020-09-12 RX ADMIN — HEPARIN SODIUM 5000 UNIT(S): 5000 INJECTION INTRAVENOUS; SUBCUTANEOUS at 21:47

## 2020-09-12 RX ADMIN — Medication 10 MILLIGRAM(S): at 21:47

## 2020-09-12 RX ADMIN — Medication 10 MILLIGRAM(S): at 14:40

## 2020-09-12 RX ADMIN — Medication 1000 MILLIGRAM(S): at 08:35

## 2020-09-12 RX ADMIN — CHLORHEXIDINE GLUCONATE 1 APPLICATION(S): 213 SOLUTION TOPICAL at 05:00

## 2020-09-12 RX ADMIN — Medication 400 MILLIGRAM(S): at 01:00

## 2020-09-12 RX ADMIN — PANTOPRAZOLE SODIUM 40 MILLIGRAM(S): 20 TABLET, DELAYED RELEASE ORAL at 11:18

## 2020-09-12 NOTE — PROGRESS NOTE ADULT - SUBJECTIVE AND OBJECTIVE BOX
Postoperative Day # 2  s/p ex lap SHARIFA.  Pt seen with Dr. Cardenas    Patient seen and examined bedside resting comfortably.  Denies any flatus yet.  Abdominal pain is well controlled.  Denies nausea and vomiting  Denies chest pain, dyspnea, cough.    T(F): 98.4 (09-11-20 @ 16:40), Max: 98.4 (09-11-20 @ 16:40)  HR: 60 (09-11-20 @ 16:40) (57 - 60)  BP: 154/74 (09-11-20 @ 16:40) (136/66 - 154/74)  RR: 18 (09-11-20 @ 16:40) (17 - 18)  SpO2: 99% (09-11-20 @ 16:40) (99% - 100%)    PHYSICAL EXAM:  General: NAD  Neuro:  Alert & oriented x 3  Abdomen: softly Distended , hypoactive BS. Midline surgical dressing is clean & dry  : berg cath draining clear urine    LABS:                        11.2   7.04  )-----------( 152      ( 12 Sep 2020 08:07 )             34.4     09-12    140  |  105  |  8   ----------------------------<  67<L>  3.6   |  31  |  0.71    Ca    8.3<L>      12 Sep 2020 08:07  Phos  3.1     09-12  Mg     2.1     09-12    TPro  5.6<L>  /  Alb  2.3<L>  /  TBili  0.7  /  DBili  x   /  AST  8<L>  /  ALT  16  /  AlkPhos  51  09-12      I&O's Detail    11 Sep 2020 07:01  -  12 Sep 2020 07:00  --------------------------------------------------------  IN:    IV PiggyBack: 50 mL    IV PiggyBack: 100 mL    Lactated Ringers: 1500 mL    Octreotide: 100 mL  Total IN: 1750 mL    OUT:  Total OUT: 0 mL    Total NET: 1750 mL          Impression: 60y Female Postoperative Day # 2  s/p ex lap SHARIFA. for SBO        Plan:  -continue VTE prophylaxis   - continue medical f/u  -Increase activity with PT, OOB, Ambulate  -Patient instructed on and encouraged incentive spirometry use  -continue local wound care  -f/u AM labs  -per discussion with Dr. Yuental:  *TOV -> d/c Berg & do PVR  * Protonix

## 2020-09-12 NOTE — PROGRESS NOTE ADULT - SUBJECTIVE AND OBJECTIVE BOX
lying in bed    Vital Signs Last 24 Hrs  T(C): 36.7 (12 Sep 2020 10:31), Max: 36.9 (11 Sep 2020 16:40)  T(F): 98.1 (12 Sep 2020 10:31), Max: 98.4 (11 Sep 2020 16:40)  HR: 70 (12 Sep 2020 10:31) (60 - 70)  BP: 158/82 (12 Sep 2020 10:31) (154/74 - 158/82)  BP(mean): --  RR: 18 (12 Sep 2020 10:31) (18 - 18)  SpO2: 100% (12 Sep 2020 10:31) (99% - 100%)    PHYSICAL EXAM:    general - AAO x 3  HEENT - No Icterus, NGT +  CVS - RRR  RS - AE B/L  Abd - soft, NT  Ext - Pulses +        LABS:                        11.2   7.04  )-----------( 152      ( 12 Sep 2020 08:07 )             34.4     09-12    140  |  105  |  8   ----------------------------<  67<L>  3.6   |  31  |  0.71    Ca    8.3<L>      12 Sep 2020 08:07  Phos  3.1     09-12  Mg     2.1     09-12    TPro  5.6<L>  /  Alb  2.3<L>  /  TBili  0.7  /  DBili  x   /  AST  8<L>  /  ALT  16  /  AlkPhos  51  09-12            RADIOLOGY & ADDITIONAL STUDIES:

## 2020-09-12 NOTE — PROGRESS NOTE ADULT - ASSESSMENT
61 yo female with PMHx breast CA diagnosed in 2017 (s/p bilateral mastectomy and breast reconstruction), abdominal carcinomatosis (s/p chemo/radiation 12/19 - 4/20 and hysterectomy 10/2019), HTN, GERD, sleep apnea p/w recurrent SBO.    Mechanical SBO. S/p laparotomy and bowel resection. started on liquid diet. discussed with nurse to follow with surgery service about NGT suctioning/removal. cont to trend labs. cont gentle hydration and PCA pump for pain control.     Hypokalemia. Repleted    hypophosphatemia. repleted.     HTN  -  controlled. cont enalapril with holding parameters. Monitor.     GERD  - c/w IV Protonix    Macrocytosis. Unremarkable vitamin B12 and folate level.     Moderate protein calorie malnutrition. nutritional consult recs appreciated.     HSQ per primary team.

## 2020-09-12 NOTE — PROGRESS NOTE ADULT - SUBJECTIVE AND OBJECTIVE BOX
CHIEF COMPLAINT: s/p exploratory laparotomy with bowel resection.   not passing gas.  no chest pain no sob  no fever no n/v  not much NGT output       PHYSICAL EXAM:    GENERAL: moderately malnourished.   CHEST/LUNG: Clear to ausculation bilaterally, no wheezing, no crackles   HEART: Regular rate and rhythm; No murmurs, rubs  ABDOMEN: hypoactive BS/ midline dressing over surgical wound.   EXTREMITIES:  Moving all four extremities spontaneously, No clubbing, cyanosis, or edema  NERVOUS SYSTEM:  Grossly non focal.  Psychiatry: AAO x 3, mood is appropriate       OBJECTIVE DATA:       Vital Signs Last 24 Hrs  T(C): 36.7 (12 Sep 2020 10:31), Max: 36.9 (11 Sep 2020 16:40)  T(F): 98.1 (12 Sep 2020 10:31), Max: 98.4 (11 Sep 2020 16:40)  HR: 70 (12 Sep 2020 10:31) (60 - 70)  BP: 158/82 (12 Sep 2020 10:31) (154/74 - 158/82)  BP(mean): --  RR: 18 (12 Sep 2020 10:31) (18 - 18)  SpO2: 100% (12 Sep 2020 10:31) (99% - 100%)           Daily     Daily   LABS:                        11.2   7.04  )-----------( 152      ( 12 Sep 2020 08:07 )             34.4             09-12    140  |  105  |  8   ----------------------------<  67<L>  3.6   |  31  |  0.71    Ca    8.3<L>      12 Sep 2020 08:07  Phos  3.1     09-12  Mg     2.1     09-12    TPro  5.6<L>  /  Alb  2.3<L>  /  TBili  0.7  /  DBili  x   /  AST  8<L>  /  ALT  16  /  AlkPhos  51  09-12                       CAPILLARY BLOOD GLUCOSE      POCT Blood Glucose.: 71 mg/dL (10 Sep 2020 23:48)            MEDICATIONS  (STANDING):  chlorhexidine 4% Liquid 1 Application(s) Topical <User Schedule>  HYDROmorphone PCA (1 mG/mL) 30 milliLiter(s) PCA Continuous PCA Continuous  lactated ringers. 1000 milliLiter(s) (150 mL/Hr) IV Continuous <Continuous>  metoclopramide Injectable 10 milliGRAM(s) IV Push every 8 hours  octreotide  Infusion 25 MICROgram(s)/Hr (5 mL/Hr) IV Continuous <Continuous>  pantoprazole  Injectable 40 milliGRAM(s) IV Push daily    MEDICATIONS  (PRN):  acetaminophen   Tablet .. 650 milliGRAM(s) Oral every 6 hours PRN Temp greater or equal to 38C (100.4F)

## 2020-09-13 LAB
ALBUMIN SERPL ELPH-MCNC: 2.3 G/DL — LOW (ref 3.3–5)
ALP SERPL-CCNC: 57 U/L — SIGNIFICANT CHANGE UP (ref 40–120)
ALT FLD-CCNC: 18 U/L — SIGNIFICANT CHANGE UP (ref 12–78)
ANION GAP SERPL CALC-SCNC: 9 MMOL/L — SIGNIFICANT CHANGE UP (ref 5–17)
AST SERPL-CCNC: 17 U/L — SIGNIFICANT CHANGE UP (ref 15–37)
BILIRUB SERPL-MCNC: 0.7 MG/DL — SIGNIFICANT CHANGE UP (ref 0.2–1.2)
BUN SERPL-MCNC: 6 MG/DL — LOW (ref 7–23)
CALCIUM SERPL-MCNC: 8.6 MG/DL — SIGNIFICANT CHANGE UP (ref 8.5–10.1)
CHLORIDE SERPL-SCNC: 99 MMOL/L — SIGNIFICANT CHANGE UP (ref 96–108)
CO2 SERPL-SCNC: 29 MMOL/L — SIGNIFICANT CHANGE UP (ref 22–31)
CREAT SERPL-MCNC: 0.55 MG/DL — SIGNIFICANT CHANGE UP (ref 0.5–1.3)
GLUCOSE SERPL-MCNC: 70 MG/DL — SIGNIFICANT CHANGE UP (ref 70–99)
HCT VFR BLD CALC: 31.9 % — LOW (ref 34.5–45)
HGB BLD-MCNC: 10.5 G/DL — LOW (ref 11.5–15.5)
MAGNESIUM SERPL-MCNC: 2.1 MG/DL — SIGNIFICANT CHANGE UP (ref 1.6–2.6)
MCHC RBC-ENTMCNC: 32.9 GM/DL — SIGNIFICANT CHANGE UP (ref 32–36)
MCHC RBC-ENTMCNC: 33.3 PG — SIGNIFICANT CHANGE UP (ref 27–34)
MCV RBC AUTO: 101.3 FL — HIGH (ref 80–100)
NRBC # BLD: 0 /100 WBCS — SIGNIFICANT CHANGE UP (ref 0–0)
PHOSPHATE SERPL-MCNC: 2.2 MG/DL — LOW (ref 2.5–4.5)
PLATELET # BLD AUTO: 158 K/UL — SIGNIFICANT CHANGE UP (ref 150–400)
POTASSIUM SERPL-MCNC: 3.3 MMOL/L — LOW (ref 3.5–5.3)
POTASSIUM SERPL-SCNC: 3.3 MMOL/L — LOW (ref 3.5–5.3)
PROT SERPL-MCNC: 6.1 GM/DL — SIGNIFICANT CHANGE UP (ref 6–8.3)
RBC # BLD: 3.15 M/UL — LOW (ref 3.8–5.2)
RBC # FLD: 14.2 % — SIGNIFICANT CHANGE UP (ref 10.3–14.5)
SODIUM SERPL-SCNC: 137 MMOL/L — SIGNIFICANT CHANGE UP (ref 135–145)
WBC # BLD: 6.26 K/UL — SIGNIFICANT CHANGE UP (ref 3.8–10.5)
WBC # FLD AUTO: 6.26 K/UL — SIGNIFICANT CHANGE UP (ref 3.8–10.5)

## 2020-09-13 PROCEDURE — 99232 SBSQ HOSP IP/OBS MODERATE 35: CPT

## 2020-09-13 RX ORDER — DEXTROSE MONOHYDRATE, SODIUM CHLORIDE, AND POTASSIUM CHLORIDE 50; .745; 4.5 G/1000ML; G/1000ML; G/1000ML
1000 INJECTION, SOLUTION INTRAVENOUS
Refills: 0 | Status: DISCONTINUED | OUTPATIENT
Start: 2020-09-13 | End: 2020-09-16

## 2020-09-13 RX ORDER — ACETAMINOPHEN 500 MG
650 TABLET ORAL EVERY 6 HOURS
Refills: 0 | Status: DISCONTINUED | OUTPATIENT
Start: 2020-09-13 | End: 2020-09-14

## 2020-09-13 RX ORDER — POTASSIUM PHOSPHATE, MONOBASIC POTASSIUM PHOSPHATE, DIBASIC 236; 224 MG/ML; MG/ML
30 INJECTION, SOLUTION INTRAVENOUS ONCE
Refills: 0 | Status: COMPLETED | OUTPATIENT
Start: 2020-09-13 | End: 2020-09-13

## 2020-09-13 RX ADMIN — POTASSIUM PHOSPHATE, MONOBASIC POTASSIUM PHOSPHATE, DIBASIC 83.33 MILLIMOLE(S): 236; 224 INJECTION, SOLUTION INTRAVENOUS at 13:15

## 2020-09-13 RX ADMIN — Medication 650 MILLIGRAM(S): at 13:00

## 2020-09-13 RX ADMIN — HEPARIN SODIUM 5000 UNIT(S): 5000 INJECTION INTRAVENOUS; SUBCUTANEOUS at 13:08

## 2020-09-13 RX ADMIN — HEPARIN SODIUM 5000 UNIT(S): 5000 INJECTION INTRAVENOUS; SUBCUTANEOUS at 22:16

## 2020-09-13 RX ADMIN — PANTOPRAZOLE SODIUM 40 MILLIGRAM(S): 20 TABLET, DELAYED RELEASE ORAL at 12:32

## 2020-09-13 RX ADMIN — Medication 650 MILLIGRAM(S): at 12:30

## 2020-09-13 RX ADMIN — Medication 650 MILLIGRAM(S): at 18:46

## 2020-09-13 RX ADMIN — CHLORHEXIDINE GLUCONATE 1 APPLICATION(S): 213 SOLUTION TOPICAL at 05:16

## 2020-09-13 RX ADMIN — OCTREOTIDE ACETATE 5 MICROGRAM(S)/HR: 200 INJECTION, SOLUTION INTRAVENOUS; SUBCUTANEOUS at 08:27

## 2020-09-13 RX ADMIN — DEXTROSE MONOHYDRATE, SODIUM CHLORIDE, AND POTASSIUM CHLORIDE 125 MILLILITER(S): 50; .745; 4.5 INJECTION, SOLUTION INTRAVENOUS at 22:16

## 2020-09-13 RX ADMIN — HEPARIN SODIUM 5000 UNIT(S): 5000 INJECTION INTRAVENOUS; SUBCUTANEOUS at 05:14

## 2020-09-13 RX ADMIN — DEXTROSE MONOHYDRATE, SODIUM CHLORIDE, AND POTASSIUM CHLORIDE 125 MILLILITER(S): 50; .745; 4.5 INJECTION, SOLUTION INTRAVENOUS at 08:27

## 2020-09-13 NOTE — PHYSICAL THERAPY INITIAL EVALUATION ADULT - PERTINENT HX OF CURRENT PROBLEM, REHAB EVAL
Pt admitted for worsening abdominal pain of two days duration, described as severe, 12/10 pain, crampy, nonradiating periumbilical, and worse at night/with lying down.

## 2020-09-13 NOTE — PROGRESS NOTE ADULT - SUBJECTIVE AND OBJECTIVE BOX
Surgery NP note  Patient seen and examined bedside resting comfortably.  No complaints offered. Abdominal pain is well controlled.  Denies nausea and vomiting. NPO with NGT  No Flatus or BM.     T(F): 98.2 (09-13-20 @ 05:50), Max: 99.6 (09-13-20 @ 00:30)  HR: 76 (09-13-20 @ 05:50) (61 - 87)  BP: 160/81 (09-13-20 @ 05:50) (158/82 - 166/84)  RR: 18 (09-13-20 @ 05:50) (17 - 18)  SpO2: 100% (09-13-20 @ 05:50) (98% - 100%)  Wt(kg): --  CAPILLARY BLOOD GLUCOSE          PHYSICAL EXAM:  General: NAD, WDWN.   Neuro:  Alert & responsive  HEENT: NCAT, EOMI, conjunctiva clear  CV: +S1+S2 regular rate and rhythm  Lung: clear to ausculation bilaterally, respirations nonlabored, good inspiratory effort  Abdomen: soft, Incisional tenderness, No Distension. No BS  Extremities: no pedal edema or calf tenderness noted     LABS:    Pending      12 Sep 2020 07:01  -  13 Sep 2020 06:23  --------------------------------------------------------  IN:    Lactated Ringers: 1800 mL    Octreotide: 60 mL  Total IN: 1860 mL    OUT:    Indwelling Catheter - Urethral (mL): 750 mL    Nasogastric/Oral tube (mL): 300 mL  Total OUT: 1050 mL    Total NET: 810 mL    Assessment: 61 y/o female with PMHx breast CA diagnosed in 2017 (s/p bilateral mastectomy and breast reconstruction), abdominal carcinomatosis (s/p chemo/radiation 12/19 - 4/20 and hysterectomy 10/2019), HTN, GERD, sleep apnea admitted with recurrent SBO, Malignant SBO protocol. S/P Diagnostic laparoscopy, ex lap, SHARIFA POD#2    Plan:  - NPO, NGT, IVF. Await bowel function.  - local wound care with aquacel   - DVT prophylaxis, Incentive Spirometer, OOB, Ambulating, pain control with PCA  - Malignant SBO protocol: Octreotide and Reglan. No decadron.   - f/u labs   - continue current management per medicine and oncology   - will discuss with surgical attending

## 2020-09-13 NOTE — PHYSICAL THERAPY INITIAL EVALUATION ADULT - GAIT TRAINING, PT EVAL
Pt will ambulate 300ft independently s AD and with good balance in 2-5 days. Pt will negotiate 2 steps without rails independently in 2-5 days.

## 2020-09-13 NOTE — PHYSICAL THERAPY INITIAL EVALUATION ADULT - STRENGTHENING, PT EVAL
Pt will improve bilat. UE and LE muscle strength 1/2 grade within 4 week to improve safety and decrease risk of falls.

## 2020-09-13 NOTE — PHYSICAL THERAPY INITIAL EVALUATION ADULT - GENERAL OBSERVATIONS, REHAB EVAL
Pt encountered supine in bed, A&Ox4, +C/D/I abdominal dressing, +NG tube (disconnected prior to ambulation), +cardiac monitor, IV line and PCA, 4/10 abdominal pain, NAD and comfortable.

## 2020-09-13 NOTE — PROGRESS NOTE ADULT - SUBJECTIVE AND OBJECTIVE BOX
sitting up in bed    Vital Signs Last 24 Hrs  T(C): 37 (13 Sep 2020 11:20), Max: 37.6 (13 Sep 2020 00:30)  T(F): 98.6 (13 Sep 2020 11:20), Max: 99.6 (13 Sep 2020 00:30)  HR: 74 (13 Sep 2020 11:20) (72 - 87)  BP: 161/86 (13 Sep 2020 11:20) (160/81 - 166/84)  BP(mean): --  RR: 20 (13 Sep 2020 11:20) (17 - 20)  SpO2: 100% (13 Sep 2020 11:20) (98% - 100%)    PHYSICAL EXAM:    general - AAO x 3  HEENT - No Icterus, NGT +  CVS - RRR  RS - AE B/L  Abd - soft, NT  Ext - Pulses +        LABS:                        10.5   6.26  )-----------( 158      ( 13 Sep 2020 07:13 )             31.9     09-13    137  |  99  |  6<L>  ----------------------------<  70  3.3<L>   |  29  |  0.55    Ca    8.6      13 Sep 2020 07:13  Phos  2.2     09-13  Mg     2.1     09-13    TPro  6.1  /  Alb  2.3<L>  /  TBili  0.7  /  DBili  x   /  AST  17  /  ALT  18  /  AlkPhos  57  09-13            RADIOLOGY & ADDITIONAL STUDIES:

## 2020-09-13 NOTE — PHYSICAL THERAPY INITIAL EVALUATION ADULT - ADDITIONAL COMMENTS
As per pt, pt lives c daughter in a private house c 2 steps to enter the home without rails and no steps once inside the home. All amenities located on the main floor of the home. Bathroom has a tub/shower combination c retractable shower head, standard height toilet seat and no grab bars. Prior to admission pt states she was independent with all functional activities and ambulating without the use of assistive devices. No DME within the home. Pt is currently working as a caregiver, drives and right-handed. Pt wear glasses for reading and distance.

## 2020-09-13 NOTE — PROGRESS NOTE ADULT - SUBJECTIVE AND OBJECTIVE BOX
CHIEF COMPLAINT: s/p exploratory laparotomy with bowel resection.   not passing gas.  no chest pain no sob  no fever no n/v  NGT on suction.       PHYSICAL EXAM:    GENERAL: moderately malnourished.   CHEST/LUNG: Clear to ausculation bilaterally, no wheezing, no crackles   HEART: Regular rate and rhythm; No murmurs, rubs  ABDOMEN: hypoactive BS/ midline dressing over surgical wound. + NGT   EXTREMITIES:  Moving all four extremities spontaneously, No clubbing, cyanosis, or edema  NERVOUS SYSTEM:  Grossly non focal.  Psychiatry: AAO x 3, mood is appropriate       OBJECTIVE DATA:       Vital Signs Last 24 Hrs  T(C): 36.8 (13 Sep 2020 05:50), Max: 37.6 (13 Sep 2020 00:30)  T(F): 98.2 (13 Sep 2020 05:50), Max: 99.6 (13 Sep 2020 00:30)  HR: 76 (13 Sep 2020 05:50) (72 - 87)  BP: 160/81 (13 Sep 2020 05:50) (160/81 - 166/84)  BP(mean): --  RR: 18 (13 Sep 2020 05:50) (17 - 18)  SpO2: 100% (13 Sep 2020 05:50) (98% - 100%)           Daily     Daily Weight in k (13 Sep 2020 05:50)  LABS:                        10.5   6.26  )-----------( 158      ( 13 Sep 2020 07:13 )             31.9             09-13    137  |  99  |  6<L>  ----------------------------<  70  3.3<L>   |  29  |  0.55    Ca    8.6      13 Sep 2020 07:13  Phos  2.2     09-13  Mg     2.1     09-13    TPro  6.1  /  Alb  2.3<L>  /  TBili  0.7  /  DBili  x   /  AST  17  /  ALT  18  /  AlkPhos  57  09-13                       MEDICATIONS  (STANDING):  acetaminophen   Tablet .. 650 milliGRAM(s) Oral every 6 hours  chlorhexidine 4% Liquid 1 Application(s) Topical <User Schedule>  dextrose 5% + sodium chloride 0.45% with potassium chloride 20 mEq/L 1000 milliLiter(s) (125 mL/Hr) IV Continuous <Continuous>  HYDROmorphone PCA (1 mG/mL) 30 milliLiter(s) PCA Continuous PCA Continuous  octreotide  Infusion 25 MICROgram(s)/Hr (5 mL/Hr) IV Continuous <Continuous>  pantoprazole  Injectable 40 milliGRAM(s) IV Push daily  potassium phosphate IVPB 30 milliMole(s) IV Intermittent once    MEDICATIONS  (PRN):  enalaprilat Injectable 2.5 milliGRAM(s) IV Push every 6 hours PRN hypertension with SBP > 120

## 2020-09-14 LAB
ANION GAP SERPL CALC-SCNC: 6 MMOL/L — SIGNIFICANT CHANGE UP (ref 5–17)
BUN SERPL-MCNC: 2 MG/DL — LOW (ref 7–23)
CALCIUM SERPL-MCNC: 9 MG/DL — SIGNIFICANT CHANGE UP (ref 8.5–10.1)
CHLORIDE SERPL-SCNC: 102 MMOL/L — SIGNIFICANT CHANGE UP (ref 96–108)
CO2 SERPL-SCNC: 31 MMOL/L — SIGNIFICANT CHANGE UP (ref 22–31)
CREAT SERPL-MCNC: 0.61 MG/DL — SIGNIFICANT CHANGE UP (ref 0.5–1.3)
GLUCOSE SERPL-MCNC: 128 MG/DL — HIGH (ref 70–99)
HCT VFR BLD CALC: 34.1 % — LOW (ref 34.5–45)
HGB BLD-MCNC: 11.5 G/DL — SIGNIFICANT CHANGE UP (ref 11.5–15.5)
MAGNESIUM SERPL-MCNC: 2 MG/DL — SIGNIFICANT CHANGE UP (ref 1.6–2.6)
MCHC RBC-ENTMCNC: 33.6 PG — SIGNIFICANT CHANGE UP (ref 27–34)
MCHC RBC-ENTMCNC: 33.7 GM/DL — SIGNIFICANT CHANGE UP (ref 32–36)
MCV RBC AUTO: 99.7 FL — SIGNIFICANT CHANGE UP (ref 80–100)
NRBC # BLD: 0 /100 WBCS — SIGNIFICANT CHANGE UP (ref 0–0)
PHOSPHATE SERPL-MCNC: 1.7 MG/DL — LOW (ref 2.5–4.5)
PLATELET # BLD AUTO: 172 K/UL — SIGNIFICANT CHANGE UP (ref 150–400)
POTASSIUM SERPL-MCNC: 3.4 MMOL/L — LOW (ref 3.5–5.3)
POTASSIUM SERPL-SCNC: 3.4 MMOL/L — LOW (ref 3.5–5.3)
RBC # BLD: 3.42 M/UL — LOW (ref 3.8–5.2)
RBC # FLD: 14.2 % — SIGNIFICANT CHANGE UP (ref 10.3–14.5)
SODIUM SERPL-SCNC: 139 MMOL/L — SIGNIFICANT CHANGE UP (ref 135–145)
WBC # BLD: 4.57 K/UL — SIGNIFICANT CHANGE UP (ref 3.8–10.5)
WBC # FLD AUTO: 4.57 K/UL — SIGNIFICANT CHANGE UP (ref 3.8–10.5)

## 2020-09-14 PROCEDURE — 99232 SBSQ HOSP IP/OBS MODERATE 35: CPT

## 2020-09-14 RX ORDER — ACETAMINOPHEN 500 MG
1000 TABLET ORAL ONCE
Refills: 0 | Status: COMPLETED | OUTPATIENT
Start: 2020-09-14 | End: 2020-09-14

## 2020-09-14 RX ORDER — ACETAMINOPHEN 500 MG
1000 TABLET ORAL ONCE
Refills: 0 | Status: COMPLETED | OUTPATIENT
Start: 2020-09-15 | End: 2020-09-15

## 2020-09-14 RX ORDER — KETOROLAC TROMETHAMINE 30 MG/ML
15 SYRINGE (ML) INJECTION EVERY 6 HOURS
Refills: 0 | Status: DISCONTINUED | OUTPATIENT
Start: 2020-09-14 | End: 2020-09-16

## 2020-09-14 RX ORDER — KETOROLAC TROMETHAMINE 30 MG/ML
15 SYRINGE (ML) INJECTION EVERY 6 HOURS
Refills: 0 | Status: DISCONTINUED | OUTPATIENT
Start: 2020-09-14 | End: 2020-09-14

## 2020-09-14 RX ORDER — METOCLOPRAMIDE HCL 10 MG
10 TABLET ORAL EVERY 8 HOURS
Refills: 0 | Status: COMPLETED | OUTPATIENT
Start: 2020-09-14 | End: 2020-09-16

## 2020-09-14 RX ORDER — ACETAMINOPHEN 500 MG
1000 TABLET ORAL ONCE
Refills: 0 | Status: COMPLETED | OUTPATIENT
Start: 2020-09-15 | End: 2020-09-14

## 2020-09-14 RX ADMIN — Medication 650 MILLIGRAM(S): at 05:48

## 2020-09-14 RX ADMIN — HYDROMORPHONE HYDROCHLORIDE 30 MILLILITER(S): 2 INJECTION INTRAMUSCULAR; INTRAVENOUS; SUBCUTANEOUS at 07:18

## 2020-09-14 RX ADMIN — CHLORHEXIDINE GLUCONATE 1 APPLICATION(S): 213 SOLUTION TOPICAL at 12:52

## 2020-09-14 RX ADMIN — Medication 15 MILLIGRAM(S): at 18:18

## 2020-09-14 RX ADMIN — Medication 400 MILLIGRAM(S): at 18:18

## 2020-09-14 RX ADMIN — HEPARIN SODIUM 5000 UNIT(S): 5000 INJECTION INTRAVENOUS; SUBCUTANEOUS at 05:43

## 2020-09-14 RX ADMIN — PANTOPRAZOLE SODIUM 40 MILLIGRAM(S): 20 TABLET, DELAYED RELEASE ORAL at 12:51

## 2020-09-14 RX ADMIN — Medication 1000 MILLIGRAM(S): at 23:20

## 2020-09-14 RX ADMIN — HEPARIN SODIUM 5000 UNIT(S): 5000 INJECTION INTRAVENOUS; SUBCUTANEOUS at 21:43

## 2020-09-14 RX ADMIN — Medication 400 MILLIGRAM(S): at 23:16

## 2020-09-14 RX ADMIN — HEPARIN SODIUM 5000 UNIT(S): 5000 INJECTION INTRAVENOUS; SUBCUTANEOUS at 13:39

## 2020-09-14 RX ADMIN — Medication 15 MILLIGRAM(S): at 23:43

## 2020-09-14 RX ADMIN — Medication 650 MILLIGRAM(S): at 12:51

## 2020-09-14 RX ADMIN — OCTREOTIDE ACETATE 5 MICROGRAM(S)/HR: 200 INJECTION, SOLUTION INTRAVENOUS; SUBCUTANEOUS at 12:50

## 2020-09-14 RX ADMIN — DEXTROSE MONOHYDRATE, SODIUM CHLORIDE, AND POTASSIUM CHLORIDE 125 MILLILITER(S): 50; .745; 4.5 INJECTION, SOLUTION INTRAVENOUS at 14:28

## 2020-09-14 RX ADMIN — Medication 10 MILLIGRAM(S): at 21:44

## 2020-09-14 NOTE — PROGRESS NOTE ADULT - ASSESSMENT
61 yo female with PMHx breast CA diagnosed in 2017 (s/p bilateral mastectomy and breast reconstruction), abdominal carcinomatosis (s/p chemo/radiation 12/19 - 4/20 and hysterectomy 10/2019), HTN, GERD, sleep apnea p/w recurrent SBO.    Mechanical SBO. S/p laparotomy and bowel resection. abd pain improving. on PCA pump. surgery following and will decide about NGT removal and oral diet. Cont IVF meanwhile. follow labs.     Hypokalemia. Repleted  hypophosphatemia. repleted    HTN  -  controlled. cont enalapril with holding parameters. Monitor.     GERD  - c/w IV Protonix    Macrocytosis. Unremarkable vitamin B12 and folate level.     Moderate protein calorie malnutrition. nutritional consult recs appreciated.     HSQ per primary team.

## 2020-09-14 NOTE — PROGRESS NOTE ADULT - SUBJECTIVE AND OBJECTIVE BOX
CHIEF COMPLAINT: s/p exploratory laparotomy with bowel resection.   abd pain improving  no n/v  no passing gas or BM  no chest pain or sob  no fever    PHYSICAL EXAM:    GENERAL: moderately malnourished.   CHEST/LUNG: Clear to ausculation bilaterally, no wheezing, no crackles   HEART: Regular rate and rhythm; No murmurs, rubs  ABDOMEN: hypoactive BS/ midline dressing over surgical wound. + NGT   EXTREMITIES:  Moving all four extremities spontaneously, No clubbing, cyanosis, or edema  NERVOUS SYSTEM:  Grossly non focal.  Psychiatry: AAO x 3, mood is appropriate       OBJECTIVE DATA:       Vital Signs Last 24 Hrs  T(C): 37 (14 Sep 2020 05:14), Max: 37.2 (13 Sep 2020 18:42)  T(F): 98.6 (14 Sep 2020 05:14), Max: 98.9 (13 Sep 2020 18:42)  HR: 75 (14 Sep 2020 05:14) (66 - 76)  BP: 150/83 (14 Sep 2020 05:14) (150/83 - 165/86)  BP(mean): --  RR: 16 (14 Sep 2020 05:14) (16 - 20)  SpO2: 100% (14 Sep 2020 05:14) (97% - 100%)           Daily     Daily Weight in k.6 (14 Sep 2020 05:14)  LABS:                        10.5   6.26  )-----------( 158      ( 13 Sep 2020 07:13 )             31.9             09-13    137  |  99  |  6<L>  ----------------------------<  70  3.3<L>   |  29  |  0.55    Ca    8.6      13 Sep 2020 07:13  Phos  2.2     09-13  Mg     2.1     09-13    TPro  6.1  /  Alb  2.3<L>  /  TBili  0.7  /  DBili  x   /  AST  17  /  ALT  18  /  AlkPhos  57  09-13                       MEDICATIONS  (STANDING):  acetaminophen   Tablet .. 650 milliGRAM(s) Oral every 6 hours  chlorhexidine 4% Liquid 1 Application(s) Topical <User Schedule>  dextrose 5% + sodium chloride 0.45% with potassium chloride 20 mEq/L 1000 milliLiter(s) (125 mL/Hr) IV Continuous <Continuous>  HYDROmorphone PCA (1 mG/mL) 30 milliLiter(s) PCA Continuous PCA Continuous  octreotide  Infusion 25 MICROgram(s)/Hr (5 mL/Hr) IV Continuous <Continuous>  pantoprazole  Injectable 40 milliGRAM(s) IV Push daily    MEDICATIONS  (PRN):  enalaprilat Injectable 2.5 milliGRAM(s) IV Push every 6 hours PRN hypertension with SBP > 120

## 2020-09-14 NOTE — PROGRESS NOTE ADULT - ATTENDING COMMENTS
Patient ambulating, pain well controlled. No GI fxn yet. Not much output from NGT.  Encouraged ambulation. Advised patient to try chewing gum.  Discontinue PCA, start prn dilaudid 0.5 for moderate to severe pain. Otherwise acetaminophen/toradol around clock.  May need to consider parenteral nutrition in next couple of days if does not begin passing flatus.

## 2020-09-14 NOTE — PROGRESS NOTE ADULT - SUBJECTIVE AND OBJECTIVE BOX
Patient seen and examined at bedside in no acute distress.   Pt denies flatus or BM.  Says abd pain is much improved.  Reports not using the PCA pump at all overnight, only once this morning after coming back from the bathroom.      OOB/ambulating yesterday with PT.   Denies nausea, vomiting, fevers, chills.     Vital Signs Last 24 Hrs  T(F): 98.6 (09-14-20 @ 05:14), Max: 98.9 (09-13-20 @ 18:42)  HR: 75 (09-14-20 @ 05:14)  BP: 150/83 (09-14-20 @ 05:14)  RR: 16 (09-14-20 @ 05:14)  SpO2: 100% (09-14-20 @ 05:14)      GENERAL: Alert, NAD  HEENT: NGT to LWS, draining 450cc light brown fluid/24 hr  CHEST/LUNG: Respirations nonlabored  HEART: S1S2, Regular rate and rhythm  ABDOMEN: Aquacel dressing c/d/i, soft, appropriate incisional tenderness, nondistended, no rebound or guarding  EXTREMITIES:  no calf tenderness, No edema    I&O's Detail    13 Sep 2020 07:01  -  14 Sep 2020 07:00  --------------------------------------------------------  IN:    dextrose 5% + sodium chloride 0.45% w/ Additives: 2150 mL    Octreotide: 120 mL  Total IN: 2270 mL    OUT:    Nasogastric/Oral tube (mL): 450 mL  Total OUT: 450 mL    Total NET: 1820 mL          LABS:                        10.5   6.26  )-----------( 158      ( 13 Sep 2020 07:13 )             31.9     09-13    137  |  99  |  6<L>  ----------------------------<  70  3.3<L>   |  29  |  0.55    Ca    8.6      13 Sep 2020 07:13  Phos  2.2     09-13  Mg     2.1     09-13    TPro  6.1  /  Alb  2.3<L>  /  TBili  0.7  /  DBili  x   /  AST  17  /  ALT  18  /  AlkPhos  57  09-13      Assessment: 59 y/o female with PMHx breast CA diagnosed in 2017 (s/p bilateral mastectomy and breast reconstruction), abdominal carcinomatosis (s/p chemo/radiation 12/19 - 4/20 and hysterectomy 10/2019), HTN, GERD, sleep apnea admitted with recurrent SBO, Malignant SBO protocol. S/P Diagnostic laparoscopy, ex lap, SHARIFA POD#4.  Awaiting bowel function.     Plan  -NPO, IVF, monitor for bowel function.  -NGT to LWS.  -Pain management ?Consider dc PCA.   -F/u am labs.   -Continue Reglan/Octreotide.  -Medical/onc f/u.  -DVT prophylaxis, OOB/ambulating, incentive spirometry.   -Will discuss with surgical attending.         Patient seen and examined at bedside in no acute distress.   Pt denies flatus or BM.  Says abd pain is much improved.  Reports not using the PCA pump at all overnight, only once this morning after coming back from the bathroom.      OOB/ambulating yesterday with PT.   Denies nausea, vomiting, fevers, chills.     Vital Signs Last 24 Hrs  T(F): 98.6 (09-14-20 @ 05:14), Max: 98.9 (09-13-20 @ 18:42)  HR: 75 (09-14-20 @ 05:14)  BP: 150/83 (09-14-20 @ 05:14)  RR: 16 (09-14-20 @ 05:14)  SpO2: 100% (09-14-20 @ 05:14)      GENERAL: Alert, NAD  HEENT: NGT to LWS, draining 450cc light brown fluid/24 hr  CHEST/LUNG: Respirations nonlabored  HEART: S1S2, Regular rate and rhythm  ABDOMEN: Aquacel dressing c/d/i, soft, appropriate incisional tenderness, nondistended, no rebound or guarding  EXTREMITIES:  no calf tenderness, No edema    I&O's Detail    13 Sep 2020 07:01  -  14 Sep 2020 07:00  --------------------------------------------------------  IN:    dextrose 5% + sodium chloride 0.45% w/ Additives: 2150 mL    Octreotide: 120 mL  Total IN: 2270 mL    OUT:    Nasogastric/Oral tube (mL): 450 mL  Total OUT: 450 mL    Total NET: 1820 mL          LABS:                        10.5   6.26  )-----------( 158      ( 13 Sep 2020 07:13 )             31.9     09-13    137  |  99  |  6<L>  ----------------------------<  70  3.3<L>   |  29  |  0.55    Ca    8.6      13 Sep 2020 07:13  Phos  2.2     09-13  Mg     2.1     09-13    TPro  6.1  /  Alb  2.3<L>  /  TBili  0.7  /  DBili  x   /  AST  17  /  ALT  18  /  AlkPhos  57  09-13      Assessment: 61 y/o female with PMHx breast CA diagnosed in 2017 (s/p bilateral mastectomy and breast reconstruction), abdominal carcinomatosis (s/p chemo/radiation 12/19 - 4/20 and hysterectomy 10/2019), HTN, GERD, sleep apnea admitted with recurrent SBO, Malignant SBO protocol. S/P Diagnostic laparoscopy, ex lap, SHARIFA POD#4.  Awaiting bowel function.     Plan  -NPO, IVF, monitor for bowel function.  -NGT to LWS.  -Pain management ?Consider dc PCA.   -F/u am labs.   -Continue Reglan/Octreotide.  -Medical/onc f/u.  -DVT prophylaxis, OOB/ambulating, incentive spirometry.   -Pt seen and examined at bedside with Dr. Resendiz.

## 2020-09-14 NOTE — CHART NOTE - NSCHARTNOTEFT_GEN_A_CORE
Assessment:   Pt adm c SBO. S/p exploratory laparotomy (09/10), c NGT in place, surgery following and to decide about NGT removal and progression of diet to oral. PMH includes breast CA, abdominal carcinomatosis (s/p chemo/radiation), HTN, GERD, recurrent SBO   HTN  -  controlled. cont enalapril with holding parameters. Monitor.     GERD  - c/w IV Protonix    Macrocytosis. Unremarkable vitamin B12 and folate level.     Moderate protein calorie malnutrition. nutritional consult recs appreciated.     Factors impacting intake: [ ] none [ ] nausea  [ ] vomiting [ ] diarrhea [ ] constipation  [ ]chewing problems [ ] swallowing issues  [x ] other: altered GI function (as per above), s/p surgery, awaiting bowel function     Diet Presciption: Diet, NPO: x 7 days  Except Medications (09-10-20 @ 23:13)    Intake: 0%    Current Weight: Weight (kg): 09/14 (79.6kg), 09/07 (80kg), wt change (0.4kg)  % Weight Change: 0.5%  No edema noted     ·Physical Appearance/Skin Integrity  Nutrition focused physical exam conducted; Subcutaneous fat Exam;  [ Moderate ]  Orbital fat pads region,  [ WNL  ]Buccal fat region,  [ - ] triceps region, [ WNL  ]ribs region.  Muscle Exam; [ WNL  ]temples region, [ Mild  ]clavicle region, [ Mild   ]shoulder region, [ - ]Scapula region, [ Mild  ]Interosseous region, [ WNL  ]thigh region, [ Mild ]Calf region    Pertinent Medications: MEDICATIONS  (STANDING):  acetaminophen   Tablet .. 650 milliGRAM(s) Oral every 6 hours  chlorhexidine 4% Liquid 1 Application(s) Topical <User Schedule>  dextrose 5% + sodium chloride 0.45% with potassium chloride 20 mEq/L 1000 milliLiter(s) (125 mL/Hr) IV Continuous <Continuous> @125ml/hr = 255calories  HYDROmorphone PCA (1 mG/mL) 30 milliLiter(s) PCA Continuous PCA Continuous  octreotide  Infusion 25 MICROgram(s)/Hr (5 mL/Hr) IV Continuous <Continuous>  pantoprazole  Injectable 40 milliGRAM(s) IV Push daily    MEDICATIONS  (PRN):  enalaprilat Injectable 2.5 milliGRAM(s) IV Push every 6 hours PRN hypertension with SBP > 120    Pertinent Labs: 09-14 @ 10:40: Na 139, BUN 2<L>, Cr 0.61, <H>, K+ 3.4<L>, Phos 1.7<L>, Mg 2.0, Alk Phos --, ALT/SGPT --, AST/SGOT --, HbA1c --  Skin: WNL , except for surgical incision    Estimated Needs:   [x ] no change since previous assessment (9/11)  [ ] recalculated:     Previous Nutrition Diagnosis:   Malnutrition moderate malnutrition in context of acute illness.   Etiology inadequate protein-energy intake in setting of SBO.   Signs/Symptoms <75% nutrition needs >7 days, physical findings of mild muscle loss.   Goal/Expected Outcome diet progression c >75% of consumption of meals & supplement.      Nutrition Diagnosis is [x] ongoing  [ ] resolved [ ] not applicable     New Nutrition Diagnosis: [x ] not applicable       Interventions:   Recommend  [ ] Change Diet To:  [ ] Nutrition Supplement  [ X] Nutrition Support : if patient to remain NPO, recommend PPN c dextrose 75g, amino acids 64g @ 63ml/hr to dextrose 100g, amino acid 85g @ 84ml/hr + 20% lipids 250mL 3x per week = 680kcal (dextrose/AA) + 214kcal (lipids) = average 894kcal/day   [ X] Other: If PPN is initiated, recommend decrease/discontinued IVF    Monitoring and Evaluation:   [ ] PO intake [ x ] Tolerance to diet prescription [ x ] weights [ x ] labs; K, Na, Phosphorous, BUN/labs as per PPN protocol [ x ] follow up per protocol  [ ] other: Assessment:   Pt adm c SBO. S/p exploratory laparotomy (09/10), c NGT in place, surgery following and to decide about NGT removal and progression of diet to oral. PMH includes breast CA, abdominal carcinomatosis (s/p chemo/radiation), HTN, GERD, recurrent SBO   HTN  -  controlled. cont enalapril with holding parameters. Monitor.     GERD  - c/w IV Protonix    Macrocytosis. Unremarkable vitamin B12 and folate level.     Moderate protein calorie malnutrition. nutritional consult recs appreciated.     Factors impacting intake: [ ] none [ ] nausea  [ ] vomiting [ ] diarrhea [ ] constipation  [ ]chewing problems [ ] swallowing issues  [x ] other: altered GI function (as per above), s/p surgery, awaiting bowel function     Diet Presciption: Diet, NPO: x 7 days  Except Medications (09-10-20 @ 23:13)    Intake: 0%    Current Weight: Weight (kg): 09/14 (79.6kg), 09/07 (80kg), wt change (0.4kg)  % Weight Change: 0.5%  No edema noted     ·Physical Appearance/Skin Integrity  Nutrition focused physical exam conducted; Subcutaneous fat Exam;  [ Moderate ]  Orbital fat pads region,  [ WNL  ]Buccal fat region,  [ - ] triceps region, [ WNL  ]ribs region.  Muscle Exam; [ WNL  ]temples region, [ Mild  ]clavicle region, [ Mild   ]shoulder region, [ - ]Scapula region, [ Mild  ]Interosseous region, [ WNL  ]thigh region, [ Mild ]Calf region    Pertinent Medications: MEDICATIONS  (STANDING):  acetaminophen   Tablet .. 650 milliGRAM(s) Oral every 6 hours  chlorhexidine 4% Liquid 1 Application(s) Topical <User Schedule>  dextrose 5% + sodium chloride 0.45% with potassium chloride 20 mEq/L 1000 milliLiter(s) (125 mL/Hr) IV Continuous <Continuous> @125ml/hr = 255calories  HYDROmorphone PCA (1 mG/mL) 30 milliLiter(s) PCA Continuous PCA Continuous  octreotide  Infusion 25 MICROgram(s)/Hr (5 mL/Hr) IV Continuous <Continuous>  pantoprazole  Injectable 40 milliGRAM(s) IV Push daily    MEDICATIONS  (PRN):  enalaprilat Injectable 2.5 milliGRAM(s) IV Push every 6 hours PRN hypertension with SBP > 120    Pertinent Labs: 09-14 @ 10:40: Na 139, BUN 2<L>, Cr 0.61, <H>, K+ 3.4<L>, Phos 1.7<L>, Mg 2.0, Alk Phos --, ALT/SGPT --, AST/SGOT --, HbA1c --  Skin: WNL , except for surgical incision    Estimated Needs:   [x ] no change since previous assessment (9/11)  [ ] recalculated:     Previous Nutrition Diagnosis:   Malnutrition moderate malnutrition in context of acute illness.   Etiology inadequate protein-energy intake in setting of SBO.   Signs/Symptoms <75% nutrition needs >7 days, physical findings of mild muscle loss.   Goal/Expected Outcome diet progression c >75% of consumption of meals & supplement.      Nutrition Diagnosis is [x] ongoing  [ ] resolved [ ] not applicable     New Nutrition Diagnosis: [x ] not applicable       Interventions:   Recommend  [ ] Change Diet To:  [ ] Nutrition Supplement  [ X] Nutrition Support : if patient to remain NPO, recommend PPN c dextrose 75g, amino acids 64g @ 63ml/hr to dextrose 100g, amino acid 85g @ 84ml/hr + 20% lipids 250mL 3x per week = 680kcal (dextrose/AA) + 214kcal (lipids) = average 894kcal/day   [ X] Other: If PPN is initiated, recommend decrease/discontinued IVF    Monitoring and Evaluation:   [ ] PO intake [ x ] Tolerance to diet prescription [ x ] weights [ x ] labs; Potassium, Phosphorous, BUN/labs as per PPN protocol [ x ] follow up per protocol  [ ] other: Assessment:   Pt adm c SBO. S/p exploratory laparotomy (09/10), c NGT in place, surgery following and to decide about NGT removal and progression of diet to oral. PMH includes breast CA, abdominal carcinomatosis (s/p chemo/radiation), HTN, GERD, recurrent SBO     Patient educated on diet progression as per post-op protocol, diet education provided for low-fiber diet.     Factors impacting intake: [ ] none [ ] nausea  [ ] vomiting [ ] diarrhea [ ] constipation  [ ]chewing problems [ ] swallowing issues  [x ] other: altered GI function (as per above), s/p surgery, awaiting bowel function     Diet Presciption: Diet, NPO: x 7 days  Except Medications (09-10-20 @ 23:13)    Intake: 0%    Current Weight: Weight (kg): 09/14 (79.6kg), 09/07 (80kg), wt change (0.4kg)  % Weight Change: 0.5%  No edema noted     ·Physical Appearance/Skin Integrity  Nutrition focused physical exam conducted; Subcutaneous fat Exam;  [ Moderate ]  Orbital fat pads region,  [ WNL  ]Buccal fat region,  [ - ] triceps region, [ WNL  ]ribs region.  Muscle Exam; [ WNL  ]temples region, [ Mild  ]clavicle region, [ Mild   ]shoulder region, [ - ]Scapula region, [ Mild  ]Interosseous region, [ WNL  ]thigh region, [ Mild ]Calf region    Pertinent Medications: MEDICATIONS  (STANDING):  acetaminophen   Tablet .. 650 milliGRAM(s) Oral every 6 hours  chlorhexidine 4% Liquid 1 Application(s) Topical <User Schedule>  dextrose 5% + sodium chloride 0.45% with potassium chloride 20 mEq/L 1000 milliLiter(s) (125 mL/Hr) IV Continuous <Continuous> @125ml/hr = 255calories  HYDROmorphone PCA (1 mG/mL) 30 milliLiter(s) PCA Continuous PCA Continuous  octreotide  Infusion 25 MICROgram(s)/Hr (5 mL/Hr) IV Continuous <Continuous>  pantoprazole  Injectable 40 milliGRAM(s) IV Push daily    MEDICATIONS  (PRN):  enalaprilat Injectable 2.5 milliGRAM(s) IV Push every 6 hours PRN hypertension with SBP > 120    Pertinent Labs: 09-14 @ 10:40: Na 139, BUN 2<L>, Cr 0.61, <H>, K+ 3.4<L>, Phos 1.7<L>, Mg 2.0, Alk Phos --, ALT/SGPT --, AST/SGOT --, HbA1c --  Skin: WNL , except for surgical incision    Estimated Needs:   [x ] no change since previous assessment (9/11)  [ ] recalculated:     Previous Nutrition Diagnosis:   Malnutrition moderate malnutrition in context of acute illness.   Etiology inadequate protein-energy intake in setting of SBO.   Signs/Symptoms <75% nutrition needs >7 days, physical findings of mild muscle loss.   Goal/Expected Outcome diet progression c >75% of consumption of meals & supplement.      Nutrition Diagnosis is [x] ongoing  [ ] resolved [ ] not applicable     New Nutrition Diagnosis: [x ] not applicable       Interventions:   Recommend  [ X] Change Diet To: when appropriate, recommend advance diet as tolerated to clear liquids --> full liquids --> low-fiber, low-sodium diet  [ ] Nutrition Supplement  [ X] Nutrition Support : if patient to remain NPO, recommend PPN c dextrose 75g, amino acids 64g @ 63ml/hr to dextrose 100g, amino acid 85g @ 84ml/hr + 20% lipids 250mL 3x per week = 680kcal (dextrose/AA) + 214kcal (lipids) = average 894kcal/day   [ X] Other: If PPN is initiated, recommend decrease/discontinued IVF    Monitoring and Evaluation:   [ ] PO intake [ x ] Tolerance to diet prescription [ x ] weights [ x ] labs; Potassium, Phosphorous, BUN/labs as per PPN protocol [ x ] follow up per protocol  [ ] other: Assessment:   Pt adm c SBO. S/p exploratory laparotomy (09/10), c NGT in place, surgery following and to decide about NGT removal and progression of diet to oral. PMH includes breast CA, abdominal carcinomatosis (s/p chemo/radiation), HTN, GERD, recurrent SBO     Patient educated on diet progression as per post-op protocol, diet education provided for low-fiber diet.     Factors impacting intake: [ ] none [ ] nausea  [ ] vomiting [ ] diarrhea [ ] constipation  [ ]chewing problems [ ] swallowing issues  [x ] other: altered GI function (as per above), s/p surgery, awaiting bowel function     Diet Presciption: Diet, NPO: x 7 days  Except Medications (09-10-20 @ 23:13)    Intake: 0%    Current Weight: Weight (kg): 09/14 (79.6kg), 09/07 (80kg), wt change (0.4kg)  % Weight Change: 0.5%  No edema noted     ·Physical Appearance/Skin Integrity  Nutrition focused physical exam conducted; Subcutaneous fat Exam;  [ Moderate ]  Orbital fat pads region,  [ WNL  ]Buccal fat region,  [ - ] triceps region, [ WNL  ]ribs region.  Muscle Exam; [ WNL  ]temples region, [ Mild  ]clavicle region, [ Mild   ]shoulder region, [ - ]Scapula region, [ Mild  ]Interosseous region, [ WNL  ]thigh region, [ Mild ]Calf region    Pertinent Medications: MEDICATIONS  (STANDING):  acetaminophen   Tablet .. 650 milliGRAM(s) Oral every 6 hours  chlorhexidine 4% Liquid 1 Application(s) Topical <User Schedule>  dextrose 5% + sodium chloride 0.45% with potassium chloride 20 mEq/L 1000 milliLiter(s) (125 mL/Hr) IV Continuous <Continuous> @125ml/hr = 255calories  HYDROmorphone PCA (1 mG/mL) 30 milliLiter(s) PCA Continuous PCA Continuous  octreotide  Infusion 25 MICROgram(s)/Hr (5 mL/Hr) IV Continuous <Continuous>  pantoprazole  Injectable 40 milliGRAM(s) IV Push daily    MEDICATIONS  (PRN):  enalaprilat Injectable 2.5 milliGRAM(s) IV Push every 6 hours PRN hypertension with SBP > 120    Pertinent Labs: 09-14 @ 10:40: Na 139, BUN 2<L>, Cr 0.61, <H>, K+ 3.4<L>, Phos 1.7<L>, Mg 2.0, Alk Phos --, ALT/SGPT --, AST/SGOT --, HbA1c --  Skin: WNL , except for surgical incision    Estimated Needs:   [x ] no change since previous assessment (9/11)  [ ] recalculated:     Previous Nutrition Diagnosis:   Malnutrition moderate malnutrition in context of acute illness.   Etiology inadequate protein-energy intake in setting of SBO.   Signs/Symptoms <75% nutrition needs >7 days, physical findings of mild muscle loss.   Goal/Expected Outcome diet progression c >75% of consumption of meals & supplement.      Nutrition Diagnosis is [x] ongoing  [ ] resolved [ ] not applicable     New Nutrition Diagnosis: [x ] not applicable       Interventions:   Recommend  [ X] Change Diet To: when appropriate, recommend advance diet as tolerated to clear liquids --> full liquids --> low-fiber, low-sodium diet + ensure clear 8oz 2x/day = 480 nara, 16 gm pro  [ ] Nutrition Supplement  [ X] Nutrition Support : if patient to remain NPO, recommend PPN c dextrose 75g, amino acids 64g @ 63ml/hr to dextrose 100g, amino acid 85g @ 84ml/hr + 20% lipids 250mL 3x per week = 680kcal (dextrose/AA) + 214kcal (lipids) = average 894kcal/day   [ X] Other: If PPN is initiated, recommend decrease/discontinued IVF    Monitoring and Evaluation:   [ ] PO intake [ x ] Tolerance to diet prescription [ x ] weights [ x ] labs; Potassium, Phosphorous, BUN/labs as per PPN protocol [ x ] follow up per protocol  [ ] other:

## 2020-09-14 NOTE — PROGRESS NOTE ADULT - SUBJECTIVE AND OBJECTIVE BOX
lying in bed    Vital Signs Last 24 Hrs  T(C): 37 (14 Sep 2020 05:14), Max: 37.2 (13 Sep 2020 18:42)  T(F): 98.6 (14 Sep 2020 05:14), Max: 98.9 (13 Sep 2020 18:42)  HR: 75 (14 Sep 2020 05:14) (66 - 76)  BP: 150/83 (14 Sep 2020 05:14) (150/83 - 165/86)  BP(mean): --  RR: 16 (14 Sep 2020 05:14) (16 - 20)  SpO2: 100% (14 Sep 2020 05:14) (97% - 100%)    PHYSICAL EXAM:    general - AAO x 3  HEENT - No Icterus, NGT +  CVS - RRR  RS - AE B/L  Abd - soft, NT  Ext - Pulses +        LABS:                        10.5   6.26  )-----------( 158      ( 13 Sep 2020 07:13 )             31.9     09-13    137  |  99  |  6<L>  ----------------------------<  70  3.3<L>   |  29  |  0.55    Ca    8.6      13 Sep 2020 07:13  Phos  2.2     09-13  Mg     2.1     09-13    TPro  6.1  /  Alb  2.3<L>  /  TBili  0.7  /  DBili  x   /  AST  17  /  ALT  18  /  AlkPhos  57  09-13            RADIOLOGY & ADDITIONAL STUDIES:

## 2020-09-15 LAB
ANION GAP SERPL CALC-SCNC: 5 MMOL/L — SIGNIFICANT CHANGE UP (ref 5–17)
BUN SERPL-MCNC: 1 MG/DL — LOW (ref 7–23)
CALCIUM SERPL-MCNC: 8.6 MG/DL — SIGNIFICANT CHANGE UP (ref 8.5–10.1)
CHLORIDE SERPL-SCNC: 106 MMOL/L — SIGNIFICANT CHANGE UP (ref 96–108)
CO2 SERPL-SCNC: 30 MMOL/L — SIGNIFICANT CHANGE UP (ref 22–31)
CREAT SERPL-MCNC: 0.65 MG/DL — SIGNIFICANT CHANGE UP (ref 0.5–1.3)
GLUCOSE SERPL-MCNC: 128 MG/DL — HIGH (ref 70–99)
HCT VFR BLD CALC: 31 % — LOW (ref 34.5–45)
HGB BLD-MCNC: 10.5 G/DL — LOW (ref 11.5–15.5)
MAGNESIUM SERPL-MCNC: 2.3 MG/DL — SIGNIFICANT CHANGE UP (ref 1.6–2.6)
MCHC RBC-ENTMCNC: 33.8 PG — SIGNIFICANT CHANGE UP (ref 27–34)
MCHC RBC-ENTMCNC: 33.9 GM/DL — SIGNIFICANT CHANGE UP (ref 32–36)
MCV RBC AUTO: 99.7 FL — SIGNIFICANT CHANGE UP (ref 80–100)
NRBC # BLD: 0 /100 WBCS — SIGNIFICANT CHANGE UP (ref 0–0)
PHOSPHATE SERPL-MCNC: 2.2 MG/DL — LOW (ref 2.5–4.5)
PLATELET # BLD AUTO: 151 K/UL — SIGNIFICANT CHANGE UP (ref 150–400)
POTASSIUM SERPL-MCNC: 3.6 MMOL/L — SIGNIFICANT CHANGE UP (ref 3.5–5.3)
POTASSIUM SERPL-SCNC: 3.6 MMOL/L — SIGNIFICANT CHANGE UP (ref 3.5–5.3)
RBC # BLD: 3.11 M/UL — LOW (ref 3.8–5.2)
RBC # FLD: 14.4 % — SIGNIFICANT CHANGE UP (ref 10.3–14.5)
SODIUM SERPL-SCNC: 141 MMOL/L — SIGNIFICANT CHANGE UP (ref 135–145)
WBC # BLD: 4.22 K/UL — SIGNIFICANT CHANGE UP (ref 3.8–10.5)
WBC # FLD AUTO: 4.22 K/UL — SIGNIFICANT CHANGE UP (ref 3.8–10.5)

## 2020-09-15 PROCEDURE — 99232 SBSQ HOSP IP/OBS MODERATE 35: CPT

## 2020-09-15 RX ORDER — ACETAMINOPHEN 500 MG
1000 TABLET ORAL ONCE
Refills: 0 | Status: COMPLETED | OUTPATIENT
Start: 2020-09-15 | End: 2020-09-15

## 2020-09-15 RX ORDER — POTASSIUM PHOSPHATE, MONOBASIC POTASSIUM PHOSPHATE, DIBASIC 236; 224 MG/ML; MG/ML
15 INJECTION, SOLUTION INTRAVENOUS ONCE
Refills: 0 | Status: COMPLETED | OUTPATIENT
Start: 2020-09-15 | End: 2020-09-15

## 2020-09-15 RX ADMIN — HEPARIN SODIUM 5000 UNIT(S): 5000 INJECTION INTRAVENOUS; SUBCUTANEOUS at 05:40

## 2020-09-15 RX ADMIN — CHLORHEXIDINE GLUCONATE 1 APPLICATION(S): 213 SOLUTION TOPICAL at 10:42

## 2020-09-15 RX ADMIN — Medication 15 MILLIGRAM(S): at 00:54

## 2020-09-15 RX ADMIN — Medication 15 MILLIGRAM(S): at 14:20

## 2020-09-15 RX ADMIN — Medication 10 MILLIGRAM(S): at 13:42

## 2020-09-15 RX ADMIN — Medication 10 MILLIGRAM(S): at 21:36

## 2020-09-15 RX ADMIN — Medication 15 MILLIGRAM(S): at 06:09

## 2020-09-15 RX ADMIN — Medication 1000 MILLIGRAM(S): at 05:39

## 2020-09-15 RX ADMIN — HEPARIN SODIUM 5000 UNIT(S): 5000 INJECTION INTRAVENOUS; SUBCUTANEOUS at 21:36

## 2020-09-15 RX ADMIN — Medication 15 MILLIGRAM(S): at 19:55

## 2020-09-15 RX ADMIN — Medication 400 MILLIGRAM(S): at 05:39

## 2020-09-15 RX ADMIN — HEPARIN SODIUM 5000 UNIT(S): 5000 INJECTION INTRAVENOUS; SUBCUTANEOUS at 13:42

## 2020-09-15 RX ADMIN — DEXTROSE MONOHYDRATE, SODIUM CHLORIDE, AND POTASSIUM CHLORIDE 125 MILLILITER(S): 50; .745; 4.5 INJECTION, SOLUTION INTRAVENOUS at 05:39

## 2020-09-15 RX ADMIN — Medication 400 MILLIGRAM(S): at 21:36

## 2020-09-15 RX ADMIN — Medication 15 MILLIGRAM(S): at 05:39

## 2020-09-15 RX ADMIN — Medication 400 MILLIGRAM(S): at 13:42

## 2020-09-15 RX ADMIN — Medication 10 MILLIGRAM(S): at 05:39

## 2020-09-15 RX ADMIN — Medication 15 MILLIGRAM(S): at 13:42

## 2020-09-15 RX ADMIN — Medication 1000 MILLIGRAM(S): at 14:20

## 2020-09-15 RX ADMIN — POTASSIUM PHOSPHATE, MONOBASIC POTASSIUM PHOSPHATE, DIBASIC 62.5 MILLIMOLE(S): 236; 224 INJECTION, SOLUTION INTRAVENOUS at 10:41

## 2020-09-15 RX ADMIN — DEXTROSE MONOHYDRATE, SODIUM CHLORIDE, AND POTASSIUM CHLORIDE 125 MILLILITER(S): 50; .745; 4.5 INJECTION, SOLUTION INTRAVENOUS at 21:36

## 2020-09-15 RX ADMIN — PANTOPRAZOLE SODIUM 40 MILLIGRAM(S): 20 TABLET, DELAYED RELEASE ORAL at 11:01

## 2020-09-15 NOTE — CONSULT NOTE ADULT - SUBJECTIVE AND OBJECTIVE BOX
HPI:  61 y/o female with PMHx breast CA diagnosed in 2017 (s/p bilateral mastectomy and breast reconstruction), abdominal carcinomatosis (s/p chemo/radiation 12/19 - 4/20 and hysterectomy 10/2019), HTN, GERD, sleep apnea   now presents to the ED accompanied by her daughter c/o worsening abdominal pain of two days duration, described as severe, 12/10 pain, crampy, nonradiating periumbilical, and worse at night/with lying down. She is known to service and was recently discharged last week following conservative tx for SBO. She reports she vomited twice yesterday, NBNB. Pt admits she used miralax and an enema and had a BM this am. Pain is similar to last week. No nausea/vomiting at present. Patient denies fever, chills, diarrhea, melena, hematochezia, dysuria, hematuria, chest pain, shortness of breath, dizziness, cough. She also complains of back pain; ambulates independently at home. (07 Sep 2020 14:59)  ----------- As Above -------------------  Consult called for nutrition. Patient with history of SBO last week treated supportively presented with SBO. On 9/10/20 patient had lysis of adhesions. Unfortunately, patient has not been passing flatus / BM  NGT (+). Patient did pass flatus 2:20PM today  History of hysterectomy / carcinomatosis ( breast )   Last colonoscopy was 2016.      PAST MEDICAL & SURGICAL HISTORY:  Essential hypertension    Cancer of breast  Rt. Female    Sleep apnea  Use Oral Device    Obesity (BMI 30.0-34.9)    Seasonal allergies    Acid reflux    S/P hysterectomy    S/P breast reconstruction, bilateral    S/P bilateral mastectomy    Knee injury  Left &amp; had surgery about 30 years ago        MEDICATIONS  (STANDING):  acetaminophen  IVPB .. 1000 milliGRAM(s) IV Intermittent once  chlorhexidine 4% Liquid 1 Application(s) Topical <User Schedule>  dextrose 5% + sodium chloride 0.45% with potassium chloride 20 mEq/L 1000 milliLiter(s) (125 mL/Hr) IV Continuous <Continuous>  heparin   Injectable 5000 Unit(s) SubCutaneous every 8 hours  ketorolac   Injectable 15 milliGRAM(s) IV Push every 6 hours  metoclopramide Injectable 10 milliGRAM(s) IV Push every 8 hours  octreotide  Infusion 25 MICROgram(s)/Hr (5 mL/Hr) IV Continuous <Continuous>  pantoprazole  Injectable 40 milliGRAM(s) IV Push daily    MEDICATIONS  (PRN):  benzocaine 15 mG/menthol 3.6 mG (Sugar-Free) Lozenge 1 Lozenge Oral every 3 hours PRN Mouth Sores  enalaprilat Injectable 2.5 milliGRAM(s) IV Push every 6 hours PRN hypertension with SBP > 120      Allergies    No Known Allergies    Intolerances        FAMILY HISTORY:      REVIEW OF SYSTEMS:    CONSTITUTIONAL: No fever, weight loss, or fatigue  EYES: No eye pain, visual disturbances, or discharge  ENMT:  No difficulty hearing, tinnitus, vertigo; No sinus or throat pain  NECK: No pain or stiffness  BREASTS: No pain, masses, or nipple discharge  RESPIRATORY: No cough, wheezing, chills or hemoptysis; No shortness of breath  CARDIOVASCULAR: No chest pain, palpitations, dizziness, or leg swelling  GASTROINTESTINAL: See above  GENITOURINARY: No dysuria, frequency, hematuria, or incontinence  NEUROLOGICAL: No headaches, memory loss, , numbness, or tremors  SKIN: No itching, burning, rashes, or lesions   LYMPH NODES: No enlarged glands  ENDOCRINE: No heat or cold intolerance; No hair loss  MUSCULOSKELETAL: No joint pain or swelling; No muscle, back, or extremity pain  PSYCHIATRIC: No depression, anxiety, mood swings, or difficulty sleeping  HEME/LYMPH: No easy bruising, or bleeding gums  ALLERGY AND IMMUNOLOGIC: No hives or eczema          SOCIAL HISTORY:    FAMILY HISTORY:      Vital Signs Last 24 Hrs  T(C): 36.8 (15 Sep 2020 10:39), Max: 37.1 (15 Sep 2020 05:20)  T(F): 98.3 (15 Sep 2020 10:39), Max: 98.7 (15 Sep 2020 05:20)  HR: 76 (15 Sep 2020 10:39) (71 - 78)  BP: 154/82 (15 Sep 2020 10:39) (140/85 - 154/82)  BP(mean): --  RR: 18 (15 Sep 2020 10:39) (18 - 18)  SpO2: 96% (15 Sep 2020 10:39) (96% - 97%)    PHYSICAL EXAM:    GENERAL: NAD, well-groomed, well-developed  HEAD:  Atraumatic, Normocephalic  EYES: EOMI, PERRLA, conjunctiva and sclera clear  NECK: Supple, No JVD, Normal thyroid  NERVOUS SYSTEM:  Alert & Oriented X3, Good concentration;  CHEST/LUNG: Clear to percussion bilaterally; No rales, rhonchi, wheezing, or rubs  HEART: Regular rate and rhythm; No murmurs, rubs, or gallops  ABDOMEN: Soft, Nontender, Slightly distended; Bowel sounds diminished.  EXTREMITIES:  2+ Peripheral Pulses, No clubbing, cyanosis, or edema  LYMPH: No lymphadenopathy noted   RECTAL:  Deferred   SKIN: No rashes or lesions    LABS:                        10.5   4.22  )-----------( 151      ( 15 Sep 2020 07:41 )             31.0       CBC:  09-15 @ 07:41  WBC  4.22  HGB 10.5  HCT 31.0 Plate 151  MCV 99.7  09-14 @ 10:40  WBC  4.57  HGB 11.5  HCT 34.1 Plate 172  MCV 99.7  09-13 @ 07:13  WBC  6.26  HGB 10.5  HCT 31.9 Plate 158  .3  09-12 @ 08:07  WBC  7.04  HGB 11.2  HCT 34.4 Plate 152  .5  09-11 @ 07:27  WBC  13.61  HGB 12.1  HCT 36.2 Plate 156  .6  09-10 @ 04:30  WBC  5.84  HGB 10.9  HCT 33.6 Plate 165  .1  09-09 @ 09:58  WBC  7.19  HGB 12.1  HCT 37.0 Plate 201  .9           15 Sep 2020 07:41    141    |  106    |  1      ----------------------------<  128    3.6     |  30     |  0.65   14 Sep 2020 10:40    139    |  102    |  2      ----------------------------<  128    3.4     |  31     |  0.61   13 Sep 2020 07:13    137    |  99     |  6      ----------------------------<  70     3.3     |  29     |  0.55   12 Sep 2020 08:07    140    |  105    |  8      ----------------------------<  67     3.6     |  31     |  0.71   11 Sep 2020 17:32    139    |  102    |  8      ----------------------------<  95     3.8     |  32     |  0.84   11 Sep 2020 07:27    141    |  105    |  8      ----------------------------<  114    4.0     |  32     |  1.28   10 Sep 2020 04:30    141    |  104    |  8      ----------------------------<  132    3.3     |  34     |  0.73     Ca    8.6        15 Sep 2020 07:41  Ca    9.0        14 Sep 2020 10:40  Ca    8.6        13 Sep 2020 07:13  Ca    8.3        12 Sep 2020 08:07  Ca    8.4        11 Sep 2020 17:32  Ca    8.6        11 Sep 2020 07:27  Ca    8.5        10 Sep 2020 04:30  Phos  2.2       15 Sep 2020 07:41  Phos  1.7       14 Sep 2020 10:40  Phos  2.2       13 Sep 2020 07:13  Phos  3.1       12 Sep 2020 08:07  Phos  4.1       11 Sep 2020 07:27  Phos  2.7       10 Sep 2020 04:30  Phos  2.1       09 Sep 2020 09:58  Mg     2.3       15 Sep 2020 07:41  Mg     2.0       14 Sep 2020 10:40  Mg     2.1       13 Sep 2020 07:13  Mg     2.1       12 Sep 2020 08:07  Mg     2.1       11 Sep 2020 07:27  Mg     2.4       10 Sep 2020 04:30  Mg     2.5       09 Sep 2020 09:58    TPro  6.1    /  Alb  2.3    /  TBili  0.7    /  DBili  x      /  AST  17     /  ALT  18     /  AlkPhos  57     13 Sep 2020 07:13  TPro  5.6    /  Alb  2.3    /  TBili  0.7    /  DBili  x      /  AST  8      /  ALT  16     /  AlkPhos  51     12 Sep 2020 08:07            RADIOLOGY & ADDITIONAL STUDIES:

## 2020-09-15 NOTE — CONSULT NOTE ADULT - ASSESSMENT
HPI:  59 y/o female with PMHx breast CA diagnosed in 2017 (s/p bilateral mastectomy and breast reconstruction), abdominal carcinomatosis (s/p chemo/radiation 12/19 - 4/20 and hysterectomy 10/2019), HTN, GERD, sleep apnea   now presents to the ED accompanied by her daughter c/o worsening abdominal pain of two days duration, described as severe, 12/10 pain, crampy, nonradiating periumbilical, and worse at night/with lying down. She is known to service and was recently discharged last week following conservative tx for SBO. She reports she vomited twice yesterday, NBNB. Pt admits she used miralax and an enema and had a BM this am. Pain is similar to last week. No nausea/vomiting at present. Patient denies fever, chills, diarrhea, melena, hematochezia, dysuria, hematuria, chest pain, shortness of breath, dizziness, cough. She also complains of back pain; ambulates independently at home. (07 Sep 2020 14:59)  ----------- As Above -------------------  Consult called for nutrition. Patient with history of SBO last week treated supportively presented with SBO. On 9/10/20 patient had lysis of adhesions. Unfortunately, patient has not been passing flatus / BM  NGT (+). Patient did pass flatus 2:20PM today  History of hysterectomy / carcinomatosis ( breast )   Last colonoscopy was 2016.    SBO s/p lysis of adhesions with prolonged NPO status. Will arrange PPN/TPN starting tomorrow.

## 2020-09-15 NOTE — PROGRESS NOTE ADULT - ASSESSMENT
61 yo female with PMHx breast CA diagnosed in 2017 (s/p bilateral mastectomy and breast reconstruction), abdominal carcinomatosis (s/p chemo/radiation 12/19 - 4/20 and hysterectomy 10/2019), HTN, GERD, sleep apnea p/w recurrent SBO.    Mechanical SBO. S/p laparotomy and bowel resection. abd pain better. off PCA pump. surgery following. oral liquids and NGT removal per surgery. Follow labs.     Hypokalemia. Repleted    hypophosphatemia. low. replete again and recheck.     HTN  -  controlled. cont enalapril with holding parameters. Monitor.     GERD  - c/w IV Protonix    Macrocytosis. Unremarkable vitamin B12 and folate level.     Moderate protein calorie malnutrition. nutritional consult recs appreciated.

## 2020-09-15 NOTE — PROGRESS NOTE ADULT - ATTENDING COMMENTS
Ms. Mg status post enterolysis for SBO is due to Uterine Cancer still being treated. Ms. Mg status post enterolysis for SBO is due to Uterine Cancer still being treated. She has a moderate protein nutritional deficiency, will initiate TPN today. Ms. Mg status post enterolysis for SBO is due to Uterine Cancer still being treated. She has a moderate protein nutritional deficiency, will initiate TPN today. On exam her midline incision is well approximated. No drainage. Mildly distended. Nontender. NGT output remains high. She has no evidence of resumption of GI function. Encouraged that she be OOB/ambulating, may have ice chips.

## 2020-09-15 NOTE — PROGRESS NOTE ADULT - SUBJECTIVE AND OBJECTIVE BOX
INTERVAL HPI/OVERNIGHT EVENTS:  Pt. seen and examined at bedside resting comfortably. States she is feeling slightly better today, pain well controlled. NGT, NPO, denies N/V. Denies flatus or BM yet.   Ambulating often and using incentive spirometer.   Denies fever/chills, chest pain, dyspnea, cough, dizziness.     Vital Signs Last 24 Hrs  T(C): 37.1 (15 Sep 2020 05:20), Max: 37.1 (15 Sep 2020 05:20)  T(F): 98.7 (15 Sep 2020 05:20), Max: 98.7 (15 Sep 2020 05:20)  HR: 71 (15 Sep 2020 05:20) (71 - 103)  BP: 143/77 (15 Sep 2020 05:20) (140/85 - 162/88)  BP(mean): 119 (14 Sep 2020 16:19) (109 - 119)  RR: 18 (15 Sep 2020 05:20) (17 - 19)  SpO2: 96% (15 Sep 2020 05:20) (96% - 98%)    PHYSICAL EXAM:  GENERAL: Alert, NAD  HEENT: NGT to LWS, with minimal clear gastric output in cannister (500cc/24hr recorded)  CHEST/LUNG: Respirations nonlabored  HEART: S1S2, Regular rate and rhythm  ABDOMEN: Aquacel Dressing removed to find midline wound CDI with steri strips intact. Mild distention. Soft, appropriate incisional tenderness, no rebound or guarding  EXTREMITIES:  no calf tenderness, No edema    I&O's Detail    14 Sep 2020 07:01  -  15 Sep 2020 07:00  --------------------------------------------------------  IN:    dextrose 5% + sodium chloride 0.45% w/ Additives: 1250 mL    IV PiggyBack: 300 mL    IV PiggyBack: 60 mL    Octreotide: 1360 mL  Total IN: 2970 mL    OUT:    Nasogastric/Oral tube (mL): 500 mL  Total OUT: 500 mL    Total NET: 2470 mL      LABS:                        10.5   4.22  )-----------( 151      ( 15 Sep 2020 07:41 )             31.0     09-15    141  |  106  |  1<L>  ----------------------------<  128<H>  3.6   |  30  |  0.65    Ca    8.6      15 Sep 2020 07:41  Phos  2.2     09-15  Mg     2.3     09-15      Assessment: 61 y/o female with PMHx breast CA diagnosed in 2017 (s/p bilateral mastectomy and breast reconstruction), abdominal carcinomatosis (s/p chemo/radiation 12/19 - 4/20 and hysterectomy 10/2019), HTN, GERD, sleep apnea admitted with recurrent SBO, Malignant SBO protocol. S/P Diagnostic laparoscopy, ex lap, SHARIFA POD#5.  Awaiting bowel function.     Plan  -hypophosphatemia- repleted  -NPO, IVF, monitor for bowel function.  -NGT to LWS. Will d/c once passing gas  -GI consult for parenteral nutrition   -encourage OOB/Ambulation, PT  -Pain management PRN; Tylenol/Toradol ATC, limit narcotics   -Continue Reglan/Octreotide.  -Medical/onc f/u.  -DVT prophylaxis, incentive spirometer  -Will d/w Surgical attending

## 2020-09-15 NOTE — PROGRESS NOTE ADULT - SUBJECTIVE AND OBJECTIVE BOX
CHIEF COMPLAINT: s/p exploratory laparotomy with bowel resection.   Feeling better. No gas or BM  so far.   Abd pain improving.   no chest pain or sob       PHYSICAL EXAM:    GENERAL: moderately malnourished.   CHEST/LUNG: Clear to ausculation bilaterally, no wheezing, no crackles   HEART: Regular rate and rhythm; No murmurs, rubs  ABDOMEN: hypoactive BS/ midline dressing over surgical wound. + NGT   EXTREMITIES:  Moving all four extremities spontaneously, No clubbing, cyanosis, or edema  NERVOUS SYSTEM:  Grossly non focal.  Psychiatry: AAO x 3, mood is appropriate       OBJECTIVE DATA:       Vital Signs Last 24 Hrs  T(C): 36.8 (15 Sep 2020 10:39), Max: 37.1 (15 Sep 2020 05:20)  T(F): 98.3 (15 Sep 2020 10:39), Max: 98.7 (15 Sep 2020 05:20)  HR: 76 (15 Sep 2020 10:39) (71 - 103)  BP: 154/82 (15 Sep 2020 10:39) (140/85 - 162/88)  BP(mean): 119 (14 Sep 2020 16:19) (109 - 119)  RR: 18 (15 Sep 2020 10:39) (17 - 19)  SpO2: 96% (15 Sep 2020 10:39) (96% - 98%)           Daily     Daily Weight in k.6 (15 Sep 2020 05:20)  LABS:                        10.5   4.22  )-----------( 151      ( 15 Sep 2020 07:41 )             31.0             09-15    141  |  106  |  1<L>  ----------------------------<  128<H>  3.6   |  30  |  0.65    Ca    8.6      15 Sep 2020 07:41  Phos  2.2     09-15  Mg     2.3     09-15                    MEDICATIONS  (STANDING):  acetaminophen  IVPB .. 1000 milliGRAM(s) IV Intermittent once  acetaminophen  IVPB .. 1000 milliGRAM(s) IV Intermittent once  chlorhexidine 4% Liquid 1 Application(s) Topical <User Schedule>  dextrose 5% + sodium chloride 0.45% with potassium chloride 20 mEq/L 1000 milliLiter(s) (125 mL/Hr) IV Continuous <Continuous>  heparin   Injectable 5000 Unit(s) SubCutaneous every 8 hours  ketorolac   Injectable 15 milliGRAM(s) IV Push every 6 hours  metoclopramide Injectable 10 milliGRAM(s) IV Push every 8 hours  octreotide  Infusion 25 MICROgram(s)/Hr (5 mL/Hr) IV Continuous <Continuous>  pantoprazole  Injectable 40 milliGRAM(s) IV Push daily    MEDICATIONS  (PRN):  benzocaine 15 mG/menthol 3.6 mG (Sugar-Free) Lozenge 1 Lozenge Oral every 3 hours PRN Mouth Sores  enalaprilat Injectable 2.5 milliGRAM(s) IV Push every 6 hours PRN hypertension with SBP > 120

## 2020-09-15 NOTE — PROGRESS NOTE ADULT - SUBJECTIVE AND OBJECTIVE BOX
lying in bed    Vital Signs Last 24 Hrs  T(C): 37.1 (15 Sep 2020 05:20), Max: 37.1 (15 Sep 2020 05:20)  T(F): 98.7 (15 Sep 2020 05:20), Max: 98.7 (15 Sep 2020 05:20)  HR: 71 (15 Sep 2020 05:20) (71 - 103)  BP: 143/77 (15 Sep 2020 05:20) (140/85 - 162/88)  BP(mean): 119 (14 Sep 2020 16:19) (109 - 119)  RR: 18 (15 Sep 2020 05:20) (17 - 19)  SpO2: 96% (15 Sep 2020 05:20) (96% - 98%)    PHYSICAL EXAM:    general - AAO x 3  HEENT - No Icterus, NGT +  CVS - RRR  RS - AE B/L  Abd - soft, NT  Ext - Pulses +        LABS:                        10.5   4.22  )-----------( 151      ( 15 Sep 2020 07:41 )             31.0     09-15    141  |  106  |  1<L>  ----------------------------<  128<H>  3.6   |  30  |  0.65    Ca    8.6      15 Sep 2020 07:41  Phos  2.2     09-15  Mg     2.3     09-15              RADIOLOGY & ADDITIONAL STUDIES:

## 2020-09-16 LAB
ANION GAP SERPL CALC-SCNC: 4 MMOL/L — LOW (ref 5–17)
BUN SERPL-MCNC: 2 MG/DL — LOW (ref 7–23)
CALCIUM SERPL-MCNC: 9.1 MG/DL — SIGNIFICANT CHANGE UP (ref 8.5–10.1)
CHLORIDE SERPL-SCNC: 107 MMOL/L — SIGNIFICANT CHANGE UP (ref 96–108)
CO2 SERPL-SCNC: 30 MMOL/L — SIGNIFICANT CHANGE UP (ref 22–31)
CREAT SERPL-MCNC: 0.54 MG/DL — SIGNIFICANT CHANGE UP (ref 0.5–1.3)
GLUCOSE SERPL-MCNC: 114 MG/DL — HIGH (ref 70–99)
HCT VFR BLD CALC: 28.6 % — LOW (ref 34.5–45)
HGB BLD-MCNC: 9.1 G/DL — LOW (ref 11.5–15.5)
MAGNESIUM SERPL-MCNC: 2.1 MG/DL — SIGNIFICANT CHANGE UP (ref 1.6–2.6)
MCHC RBC-ENTMCNC: 31.8 GM/DL — LOW (ref 32–36)
MCHC RBC-ENTMCNC: 33.6 PG — SIGNIFICANT CHANGE UP (ref 27–34)
MCV RBC AUTO: 105.5 FL — HIGH (ref 80–100)
NRBC # BLD: 0 /100 WBCS — SIGNIFICANT CHANGE UP (ref 0–0)
PHOSPHATE SERPL-MCNC: 2.7 MG/DL — SIGNIFICANT CHANGE UP (ref 2.5–4.5)
PLATELET # BLD AUTO: 148 K/UL — LOW (ref 150–400)
POTASSIUM SERPL-MCNC: 3.8 MMOL/L — SIGNIFICANT CHANGE UP (ref 3.5–5.3)
POTASSIUM SERPL-SCNC: 3.8 MMOL/L — SIGNIFICANT CHANGE UP (ref 3.5–5.3)
RBC # BLD: 2.71 M/UL — LOW (ref 3.8–5.2)
RBC # FLD: 14.6 % — HIGH (ref 10.3–14.5)
SODIUM SERPL-SCNC: 141 MMOL/L — SIGNIFICANT CHANGE UP (ref 135–145)
WBC # BLD: 3.64 K/UL — LOW (ref 3.8–10.5)
WBC # FLD AUTO: 3.64 K/UL — LOW (ref 3.8–10.5)

## 2020-09-16 PROCEDURE — 99232 SBSQ HOSP IP/OBS MODERATE 35: CPT

## 2020-09-16 RX ORDER — ACETAMINOPHEN 500 MG
1000 TABLET ORAL ONCE
Refills: 0 | Status: COMPLETED | OUTPATIENT
Start: 2020-09-16 | End: 2020-09-16

## 2020-09-16 RX ORDER — I.V. FAT EMULSION 20 G/100ML
1.25 EMULSION INTRAVENOUS
Qty: 100 | Refills: 0 | Status: DISCONTINUED | OUTPATIENT
Start: 2020-09-16 | End: 2020-09-16

## 2020-09-16 RX ORDER — KETOROLAC TROMETHAMINE 30 MG/ML
15 SYRINGE (ML) INJECTION EVERY 6 HOURS
Refills: 0 | Status: DISCONTINUED | OUTPATIENT
Start: 2020-09-16 | End: 2020-09-20

## 2020-09-16 RX ORDER — ELECTROLYTE SOLUTION,INJ
1 VIAL (ML) INTRAVENOUS
Refills: 0 | Status: DISCONTINUED | OUTPATIENT
Start: 2020-09-16 | End: 2020-09-16

## 2020-09-16 RX ORDER — I.V. FAT EMULSION 20 G/100ML
0.62 EMULSION INTRAVENOUS
Qty: 50 | Refills: 0 | Status: DISCONTINUED | OUTPATIENT
Start: 2020-09-16 | End: 2020-09-16

## 2020-09-16 RX ORDER — DEXTROSE MONOHYDRATE, SODIUM CHLORIDE, AND POTASSIUM CHLORIDE 50; .745; 4.5 G/1000ML; G/1000ML; G/1000ML
1000 INJECTION, SOLUTION INTRAVENOUS
Refills: 0 | Status: ACTIVE | OUTPATIENT
Start: 2020-09-16 | End: 2020-09-16

## 2020-09-16 RX ORDER — SODIUM CHLORIDE 9 MG/ML
1000 INJECTION, SOLUTION INTRAVENOUS
Refills: 0 | Status: DISCONTINUED | OUTPATIENT
Start: 2020-09-16 | End: 2020-09-21

## 2020-09-16 RX ADMIN — OCTREOTIDE ACETATE 5 MICROGRAM(S)/HR: 200 INJECTION, SOLUTION INTRAVENOUS; SUBCUTANEOUS at 20:52

## 2020-09-16 RX ADMIN — SODIUM CHLORIDE 80 MILLILITER(S): 9 INJECTION, SOLUTION INTRAVENOUS at 22:26

## 2020-09-16 RX ADMIN — DEXTROSE MONOHYDRATE, SODIUM CHLORIDE, AND POTASSIUM CHLORIDE 125 MILLILITER(S): 50; .745; 4.5 INJECTION, SOLUTION INTRAVENOUS at 17:26

## 2020-09-16 RX ADMIN — OCTREOTIDE ACETATE 5 MICROGRAM(S)/HR: 200 INJECTION, SOLUTION INTRAVENOUS; SUBCUTANEOUS at 01:34

## 2020-09-16 RX ADMIN — HEPARIN SODIUM 5000 UNIT(S): 5000 INJECTION INTRAVENOUS; SUBCUTANEOUS at 14:39

## 2020-09-16 RX ADMIN — PANTOPRAZOLE SODIUM 40 MILLIGRAM(S): 20 TABLET, DELAYED RELEASE ORAL at 11:32

## 2020-09-16 RX ADMIN — Medication 1000 MILLIGRAM(S): at 09:05

## 2020-09-16 RX ADMIN — Medication 1 EACH: at 22:10

## 2020-09-16 RX ADMIN — HEPARIN SODIUM 5000 UNIT(S): 5000 INJECTION INTRAVENOUS; SUBCUTANEOUS at 21:13

## 2020-09-16 RX ADMIN — Medication 400 MILLIGRAM(S): at 08:48

## 2020-09-16 RX ADMIN — CHLORHEXIDINE GLUCONATE 1 APPLICATION(S): 213 SOLUTION TOPICAL at 06:19

## 2020-09-16 RX ADMIN — I.V. FAT EMULSION 10.4 GM/KG/DAY: 20 EMULSION INTRAVENOUS at 17:26

## 2020-09-16 RX ADMIN — HEPARIN SODIUM 5000 UNIT(S): 5000 INJECTION INTRAVENOUS; SUBCUTANEOUS at 06:20

## 2020-09-16 RX ADMIN — Medication 400 MILLIGRAM(S): at 23:23

## 2020-09-16 NOTE — PROGRESS NOTE ADULT - ASSESSMENT
HPI:  59 y/o female with PMHx breast CA diagnosed in 2017 (s/p bilateral mastectomy and breast reconstruction), abdominal carcinomatosis (s/p chemo/radiation 12/19 - 4/20 and hysterectomy 10/2019), HTN, GERD, sleep apnea   now presents to the ED accompanied by her daughter c/o worsening abdominal pain of two days duration, described as severe, 12/10 pain, crampy, nonradiating periumbilical, and worse at night/with lying down. She is known to service and was recently discharged last week following conservative tx for SBO. She reports she vomited twice yesterday, NBNB. Pt admits she used miralax and an enema and had a BM this am. Pain is similar to last week. No nausea/vomiting at present. Patient denies fever, chills, diarrhea, melena, hematochezia, dysuria, hematuria, chest pain, shortness of breath, dizziness, cough. She also complains of back pain; ambulates independently at home. (07 Sep 2020 14:59)  ----------- As Above -------------------  Consult called for nutrition. Patient with history of SBO last week treated supportively presented with SBO. On 9/10/20 patient had lysis of adhesions. Unfortunately, patient has not been passing flatus / BM  NGT (+). Patient did pass flatus 2:20PM today  History of hysterectomy / carcinomatosis ( breast )   Last colonoscopy was 2016.    SBO s/p lysis of adhesions with prolonged NPO status. NGT removed. Will arrange PPN for today

## 2020-09-16 NOTE — MEDICAL STUDENT PROGRESS NOTE(EDUCATION) - SUBJECTIVE AND OBJECTIVE BOX
GENERAL SURGERY PROGRESS NOTE    Patient: ERLIN SCHMIDT , 60y ([birth year])Female   MRN: [id number]  Location: [facility] [address]  Visit: 09-07-20 Inpatient  Date: 09-16-20 @ 06:28    Hospital Day #:  Post-Op Day #:    Procedure/Dx/Injuries:    Events of past 24 hours:    PAST MEDICAL & SURGICAL HISTORY:  Essential hypertension    Cancer of breast  Rt. Female    Sleep apnea  Use Oral Device    Obesity (BMI 30.0-34.9)    Seasonal allergies    Acid reflux    S/P hysterectomy    S/P breast reconstruction, bilateral    S/P bilateral mastectomy    Knee injury  Left &amp; had surgery about 30 years ago        Vitals: T(F): 98 (09-16-20 @ 05:35), Max: 98.6 (09-15-20 @ 23:51)  HR: 67 (09-16-20 @ 05:35)  BP: 162/92 (09-16-20 @ 05:35)  RR: 18 (09-16-20 @ 05:35)  SpO2: 96% (09-16-20 @ 05:35)      Pain (0-10):            Pain Control Adequate: [] YES [] N    Diet, NPO:   Except Medications      09-14-20 @ 07:01  -  09-15-20 @ 07:00  --------------------------------------------------------  IN:    dextrose 5% + sodium chloride 0.45% w/ Additives: 1250 mL    IV PiggyBack: 300 mL    IV PiggyBack: 60 mL    Octreotide: 1360 mL  Total IN: 2970 mL    OUT:    Nasogastric/Oral tube (mL): 500 mL  Total OUT: 500 mL    Total NET: 2470 mL        Bowel Movement: : [] YES [] NO  Flatus: : [] YES [] NO    PHYSICAL EXAM  GEN:  CV:  LUNGS:  ABD:  EXT:  WOUND:  INCISION:    MEDICATIONS  (STANDING):  acetaminophen  IVPB .. 1000 milliGRAM(s) IV Intermittent once  chlorhexidine 4% Liquid 1 Application(s) Topical <User Schedule>  dextrose 5% + sodium chloride 0.45% with potassium chloride 20 mEq/L 1000 milliLiter(s) (125 mL/Hr) IV Continuous <Continuous>  heparin   Injectable 5000 Unit(s) SubCutaneous every 8 hours  ketorolac   Injectable 15 milliGRAM(s) IV Push every 6 hours  octreotide  Infusion 25 MICROgram(s)/Hr (5 mL/Hr) IV Continuous <Continuous>  pantoprazole  Injectable 40 milliGRAM(s) IV Push daily    MEDICATIONS  (PRN):  benzocaine 15 mG/menthol 3.6 mG (Sugar-Free) Lozenge 1 Lozenge Oral every 3 hours PRN Mouth Sores  enalaprilat Injectable 2.5 milliGRAM(s) IV Push every 6 hours PRN hypertension with SBP > 120      DVT PROPHYLAXIS: [] YES [] NO   GI PROPHYLAXIS: [] YES [] NO   ANTICOAGULATION: [] YES [] NO   ANTIBIOTICS: [] YES [] NO       LAB/STUDIES:  CAPILLARY BLOOD GLUCOSE                              10.5   4.22  )-----------( 151      ( 15 Sep 2020 07:41 )             31.0     09-15    141  |  106  |  1<L>  ----------------------------<  128<H>  3.6   |  30  |  0.65    Ca    8.6      15 Sep 2020 07:41  Phos  2.2     09-15  Mg     2.3     09-15                    IMAGING:    Assessment:  60yFemale patient admitted with .    Plan:      Date/Time: 09-16-20 @ 06:28   GENERAL SURGERY PROGRESS NOTE    Patient: ERLIN SCHMIDT , 60y ([birth year])Female   MRN: [id number]  Location: [facility] [address]  Visit: 09-07-20 Inpatient  Date: 09-16-20 @ 06:28    Hospital Day #:  Post-Op Day #: 6    Procedure/Dx/Injuries:    Events of past 24 hours:  Pt seen at beside and examined, states she is feeling better and is passing flatus but does not report a bowel movement yet today. Denies n/v. reports 3-4/10 abd pain throughout.    PAST MEDICAL & SURGICAL HISTORY:  Essential hypertension    Cancer of breast  Rt. Female  Sleep apnea  Use Oral Device  Obesity (BMI 30.0-34.9)  Seasonal allergies  Acid reflux  S/P hysterectomy  S/P breast reconstruction, bilatera  S/P bilateral mastectomy  Knee injury  Left &amp; had surgery about 30 years ago        Vitals: T(F): 98 (09-16-20 @ 05:35), Max: 98.6 (09-15-20 @ 23:51)  HR: 67 (09-16-20 @ 05:35)  BP: 162/92 (09-16-20 @ 05:35)  RR: 18 (09-16-20 @ 05:35)  SpO2: 96% (09-16-20 @ 05:35)      Pain (0-10):            Pain Control Adequate: [] YES [] N    Diet, NPO:   Except Medications      09-14-20 @ 07:01  -  09-15-20 @ 07:00  --------------------------------------------------------  IN:    dextrose 5% + sodium chloride 0.45% w/ Additives: 1250 mL    IV PiggyBack: 300 mL    IV PiggyBack: 60 mL    Octreotide: 1360 mL  Total IN: 2970 mL    OUT:    Nasogastric/Oral tube (mL): 500 mL  Total OUT: 500 mL    Total NET: 2470 mL        Bowel Movement: :NO  Flatus: :YES  PHYSICAL EXAM  GEN: NAD, walking around the room  CV: RRR no murmur  LUNGS: CTA bilaterally  ABD: soft abdomen, mildly distended. bowel sounds heard. midline incision is dry and aprroximated.  EXT: FROM  WOUND:   INCISION: clean and dry    MEDICATIONS  (STANDING):  acetaminophen  IVPB .. 1000 milliGRAM(s) IV Intermittent once  chlorhexidine 4% Liquid 1 Application(s) Topical <User Schedule>  dextrose 5% + sodium chloride 0.45% with potassium chloride 20 mEq/L 1000 milliLiter(s) (125 mL/Hr) IV Continuous <Continuous>  heparin   Injectable 5000 Unit(s) SubCutaneous every 8 hours  ketorolac   Injectable 15 milliGRAM(s) IV Push every 6 hours  octreotide  Infusion 25 MICROgram(s)/Hr (5 mL/Hr) IV Continuous <Continuous>  pantoprazole  Injectable 40 milliGRAM(s) IV Push daily    MEDICATIONS  (PRN):  benzocaine 15 mG/menthol 3.6 mG (Sugar-Free) Lozenge 1 Lozenge Oral every 3 hours PRN Mouth Sores  enalaprilat Injectable 2.5 milliGRAM(s) IV Push every 6 hours PRN hypertension with SBP > 120      DVT PROPHYLAXIS: [] YES [] NO   GI PROPHYLAXIS: [] YES [] NO   ANTICOAGULATION: [] YES [] NO   ANTIBIOTICS: [] YES [] NO       LAB/STUDIES:  CAPILLARY BLOOD GLUCOSE                              10.5   4.22  )-----------( 151      ( 15 Sep 2020 07:41 )             31.0     09-15    141  |  106  |  1<L>  ----------------------------<  128<H>  3.6   |  30  |  0.65    Ca    8.6      15 Sep 2020 07:41  Phos  2.2     09-15  Mg     2.3     09-15                    IMAGING:    Assessment:   61 y/o female with PMHx breast CA diagnosed in 2017 (s/p bilateral mastectomy and breast reconstruction), abdominal carcinomatosis (s/p chemo/radiation 12/19 - 4/20 and hysterectomy 10/2019), HTN, GERD, sleep apnea admitted with recurrent SBO, Malignant SBO protocol. S/P Diagnostic laparoscopy, ex lap, SHARIFA POD#6.      Plan:  Plan:  -continue NPO, IVF. Possible d/c NGT today and advance diet in setting of flatus  -PPN per GI. F/u AM labs   -continue OOB/Ambulation, PT  -Pain management PRN; Tylenol/Toradol ATC, limit narcotics   -Continue Reglan/Octreotide.  -Medical/onc f/u.  -DVT prophylaxis, incentive spirometer    Date/Time: 09-16-20 @ 06:28

## 2020-09-16 NOTE — PROGRESS NOTE ADULT - SUBJECTIVE AND OBJECTIVE BOX
CHIEF COMPLAINT: s/p exploratory laparotomy with bowel resection.   Feeling better. started passing gas but no BM.  wants to eat.   Abd pain improving.   no chest pain or sob       PHYSICAL EXAM:    GENERAL: moderately malnourished.   CHEST/LUNG: Clear to ausculation bilaterally, no wheezing, no crackles   HEART: Regular rate and rhythm; No murmurs, rubs  ABDOMEN: + BS/ midline dressing over surgical wound. + NGT out   EXTREMITIES:  Moving all four extremities spontaneously, No clubbing, cyanosis, or edema  NERVOUS SYSTEM:  Grossly non focal.  Psychiatry: AAO x 3, mood is appropriate       OBJECTIVE DATA:         Vital Signs Last 24 Hrs  T(C): 36.8 (16 Sep 2020 10:43), Max: 37 (15 Sep 2020 23:51)  T(F): 98.2 (16 Sep 2020 10:43), Max: 98.6 (15 Sep 2020 23:51)  HR: 77 (16 Sep 2020 10:43) (67 - 77)  BP: 168/91 (16 Sep 2020 10:43) (150/81 - 168/91)  BP(mean): --  RR: 17 (16 Sep 2020 10:43) (17 - 18)  SpO2: 100% (16 Sep 2020 10:43) (96% - 100%)           Daily     Daily Weight in k.6 (16 Sep 2020 05:35)  LABS:                        9.1    3.64  )-----------( 148      ( 16 Sep 2020 07:02 )             28.6             09-16    141  |  107  |  2<L>  ----------------------------<  114<H>  3.8   |  30  |  0.54    Ca    9.1      16 Sep 2020 07:02  Phos  2.7     09-16  Mg     2.1     09-16                         MEDICATIONS  (STANDING):  chlorhexidine 4% Liquid 1 Application(s) Topical <User Schedule>  dextrose 5% + sodium chloride 0.45% with potassium chloride 20 mEq/L 1000 milliLiter(s) (125 mL/Hr) IV Continuous <Continuous>  fat emulsion (Plant Based) 20% Infusion 0.625 Gm/kG/Day (10.4 mL/Hr) IV Continuous <Continuous>  heparin   Injectable 5000 Unit(s) SubCutaneous every 8 hours  octreotide  Infusion 25 MICROgram(s)/Hr (5 mL/Hr) IV Continuous <Continuous>  pantoprazole  Injectable 40 milliGRAM(s) IV Push daily  Parenteral Nutrition - Adult 1 Each (42 mL/Hr) TPN Continuous <Continuous>    MEDICATIONS  (PRN):  benzocaine 15 mG/menthol 3.6 mG (Sugar-Free) Lozenge 1 Lozenge Oral every 3 hours PRN Mouth Sores  enalaprilat Injectable 2.5 milliGRAM(s) IV Push every 6 hours PRN hypertension with SBP > 120

## 2020-09-16 NOTE — PROGRESS NOTE ADULT - SUBJECTIVE AND OBJECTIVE BOX
Postoperative Day #: 6  Patient seen and examined seated at bedside. Overall feels well.  Reports +Flatus since yesterday evening, and continues to have flatus overnight/this morning. Admits to mild 3/10 abdominal pain and feels she may need to have a BM at present time.  No n/v. Afebrile. Ambulating.  Uses lozenges for discomfort associated with NGT    T(F): 98 (09-16-20 @ 05:35), Max: 98.6 (09-15-20 @ 23:51)  HR: 67 (09-16-20 @ 05:35) (67 - 77)  BP: 162/92 (09-16-20 @ 05:35) (150/81 - 162/92)  RR: 18 (09-16-20 @ 05:35) (17 - 18)  SpO2: 96% (09-16-20 @ 05:35) (96% - 98%)  Wt(kg): --    PHYSICAL EXAM:  General: NAD, WDWN  Neuro:  Alert & oriented x 3  Neck: LIJ TLC intact  HEENT: NCAT, EOMI, conjunctiva clear. NGT indwelling, output is nonbilious, less than 200cc in cannister since yesterday  CV: +S1+S2 regular rate and rhythm  Lung: clear to auscultation bilaterally, respirations nonlabored, good inspiratory effort  Abdomen: soft, slightly distended. Active BS. Midline incision well approximated with steri strips and dry dressing, CDI.  Extremities: no pedal edema or calf tenderness noted     LABS:          pending.      Assessment: 59 y/o female with PMHx breast CA diagnosed in 2017 (s/p bilateral mastectomy and breast reconstruction), abdominal carcinomatosis (s/p chemo/radiation 12/19 - 4/20 and hysterectomy 10/2019), HTN, GERD, sleep apnea admitted with recurrent SBO, Malignant SBO protocol. S/P Diagnostic laparoscopy, ex lap, SHARIFA POD#6.  +flatus    Plan:  -continue NPO, IVF. Possible d/c NGT today and advance diet in setting of flatus  -PPN per GI. F/u AM labs   -continue OOB/Ambulation, PT  -Pain management PRN; Tylenol/Toradol ATC, limit narcotics   -Continue Reglan/Octreotide.  -Medical/onc f/u.  -DVT prophylaxis, incentive spirometer  -Will d/w Surgical attending

## 2020-09-16 NOTE — PROGRESS NOTE ADULT - SUBJECTIVE AND OBJECTIVE BOX
Patient is a 60y old  Female who presents with a chief complaint of Abdominal pain (16 Sep 2020 10:00)      HPI:  61 y/o female with PMHx breast CA diagnosed in 2017 (s/p bilateral mastectomy and breast reconstruction), abdominal carcinomatosis (s/p chemo/radiation 12/19 - 4/20 and hysterectomy 10/2019), HTN, GERD, sleep apnea   now presents to the ED accompanied by her daughter c/o worsening abdominal pain of two days duration, described as severe, 12/10 pain, crampy, nonradiating periumbilical, and worse at night/with lying down. She is known to service and was recently discharged last week following conservative tx for SBO. She reports she vomited twice yesterday, NBNB. Pt admits she used miralax and an enema and had a BM this am. Pain is similar to last week. No nausea/vomiting at present. Patient denies fever, chills, diarrhea, melena, hematochezia, dysuria, hematuria, chest pain, shortness of breath, dizziness, cough. She also complains of back pain; ambulates independently at home. (07 Sep 2020 14:59)      INTERVAL HPI/OVERNIGHT EVENTS:  Flatus (+) BM (-) NGT removed. NPO. The patient denies / nurse melena, hematochezia, and hematemesis.    MEDICATIONS  (STANDING):  chlorhexidine 4% Liquid 1 Application(s) Topical <User Schedule>  dextrose 5% + sodium chloride 0.45% with potassium chloride 20 mEq/L 1000 milliLiter(s) (125 mL/Hr) IV Continuous <Continuous>  fat emulsion (Plant Based) 20% Infusion 1.25 Gm/kG/Day (20.8 mL/Hr) IV Continuous <Continuous>  heparin   Injectable 5000 Unit(s) SubCutaneous every 8 hours  octreotide  Infusion 25 MICROgram(s)/Hr (5 mL/Hr) IV Continuous <Continuous>  pantoprazole  Injectable 40 milliGRAM(s) IV Push daily  Parenteral Nutrition - Adult 1 Each (42 mL/Hr) TPN Continuous <Continuous>    MEDICATIONS  (PRN):  benzocaine 15 mG/menthol 3.6 mG (Sugar-Free) Lozenge 1 Lozenge Oral every 3 hours PRN Mouth Sores  enalaprilat Injectable 2.5 milliGRAM(s) IV Push every 6 hours PRN hypertension with SBP > 120      FAMILY HISTORY:      Allergies    No Known Allergies    Intolerances        PMH/PSH:  Essential hypertension    Cancer of breast    Sleep apnea    Obesity (BMI 30.0-34.9)    Seasonal allergies    Acid reflux    Hypertension    S/P hysterectomy    S/P breast reconstruction, bilateral    S/P bilateral mastectomy    Knee injury          REVIEW OF SYSTEMS:  CONSTITUTIONAL: No fever, weight loss, or fatigue  EYES: No eye pain, visual disturbances, or discharge  ENMT:  No difficulty hearing, tinnitus, vertigo; No sinus or throat pain  NECK: No pain or stiffness  BREASTS: No pain, masses, or nipple discharge  RESPIRATORY: No cough, wheezing, chills or hemoptysis; No shortness of breath  CARDIOVASCULAR: No chest pain, palpitations, dizziness, or leg swelling  GASTROINTESTINAL: See above  GENITOURINARY: No dysuria, frequency, hematuria, or incontinence  NEUROLOGICAL: No headaches, memory loss, numbness, or tremors  SKIN: No itching, burning, rashes, or lesions   LYMPH NODES: No enlarged glands  ENDOCRINE: No heat or cold intolerance; No hair loss  MUSCULOSKELETAL: No joint pain or swelling; No muscle, back, or extremity pain  PSYCHIATRIC: No depression, anxiety, mood swings, or difficulty sleeping  HEME/LYMPH: No easy bruising, or bleeding gums  ALLERGY AND IMMUNOLOGIC: No hives or eczema    Vital Signs Last 24 Hrs  T(C): 36.8 (16 Sep 2020 10:43), Max: 37 (15 Sep 2020 23:51)  T(F): 98.2 (16 Sep 2020 10:43), Max: 98.6 (15 Sep 2020 23:51)  HR: 77 (16 Sep 2020 10:43) (67 - 77)  BP: 168/91 (16 Sep 2020 10:43) (150/81 - 168/91)  BP(mean): --  RR: 17 (16 Sep 2020 10:43) (17 - 18)  SpO2: 100% (16 Sep 2020 10:43) (96% - 100%)    PHYSICAL EXAM:  GENERAL: NAD, well-groomed, well-developed  HEAD:  Atraumatic, Normocephalic  EYES: EOMI, PERRLA, conjunctiva and sclera clear  NECK: Supple, No JVD, Normal thyroid  NERVOUS SYSTEM:  Alert & Oriented X3, Good concentration;   CHEST/LUNG: Clear to percussion bilaterally; No rales, rhonchi, wheezing, or rubs  HEART: Regular rate and rhythm; No murmurs, rubs, or gallops  ABDOMEN: Soft, Nontender, Nondistended; Bowel sounds present  EXTREMITIES:  2+ Peripheral Pulses, No clubbing, cyanosis, or edema  LYMPH: No lymphadenopathy noted  SKIN: No rashes or lesions    LAB                          9.1    3.64  )-----------( 148      ( 16 Sep 2020 07:02 )             28.6       CBC:  09-16 @ 07:02  WBC 3.64   Hgb 9.1   Hct 28.6   Plts 148  .5  09-15 @ 07:41  WBC 4.22   Hgb 10.5   Hct 31.0   Plts 151  MCV 99.7  09-14 @ 10:40  WBC 4.57   Hgb 11.5   Hct 34.1   Plts 172  MCV 99.7  09-13 @ 07:13  WBC 6.26   Hgb 10.5   Hct 31.9   Plts 158  .3  09-12 @ 08:07  WBC 7.04   Hgb 11.2   Hct 34.4   Plts 152  .5  09-11 @ 07:27  WBC 13.61   Hgb 12.1   Hct 36.2   Plts 156  .6  09-10 @ 04:30  WBC 5.84   Hgb 10.9   Hct 33.6   Plts 165  .1      Chemistry:  09-16 @ 07:02  Na+ 141  K+ 3.8  Cl- 107  CO2 30  BUN 2  Cr 0.54     09-15 @ 07:41  Na+ 141  K+ 3.6  Cl- 106  CO2 30  BUN 1  Cr 0.65     09-14 @ 10:40  Na+ 139  K+ 3.4  Cl- 102  CO2 31  BUN 2  Cr 0.61     09-13 @ 07:13  Na+ 137  K+ 3.3  Cl- 99  CO2 29  BUN 6  Cr 0.55     09-12 @ 08:07  Na+ 140  K+ 3.6  Cl- 105  CO2 31  BUN 8  Cr 0.71     09-11 @ 17:32  Na+ 139  K+ 3.8  Cl- 102  CO2 32  BUN 8  Cr 0.84     09-11 @ 07:27  Na+ 141  K+ 4.0  Cl- 105  CO2 32  BUN 8  Cr 1.28     09-10 @ 04:30  Na+ 141  K+ 3.3  Cl- 104  CO2 34  BUN 8  Cr 0.73         Glucose, Serum: 114 mg/dL (09-16 @ 07:02)  Glucose, Serum: 128 mg/dL (09-15 @ 07:41)  Glucose, Serum: 128 mg/dL (09-14 @ 10:40)  Glucose, Serum: 70 mg/dL (09-13 @ 07:13)  Glucose, Serum: 67 mg/dL (09-12 @ 08:07)  Glucose, Serum: 95 mg/dL (09-11 @ 17:32)  Glucose, Serum: 114 mg/dL (09-11 @ 07:27)  Glucose, Serum: 132 mg/dL (09-10 @ 04:30)      16 Sep 2020 07:02    141    |  107    |  2      ----------------------------<  114    3.8     |  30     |  0.54   15 Sep 2020 07:41    141    |  106    |  1      ----------------------------<  128    3.6     |  30     |  0.65   14 Sep 2020 10:40    139    |  102    |  2      ----------------------------<  128    3.4     |  31     |  0.61   13 Sep 2020 07:13    137    |  99     |  6      ----------------------------<  70     3.3     |  29     |  0.55   12 Sep 2020 08:07    140    |  105    |  8      ----------------------------<  67     3.6     |  31     |  0.71   11 Sep 2020 17:32    139    |  102    |  8      ----------------------------<  95     3.8     |  32     |  0.84   11 Sep 2020 07:27    141    |  105    |  8      ----------------------------<  114    4.0     |  32     |  1.28     Ca    9.1        16 Sep 2020 07:02  Ca    8.6        15 Sep 2020 07:41  Ca    9.0        14 Sep 2020 10:40  Ca    8.6        13 Sep 2020 07:13  Ca    8.3        12 Sep 2020 08:07  Ca    8.4        11 Sep 2020 17:32  Ca    8.6        11 Sep 2020 07:27  Phos  2.7       16 Sep 2020 07:02  Phos  2.2       15 Sep 2020 07:41  Phos  1.7       14 Sep 2020 10:40  Phos  2.2       13 Sep 2020 07:13  Phos  3.1       12 Sep 2020 08:07  Phos  4.1       11 Sep 2020 07:27  Phos  2.7       10 Sep 2020 04:30  Mg     2.1       16 Sep 2020 07:02  Mg     2.3       15 Sep 2020 07:41  Mg     2.0       14 Sep 2020 10:40  Mg     2.1       13 Sep 2020 07:13  Mg     2.1       12 Sep 2020 08:07  Mg     2.1       11 Sep 2020 07:27  Mg     2.4       10 Sep 2020 04:30    TPro  6.1    /  Alb  2.3    /  TBili  0.7    /  DBili  x      /  AST  17     /  ALT  18     /  AlkPhos  57     13 Sep 2020 07:13  TPro  5.6    /  Alb  2.3    /  TBili  0.7    /  DBili  x      /  AST  8      /  ALT  16     /  AlkPhos  51     12 Sep 2020 08:07              CAPILLARY BLOOD GLUCOSE              RADIOLOGY & ADDITIONAL TESTS:    Imaging Personally Reviewed:  [ ] YES  [ ] NO    Consultant(s) Notes Reviewed:  [ ] YES  [ ] NO    Care Discussed with Consultants/Other Providers [ ] YES  [ ] NO

## 2020-09-16 NOTE — PROGRESS NOTE ADULT - ASSESSMENT
61 yo female with PMHx breast CA diagnosed in 2017 (s/p bilateral mastectomy and breast reconstruction), abdominal carcinomatosis (s/p chemo/radiation 12/19 - 4/20 and hysterectomy 10/2019), HTN, GERD, sleep apnea p/w recurrent SBO.    Mechanical SBO. S/p laparotomy and bowel resection. abd pain better. Passing gas. started liquid diet and NGT is out. GI is consulted and planning to start PPN. Follow labs in am.     Hypokalemia. Repleted    hypophosphatemia. low. repleted.     HTN  -  elevated. cont enalapril iv with holding parameters. will start oral meds if tolerates oral liquids     GERD  - c/w IV Protonix    Macrocytosis. Unremarkable vitamin B12 and folate level.     Moderate protein calorie malnutrition. nutritional consult recs appreciated.

## 2020-09-16 NOTE — CHART NOTE - NSCHARTNOTEFT_GEN_A_CORE
NGT removed, pt continues to have flatus, no bm yet, pt states she is very hungry.  Will start on clears. Discussed with Dr. Cuello.

## 2020-09-16 NOTE — PROGRESS NOTE ADULT - SUBJECTIVE AND OBJECTIVE BOX
lying in bed, Flatus +    Vital Signs Last 24 Hrs  T(C): 36.7 (16 Sep 2020 05:35), Max: 37 (15 Sep 2020 23:51)  T(F): 98 (16 Sep 2020 05:35), Max: 98.6 (15 Sep 2020 23:51)  HR: 67 (16 Sep 2020 05:35) (67 - 77)  BP: 162/92 (16 Sep 2020 05:35) (150/81 - 162/92)  BP(mean): --  RR: 18 (16 Sep 2020 05:35) (17 - 18)  SpO2: 96% (16 Sep 2020 05:35) (96% - 98%)    PHYSICAL EXAM:    general - AAO x 3  HEENT - No Icterus, NGT +  CVS - RRR  RS - AE B/L  Abd - soft, NT  Ext - Pulses +        LABS:                        9.1    3.64  )-----------( 148      ( 16 Sep 2020 07:02 )             28.6     09-16    141  |  107  |  2<L>  ----------------------------<  114<H>  3.8   |  30  |  0.54    Ca    9.1      16 Sep 2020 07:02  Phos  2.7     09-16  Mg     2.1     09-16              RADIOLOGY & ADDITIONAL STUDIES:

## 2020-09-16 NOTE — ED PROCEDURE NOTE - US CPT CODES
[address]846 Guidance for Vascular Access
Asthma    Atrial fibrillation    Benign Prostate Hyperplasia    Hemothorax with pneumothorax, traumatic    HTN (Hypertension)    Osteoarthritis    Osteoporosis    Sleep apnea  on cpap  Urolithiasis  s/p r nephrostomy

## 2020-09-17 LAB
CA-I BLD-SCNC: 1.21 MMOL/L — SIGNIFICANT CHANGE UP (ref 1.12–1.3)
HCT VFR BLD CALC: 30.5 % — LOW (ref 34.5–45)
HGB BLD-MCNC: 10 G/DL — LOW (ref 11.5–15.5)
MCHC RBC-ENTMCNC: 32.8 GM/DL — SIGNIFICANT CHANGE UP (ref 32–36)
MCHC RBC-ENTMCNC: 33.4 PG — SIGNIFICANT CHANGE UP (ref 27–34)
MCV RBC AUTO: 102 FL — HIGH (ref 80–100)
NRBC # BLD: 0 /100 WBCS — SIGNIFICANT CHANGE UP (ref 0–0)
PLATELET # BLD AUTO: 168 K/UL — SIGNIFICANT CHANGE UP (ref 150–400)
RBC # BLD: 2.99 M/UL — LOW (ref 3.8–5.2)
RBC # FLD: 14.7 % — HIGH (ref 10.3–14.5)
WBC # BLD: 3.7 K/UL — LOW (ref 3.8–10.5)
WBC # FLD AUTO: 3.7 K/UL — LOW (ref 3.8–10.5)

## 2020-09-17 PROCEDURE — 99232 SBSQ HOSP IP/OBS MODERATE 35: CPT

## 2020-09-17 RX ADMIN — Medication 15 MILLIGRAM(S): at 13:20

## 2020-09-17 RX ADMIN — HEPARIN SODIUM 5000 UNIT(S): 5000 INJECTION INTRAVENOUS; SUBCUTANEOUS at 21:33

## 2020-09-17 RX ADMIN — HEPARIN SODIUM 5000 UNIT(S): 5000 INJECTION INTRAVENOUS; SUBCUTANEOUS at 13:25

## 2020-09-17 RX ADMIN — CHLORHEXIDINE GLUCONATE 1 APPLICATION(S): 213 SOLUTION TOPICAL at 05:31

## 2020-09-17 RX ADMIN — SODIUM CHLORIDE 80 MILLILITER(S): 9 INJECTION, SOLUTION INTRAVENOUS at 13:26

## 2020-09-17 RX ADMIN — OCTREOTIDE ACETATE 5 MICROGRAM(S)/HR: 200 INJECTION, SOLUTION INTRAVENOUS; SUBCUTANEOUS at 16:51

## 2020-09-17 RX ADMIN — HEPARIN SODIUM 5000 UNIT(S): 5000 INJECTION INTRAVENOUS; SUBCUTANEOUS at 05:31

## 2020-09-17 RX ADMIN — Medication 15 MILLIGRAM(S): at 14:56

## 2020-09-17 RX ADMIN — Medication 1000 MILLIGRAM(S): at 00:09

## 2020-09-17 NOTE — PROGRESS NOTE ADULT - PROBLEM SELECTOR PLAN 1
- Advance diet as tolerated  - if tolerating diet, full body CT with contrast prior to discharge  - Elevated   - Physical therapy

## 2020-09-17 NOTE — PROGRESS NOTE ADULT - SUBJECTIVE AND OBJECTIVE BOX
Patient is a 60y old  Female who presents with a chief complaint of Abdominal pain (17 Sep 2020 10:21)      HPI:  61 y/o female with PMHx breast CA diagnosed in 2017 (s/p bilateral mastectomy and breast reconstruction), abdominal carcinomatosis (s/p chemo/radiation 12/19 - 4/20 and hysterectomy 10/2019), HTN, GERD, sleep apnea   now presents to the ED accompanied by her daughter c/o worsening abdominal pain of two days duration, described as severe, 12/10 pain, crampy, nonradiating periumbilical, and worse at night/with lying down. She is known to service and was recently discharged last week following conservative tx for SBO. She reports she vomited twice yesterday, NBNB. Pt admits she used miralax and an enema and had a BM this am. Pain is similar to last week. No nausea/vomiting at present. Patient denies fever, chills, diarrhea, melena, hematochezia, dysuria, hematuria, chest pain, shortness of breath, dizziness, cough. She also complains of back pain; ambulates independently at home. (07 Sep 2020 14:59)      INTERVAL HPI/OVERNIGHT EVENTS:  minimal abdominal pain, Flatus (+), BM (-) ( " but i have a tingling in the rectum ") Tolerating clear liquids. No N/V    MEDICATIONS  (STANDING):  chlorhexidine 4% Liquid 1 Application(s) Topical <User Schedule>  dextrose 5% + sodium chloride 0.45% with potassium chloride 20 mEq/L 1000 milliLiter(s) (125 mL/Hr) IV Continuous <Continuous>  dextrose 5% + sodium chloride 0.45%. 1000 milliLiter(s) (80 mL/Hr) IV Continuous <Continuous>  fat emulsion (Plant Based) 20% Infusion 0.625 Gm/kG/Day (10.4 mL/Hr) IV Continuous <Continuous>  heparin   Injectable 5000 Unit(s) SubCutaneous every 8 hours  lisinopril 10 milliGRAM(s) Oral daily  octreotide  Infusion 25 MICROgram(s)/Hr (5 mL/Hr) IV Continuous <Continuous>  Parenteral Nutrition - Adult 1 Each (42 mL/Hr) TPN Continuous <Continuous>    MEDICATIONS  (PRN):  ketorolac   Injectable 15 milliGRAM(s) IV Push every 6 hours PRN Moderate Pain (4 - 6)      FAMILY HISTORY:      Allergies    No Known Allergies    Intolerances        PMH/PSH:  Essential hypertension    Cancer of breast    Sleep apnea    Obesity (BMI 30.0-34.9)    Seasonal allergies    Acid reflux    Hypertension    S/P hysterectomy    S/P breast reconstruction, bilateral    S/P bilateral mastectomy    Knee injury          REVIEW OF SYSTEMS:  CONSTITUTIONAL: No fever, weight loss, or fatigue  EYES: No eye pain, visual disturbances, or discharge  ENMT:  No difficulty hearing, tinnitus, vertigo; No sinus or throat pain  NECK: No pain or stiffness  BREASTS: No pain, masses, or nipple discharge  RESPIRATORY: No cough, wheezing, chills or hemoptysis; No shortness of breath  CARDIOVASCULAR: No chest pain, palpitations, dizziness, or leg swelling  GASTROINTESTINAL:  See above  GENITOURINARY: No dysuria, frequency, hematuria, or incontinence  NEUROLOGICAL: No headaches, memory loss, loss of strength, numbness, or tremors  SKIN: No itching, burning, rashes, or lesions   LYMPH NODES: No enlarged glands  ENDOCRINE: No heat or cold intolerance; No hair loss  MUSCULOSKELETAL: No joint pain or swelling; No muscle, back, or extremity pain  PSYCHIATRIC: No depression, anxiety, mood swings, or difficulty sleeping  HEME/LYMPH: No easy bruising, or bleeding gums  ALLERGY AND IMMUNOLOGIC: No hives or eczema    Vital Signs Last 24 Hrs  T(C): 36.9 (17 Sep 2020 06:14), Max: 37.3 (16 Sep 2020 16:34)  T(F): 98.5 (17 Sep 2020 06:14), Max: 99.1 (16 Sep 2020 16:34)  HR: 82 (17 Sep 2020 06:14) (77 - 82)  BP: 137/77 (17 Sep 2020 06:14) (137/77 - 168/91)  BP(mean): --  RR: 18 (17 Sep 2020 06:14) (17 - 18)  SpO2: 99% (17 Sep 2020 06:14) (97% - 100%)    PHYSICAL EXAM:  GENERAL: NAD, well-groomed, well-developed  HEAD:  Atraumatic, Normocephalic  EYES: EOMI, PERRLA, conjunctiva and sclera clear  NECK: Supple, No JVD, Normal thyroid  NERVOUS SYSTEM:  Alert & Oriented X3, Good concentration; Motor Strength 5/5 B/L upper and lower extremities;   CHEST/LUNG: Clear to percussion bilaterally; No rales, rhonchi, wheezing, or rubs  HEART: Regular rate and rhythm; No murmurs, rubs, or gallops  ABDOMEN: Soft, Nontender, Nondistended; Bowel sounds present  EXTREMITIES:  2+ Peripheral Pulses, No clubbing, cyanosis, or edema  LYMPH: No lymphadenopathy noted  SKIN: No rashes or lesions    LAB                          10.0   3.70  )-----------( 168      ( 17 Sep 2020 07:40 )             30.5       CBC:  09-17 @ 07:40  WBC 3.70   Hgb 10.0   Hct 30.5   Plts 168  .0  09-16 @ 07:02  WBC 3.64   Hgb 9.1   Hct 28.6   Plts 148  .5  09-15 @ 07:41  WBC 4.22   Hgb 10.5   Hct 31.0   Plts 151  MCV 99.7  09-14 @ 10:40  WBC 4.57   Hgb 11.5   Hct 34.1   Plts 172  MCV 99.7  09-13 @ 07:13  WBC 6.26   Hgb 10.5   Hct 31.9   Plts 158  .3  09-12 @ 08:07  WBC 7.04   Hgb 11.2   Hct 34.4   Plts 152  .5  09-11 @ 07:27  WBC 13.61   Hgb 12.1   Hct 36.2   Plts 156  .6      Chemistry:  09-17 @ 07:40  Na+ 143  K+ 3.5  Cl- 109  CO2 29  BUN 3  Cr 0.68     09-16 @ 07:02  Na+ 141  K+ 3.8  Cl- 107  CO2 30  BUN 2  Cr 0.54     09-15 @ 07:41  Na+ 141  K+ 3.6  Cl- 106  CO2 30  BUN 1  Cr 0.65     09-14 @ 10:40  Na+ 139  K+ 3.4  Cl- 102  CO2 31  BUN 2  Cr 0.61     09-13 @ 07:13  Na+ 137  K+ 3.3  Cl- 99  CO2 29  BUN 6  Cr 0.55     09-12 @ 08:07  Na+ 140  K+ 3.6  Cl- 105  CO2 31  BUN 8  Cr 0.71     09-11 @ 17:32  Na+ 139  K+ 3.8  Cl- 102  CO2 32  BUN 8  Cr 0.84     09-11 @ 07:27  Na+ 141  K+ 4.0  Cl- 105  CO2 32  BUN 8  Cr 1.28         Glucose, Serum: 119 mg/dL (09-17 @ 07:40)  Glucose, Serum: 114 mg/dL (09-16 @ 07:02)  Glucose, Serum: 128 mg/dL (09-15 @ 07:41)  Glucose, Serum: 128 mg/dL (09-14 @ 10:40)  Glucose, Serum: 70 mg/dL (09-13 @ 07:13)  Glucose, Serum: 67 mg/dL (09-12 @ 08:07)  Glucose, Serum: 95 mg/dL (09-11 @ 17:32)  Glucose, Serum: 114 mg/dL (09-11 @ 07:27)      17 Sep 2020 07:40    143    |  109    |  3      ----------------------------<  119    3.5     |  29     |  0.68   16 Sep 2020 07:02    141    |  107    |  2      ----------------------------<  114    3.8     |  30     |  0.54   15 Sep 2020 07:41    141    |  106    |  1      ----------------------------<  128    3.6     |  30     |  0.65   14 Sep 2020 10:40    139    |  102    |  2      ----------------------------<  128    3.4     |  31     |  0.61   13 Sep 2020 07:13    137    |  99     |  6      ----------------------------<  70     3.3     |  29     |  0.55   12 Sep 2020 08:07    140    |  105    |  8      ----------------------------<  67     3.6     |  31     |  0.71   11 Sep 2020 17:32    139    |  102    |  8      ----------------------------<  95     3.8     |  32     |  0.84     Ca    8.8        17 Sep 2020 07:40  Ca    9.1        16 Sep 2020 07:02  Ca    8.6        15 Sep 2020 07:41  Ca    9.0        14 Sep 2020 10:40  Ca    8.6        13 Sep 2020 07:13  Ca    8.3        12 Sep 2020 08:07  Ca    8.4        11 Sep 2020 17:32  Phos  2.8       17 Sep 2020 07:40  Phos  2.7       16 Sep 2020 07:02  Phos  2.2       15 Sep 2020 07:41  Phos  1.7       14 Sep 2020 10:40  Phos  2.2       13 Sep 2020 07:13  Phos  3.1       12 Sep 2020 08:07  Phos  4.1       11 Sep 2020 07:27  Mg     2.1       17 Sep 2020 07:40  Mg     2.1       16 Sep 2020 07:02  Mg     2.3       15 Sep 2020 07:41  Mg     2.0       14 Sep 2020 10:40  Mg     2.1       13 Sep 2020 07:13  Mg     2.1       12 Sep 2020 08:07  Mg     2.1       11 Sep 2020 07:27    TPro  6.4    /  Alb  2.5    /  TBili  0.5    /  DBili  x      /  AST  24     /  ALT  25     /  AlkPhos  101    17 Sep 2020 07:40  TPro  6.1    /  Alb  2.3    /  TBili  0.7    /  DBili  x      /  AST  17     /  ALT  18     /  AlkPhos  57     13 Sep 2020 07:13  TPro  5.6    /  Alb  2.3    /  TBili  0.7    /  DBili  x      /  AST  8      /  ALT  16     /  AlkPhos  51     12 Sep 2020 08:07              CAPILLARY BLOOD GLUCOSE              RADIOLOGY & ADDITIONAL TESTS:    Imaging Personally Reviewed:  [ ] YES  [ ] NO    Consultant(s) Notes Reviewed:  [ ] YES  [ ] NO    Care Discussed with Consultants/Other Providers [ ] YES  [ ] NO

## 2020-09-17 NOTE — PROGRESS NOTE ADULT - ASSESSMENT
59 yo female with PMHx breast CA diagnosed in 2017 (s/p bilateral mastectomy and breast reconstruction), abdominal carcinomatosis (s/p chemo/radiation 12/19 - 4/20 and hysterectomy 10/2019), HTN, GERD, sleep apnea p/w recurrent SBO.    Mechanical SBO. S/p laparotomy and bowel resection. abd pain better. Passing gas. no BM still. cont current diet and advance per surgery service. PPN per GI.     Hypokalemia. Repleted    hypophosphatemia. low. repleted.     HTN  - better controlled. DC iv enalapri and start lisinopril. monitor.     GERD  - c/w IV Protonix    Macrocytosis. Unremarkable vitamin B12 and folate level.     Moderate protein calorie malnutrition. nutritional consult recs appreciated.

## 2020-09-17 NOTE — PROGRESS NOTE ADULT - SUBJECTIVE AND OBJECTIVE BOX
CHIEF COMPLAINT: s/p exploratory laparotomy with bowel resection.   tolerating liquid diet and PPN  no n/v  passing gas  no BM       PHYSICAL EXAM:    GENERAL: moderately malnourished.   CHEST/LUNG: Clear to ausculation bilaterally, no wheezing, no crackles   HEART: Regular rate and rhythm; No murmurs, rubs  ABDOMEN: + BS/ midline dressing over surgical wound. + NGT out   EXTREMITIES:  Moving all four extremities spontaneously, No clubbing, cyanosis, or edema  NERVOUS SYSTEM:  Grossly non focal.  Psychiatry: AAO x 3, mood is appropriate       OBJECTIVE DATA:       Vital Signs Last 24 Hrs  T(C): 37.2 (17 Sep 2020 11:00), Max: 37.3 (16 Sep 2020 16:34)  T(F): 98.9 (17 Sep 2020 11:00), Max: 99.1 (16 Sep 2020 16:34)  HR: 87 (17 Sep 2020 11:00) (78 - 87)  BP: 165/95 (17 Sep 2020 11:00) (137/77 - 165/95)  BP(mean): --  RR: 17 (17 Sep 2020 11:00) (17 - 18)  SpO2: 99% (17 Sep 2020 11:00) (97% - 99%)           Daily     Daily Weight in k.7 (17 Sep 2020 06:14)  LABS:                        10.0   3.70  )-----------( 168      ( 17 Sep 2020 07:40 )             30.5             -17    143  |  109<H>  |  3<L>  ----------------------------<  119<H>  3.5   |  29  |  0.68    Ca    8.8      17 Sep 2020 07:40  Phos  2.8       Mg     2.1         TPro  6.4  /  Alb  2.5<L>  /  TBili  0.5  /  DBili  x   /  AST  24  /  ALT  25  /  AlkPhos  101  -17                       MEDICATIONS  (STANDING):  chlorhexidine 4% Liquid 1 Application(s) Topical <User Schedule>  dextrose 5% + sodium chloride 0.45%. 1000 milliLiter(s) (80 mL/Hr) IV Continuous <Continuous>  fat emulsion (Plant Based) 20% Infusion 0.625 Gm/kG/Day (10.4 mL/Hr) IV Continuous <Continuous>  heparin   Injectable 5000 Unit(s) SubCutaneous every 8 hours  lisinopril 10 milliGRAM(s) Oral daily  octreotide  Infusion 25 MICROgram(s)/Hr (5 mL/Hr) IV Continuous <Continuous>  Parenteral Nutrition - Adult 1 Each (42 mL/Hr) TPN Continuous <Continuous>  Parenteral Nutrition - Adult 1 Each (42 mL/Hr) TPN Continuous <Continuous>    MEDICATIONS  (PRN):  ketorolac   Injectable 15 milliGRAM(s) IV Push every 6 hours PRN Moderate Pain (4 - 6)

## 2020-09-17 NOTE — MEDICAL STUDENT PROGRESS NOTE(EDUCATION) - SUBJECTIVE AND OBJECTIVE BOX
GENERAL SURGERY PROGRESS NOTE    Patient: ERLIN SCHMIDT , 60y ([birth year])Female   MRN: [id number]  Location: [facility] 2C [address]  Visit: 09-07-20 Inpatient  Date: 09-17-20 @ 07:13    Post-Op Day #: 7    Procedure/Dx/Injuries:  Diagnostic laparoscopy, ex lap, SHARIFA     Events of past 24 hours: Overnight, pt had NG tube removed. She states she felt febrile yesterday tmax 99.1F, received acetaminophen with improvement. Reports no abdominal pain but describes "sometimes feeling bloated". Reports no n/v, no BM yet but has been making urine and passing flatus. Is tolerating clear liquids and is receiving parenteral nutrition via central line.    PAST MEDICAL & SURGICAL HISTORY:  Essential hypertension    Cancer of breast  Rt. Female    Sleep apnea  Use Oral Device  Obesity (BMI 30.0-34.9)  Seasonal allergies  Acid reflux  S/P hysterectomy  S/P breast reconstruction, bilateral  S/P bilateral mastectomy  Knee injury  Left &amp; had surgery about 30 years ago        Vitals: T(F): 98.5 (09-17-20 @ 06:14), Max: 99.1 (09-16-20 @ 16:34)  HR: 82 (09-17-20 @ 06:14)  BP: 137/77 (09-17-20 @ 06:14)  RR: 18 (09-17-20 @ 06:14)  SpO2: 99% (09-17-20 @ 06:14)      Pain (0-10):     0       Pain Control Adequate: [X] YES [] N    Diet, Clear Liquid  and parenteral nutrition    09-16-20 @ 07:01  -  09-17-20 @ 07:00  --------------------------------------------------------  IN:    dextrose 5% + sodium chloride 0.45%: 980 mL    Fat Emulsion (Plant Based) 20%: 124.8 mL    Octreotide: 60 mL    Oral Fluid: 320 mL  Total IN: 1484.8 mL    OUT:    Nasogastric/Oral tube (mL): 100 mL  Total OUT: 100 mL    Total NET: 1384.8 mL        Bowel Movement: : [] YES [X] NO  Flatus: : [X YES [] NO    PHYSICAL EXAM  GEN: in NAD, resting comfortably in bed  CV: Regular rate and rhythm, no murmur  LUNGS: CTA bilaterally, symmetric chest wall expansion  ABD: midline incision with steri strips with no drainage, incision is clean and dry with no erythema. abdomen is mildly distended with bowel sounds heard. mild tenderness in the RLQ no guarding, no rebound tenderness.  EXT: FROM, out of bed ambulating      MEDICATIONS  (STANDING):  chlorhexidine 4% Liquid 1 Application(s) Topical <User Schedule>  dextrose 5% + sodium chloride 0.45% with potassium chloride 20 mEq/L 1000 milliLiter(s) (125 mL/Hr) IV Continuous <Continuous>  dextrose 5% + sodium chloride 0.45%. 1000 milliLiter(s) (80 mL/Hr) IV Continuous <Continuous>  fat emulsion (Plant Based) 20% Infusion 0.625 Gm/kG/Day (10.4 mL/Hr) IV Continuous <Continuous>  heparin   Injectable 5000 Unit(s) SubCutaneous every 8 hours  octreotide  Infusion 25 MICROgram(s)/Hr (5 mL/Hr) IV Continuous <Continuous>  Parenteral Nutrition - Adult 1 Each (42 mL/Hr) TPN Continuous <Continuous>    MEDICATIONS  (PRN):  benzocaine 15 mG/menthol 3.6 mG (Sugar-Free) Lozenge 1 Lozenge Oral every 3 hours PRN Mouth Sores  enalaprilat Injectable 2.5 milliGRAM(s) IV Push every 6 hours PRN hypertension with SBP > 120  ketorolac   Injectable 15 milliGRAM(s) IV Push every 6 hours PRN Moderate Pain (4 - 6)      DVT PROPHYLAXIS: [x] YES [] NO  heparin  ANTIBIOTICS: [] YES [x] NO       LAB/STUDIES:  CAPILLARY BLOOD GLUCOSE                          9.1    3.64  )-----------( 148      ( 16 Sep 2020 07:02 )             28.6     09-16    141  |  107  |  2<L>  ----------------------------<  114<H>  3.8   |  30  |  0.54    Ca    9.1      16 Sep 2020 07:02  Phos  2.7     09-16  Mg     2.1     09-16                    IMAGING:    Assessment:  60yFemale patient PMHx breast CA diagnosed in 2017 (s/p bilateral mastectomy and breast reconstruction), abdominal carcinomatosis (s/p chemo/radiation 12/19 - 4/20 and hysterectomy 10/2019), HTN, GERD, sleep apnea admitted with recurrent SBO, Malignant SBO protocol. S/P Diagnostic laparoscopy, ex lap, SHARIFA POD#7. Is passing flatus, no BM yet.    Plan:      Date/Time: 09-17-20 @ 07:13

## 2020-09-17 NOTE — PROGRESS NOTE ADULT - SUBJECTIVE AND OBJECTIVE BOX
lying in bed    Vital Signs Last 24 Hrs  T(C): 36.9 (17 Sep 2020 06:14), Max: 37.3 (16 Sep 2020 16:34)  T(F): 98.5 (17 Sep 2020 06:14), Max: 99.1 (16 Sep 2020 16:34)  HR: 82 (17 Sep 2020 06:14) (77 - 82)  BP: 137/77 (17 Sep 2020 06:14) (137/77 - 168/91)  BP(mean): --  RR: 18 (17 Sep 2020 06:14) (17 - 18)  SpO2: 99% (17 Sep 2020 06:14) (97% - 100%)    PHYSICAL EXAM:    general - AAO x 3  HEENT - No Icterus  CVS - RRR  RS - AE B/L  Abd - soft, NT  Ext - Pulses +        LABS:                        10.0   3.70  )-----------( 168      ( 17 Sep 2020 07:40 )             30.5     09-17    143  |  109<H>  |  3<L>  ----------------------------<  119<H>  3.5   |  29  |  0.68    Ca    8.8      17 Sep 2020 07:40  Phos  2.8     09-17  Mg     2.1     09-17    TPro  6.4  /  Alb  2.5<L>  /  TBili  0.5  /  DBili  x   /  AST  24  /  ALT  25  /  AlkPhos  101  09-17            RADIOLOGY & ADDITIONAL STUDIES:

## 2020-09-17 NOTE — PROGRESS NOTE ADULT - SUBJECTIVE AND OBJECTIVE BOX
SURGERY PROGRESS HPI:  POD#7  Pt seen and examined at bedside. Post-op pain is well controlled on pain medication. Pt denies complaints. No acute events overnight. Pt tolerating clear liquid diet. Pt denies nausea and vomiting. Passed flatus overnight. Denies BM. Voiding well. Pt denies chest pain, SOB, dizziness, fever, chills. Ambulating.    Vital Signs Last 24 Hrs  T(C): 36.9 (17 Sep 2020 06:14), Max: 37.3 (16 Sep 2020 16:34)  T(F): 98.5 (17 Sep 2020 06:14), Max: 99.1 (16 Sep 2020 16:34)  HR: 82 (17 Sep 2020 06:14) (77 - 82)  BP: 137/77 (17 Sep 2020 06:14) (137/77 - 168/91)  BP(mean): --  RR: 18 (17 Sep 2020 06:14) (17 - 18)  SpO2: 99% (17 Sep 2020 06:14) (97% - 100%)      PHYSICAL EXAM:    GENERAL: NAD  CHEST/LUNG: Clear to ausculation, bilaterally   HEART: RRR S1S2  ABDOMEN: Steri strips clean/dry/intact. non distended, +BS, soft, appropriate incisional tenderness  EXTREMITIES:  calf soft, non tender b/l    I&O's Detail    15 Sep 2020 07:01  -  16 Sep 2020 07:00  --------------------------------------------------------  IN:  Total IN: 0 mL    OUT:    Nasogastric/Oral tube (mL): 100 mL  Total OUT: 100 mL    Total NET: -100 mL      16 Sep 2020 07:01  -  17 Sep 2020 06:18  --------------------------------------------------------  IN:    dextrose 5% + sodium chloride 0.45%: 980 mL    Fat Emulsion (Plant Based) 20%: 124.8 mL    Octreotide: 60 mL    Oral Fluid: 320 mL  Total IN: 1484.8 mL    OUT:    Nasogastric/Oral tube (mL): 100 mL  Total OUT: 100 mL    Total NET: 1384.8 mL          LABS:                        9.1    3.64  )-----------( 148      ( 16 Sep 2020 07:02 )             28.6     09-16    141  |  107  |  2<L>  ----------------------------<  114<H>  3.8   |  30  |  0.54    Ca    9.1      16 Sep 2020 07:02  Phos  2.7     09-16  Mg     2.1     09-16        Assessment: 61 y/o female with PMHx breast CA diagnosed in 2017 (s/p bilateral mastectomy and breast reconstruction), abdominal carcinomatosis (s/p chemo/radiation 12/19 - 4/20 and hysterectomy 10/2019), HTN, GERD, sleep apnea admitted with recurrent SBO, Malignant SBO protocol. S/P Diagnostic laparoscopy, ex lap, SHARIFA POD#7.  +flatus    Plan:  -Continue clear liquid diet  -PPN per GI. F/u AM labs   -continue OOB/Ambulation, PT  -Pain management PRN; Tylenol/Toradol ATC, limit narcotics   -Continue Reglan/Octreotide.  -Medical/onc f/u.  -DVT prophylaxis, incentive spirometer  -Will d/w Surgical attending

## 2020-09-17 NOTE — PROGRESS NOTE ADULT - ASSESSMENT
HPI:  59 y/o female with PMHx breast CA diagnosed in 2017 (s/p bilateral mastectomy and breast reconstruction), abdominal carcinomatosis (s/p chemo/radiation 12/19 - 4/20 and hysterectomy 10/2019), HTN, GERD, sleep apnea   now presents to the ED accompanied by her daughter c/o worsening abdominal pain of two days duration, described as severe, 12/10 pain, crampy, nonradiating periumbilical, and worse at night/with lying down. She is known to service and was recently discharged last week following conservative tx for SBO. She reports she vomited twice yesterday, NBNB. Pt admits she used miralax and an enema and had a BM this am. Pain is similar to last week. No nausea/vomiting at present. Patient denies fever, chills, diarrhea, melena, hematochezia, dysuria, hematuria, chest pain, shortness of breath, dizziness, cough. She also complains of back pain; ambulates independently at home. (07 Sep 2020 14:59)  ----------- As Above -------------------  Consult called for nutrition. Patient with history of SBO last week treated supportively presented with SBO. On 9/10/20 patient had lysis of adhesions. Unfortunately, patient has not been passing flatus / BM  NGT (+). Patient did pass flatus 2:20PM today  History of hysterectomy / carcinomatosis ( breast )   Last colonoscopy was 2016.    SBO s/p lysis of adhesions with prolonged NPO status. NGT removed. Tolerating clear liquids. Will keep  PPN at 42 ml / hr for today. Will taper and d/c once advanced diet is tolerated..

## 2020-09-18 LAB
ANION GAP SERPL CALC-SCNC: 7 MMOL/L — SIGNIFICANT CHANGE UP (ref 5–17)
BUN SERPL-MCNC: 4 MG/DL — LOW (ref 7–23)
CALCIUM SERPL-MCNC: 9.1 MG/DL — SIGNIFICANT CHANGE UP (ref 8.5–10.1)
CHLORIDE SERPL-SCNC: 107 MMOL/L — SIGNIFICANT CHANGE UP (ref 96–108)
CO2 SERPL-SCNC: 28 MMOL/L — SIGNIFICANT CHANGE UP (ref 22–31)
CREAT SERPL-MCNC: 0.72 MG/DL — SIGNIFICANT CHANGE UP (ref 0.5–1.3)
GLUCOSE SERPL-MCNC: 116 MG/DL — HIGH (ref 70–99)
HCT VFR BLD CALC: 32.8 % — LOW (ref 34.5–45)
HGB BLD-MCNC: 10.8 G/DL — LOW (ref 11.5–15.5)
MAGNESIUM SERPL-MCNC: 2.1 MG/DL — SIGNIFICANT CHANGE UP (ref 1.6–2.6)
MCHC RBC-ENTMCNC: 32.9 GM/DL — SIGNIFICANT CHANGE UP (ref 32–36)
MCHC RBC-ENTMCNC: 33.5 PG — SIGNIFICANT CHANGE UP (ref 27–34)
MCV RBC AUTO: 101.9 FL — HIGH (ref 80–100)
NRBC # BLD: 0 /100 WBCS — SIGNIFICANT CHANGE UP (ref 0–0)
PHOSPHATE SERPL-MCNC: 2.9 MG/DL — SIGNIFICANT CHANGE UP (ref 2.5–4.5)
PLATELET # BLD AUTO: 195 K/UL — SIGNIFICANT CHANGE UP (ref 150–400)
POTASSIUM SERPL-MCNC: 3.5 MMOL/L — SIGNIFICANT CHANGE UP (ref 3.5–5.3)
POTASSIUM SERPL-SCNC: 3.5 MMOL/L — SIGNIFICANT CHANGE UP (ref 3.5–5.3)
RBC # BLD: 3.22 M/UL — LOW (ref 3.8–5.2)
RBC # FLD: 14.9 % — HIGH (ref 10.3–14.5)
SODIUM SERPL-SCNC: 142 MMOL/L — SIGNIFICANT CHANGE UP (ref 135–145)
WBC # BLD: 4.34 K/UL — SIGNIFICANT CHANGE UP (ref 3.8–10.5)
WBC # FLD AUTO: 4.34 K/UL — SIGNIFICANT CHANGE UP (ref 3.8–10.5)

## 2020-09-18 PROCEDURE — 99232 SBSQ HOSP IP/OBS MODERATE 35: CPT

## 2020-09-18 RX ORDER — LISINOPRIL 2.5 MG/1
20 TABLET ORAL DAILY
Refills: 0 | Status: DISCONTINUED | OUTPATIENT
Start: 2020-09-18 | End: 2020-09-21

## 2020-09-18 RX ORDER — METOCLOPRAMIDE HCL 10 MG
10 TABLET ORAL EVERY 8 HOURS
Refills: 0 | Status: COMPLETED | OUTPATIENT
Start: 2020-09-18 | End: 2020-09-21

## 2020-09-18 RX ORDER — ACETAMINOPHEN 500 MG
650 TABLET ORAL EVERY 6 HOURS
Refills: 0 | Status: DISCONTINUED | OUTPATIENT
Start: 2020-09-18 | End: 2020-09-21

## 2020-09-18 RX ORDER — POLYETHYLENE GLYCOL 3350 17 G/17G
17 POWDER, FOR SOLUTION ORAL DAILY
Refills: 0 | Status: DISCONTINUED | OUTPATIENT
Start: 2020-09-18 | End: 2020-09-21

## 2020-09-18 RX ORDER — DEXTROSE 10 % IN WATER 10 %
1000 INTRAVENOUS SOLUTION INTRAVENOUS
Refills: 0 | Status: DISCONTINUED | OUTPATIENT
Start: 2020-09-18 | End: 2020-09-21

## 2020-09-18 RX ADMIN — HEPARIN SODIUM 5000 UNIT(S): 5000 INJECTION INTRAVENOUS; SUBCUTANEOUS at 21:17

## 2020-09-18 RX ADMIN — LISINOPRIL 20 MILLIGRAM(S): 2.5 TABLET ORAL at 12:41

## 2020-09-18 RX ADMIN — OCTREOTIDE ACETATE 5 MICROGRAM(S)/HR: 200 INJECTION, SOLUTION INTRAVENOUS; SUBCUTANEOUS at 15:07

## 2020-09-18 RX ADMIN — Medication 10 MILLIGRAM(S): at 14:27

## 2020-09-18 RX ADMIN — Medication 20 MILLILITER(S): at 22:25

## 2020-09-18 RX ADMIN — SODIUM CHLORIDE 80 MILLILITER(S): 9 INJECTION, SOLUTION INTRAVENOUS at 05:09

## 2020-09-18 RX ADMIN — HEPARIN SODIUM 5000 UNIT(S): 5000 INJECTION INTRAVENOUS; SUBCUTANEOUS at 14:27

## 2020-09-18 RX ADMIN — Medication 10 MILLIGRAM(S): at 21:23

## 2020-09-18 RX ADMIN — POLYETHYLENE GLYCOL 3350 17 GRAM(S): 17 POWDER, FOR SOLUTION ORAL at 12:41

## 2020-09-18 RX ADMIN — HEPARIN SODIUM 5000 UNIT(S): 5000 INJECTION INTRAVENOUS; SUBCUTANEOUS at 05:09

## 2020-09-18 RX ADMIN — CHLORHEXIDINE GLUCONATE 1 APPLICATION(S): 213 SOLUTION TOPICAL at 05:09

## 2020-09-18 NOTE — PROGRESS NOTE ADULT - SUBJECTIVE AND OBJECTIVE BOX
CHIEF COMPLAINT: s/p exploratory laparotomy with bowel resection.   tolerating liquid diet and PPN  no n/v        PHYSICAL EXAM:    GENERAL: moderately malnourished.   CHEST/LUNG: Clear to ausculation bilaterally, no wheezing, no crackles   HEART: Regular rate and rhythm; No murmurs, rubs  ABDOMEN: + BS/ midline dressing over surgical wound. + NGT out   EXTREMITIES:  Moving all four extremities spontaneously, No clubbing, cyanosis, or edema  NERVOUS SYSTEM:  Grossly non focal.  Psychiatry: AAO x 3, mood is appropriate       OBJECTIVE DATA:     Vital Signs Last 24 Hrs  T(C): 36.9 (18 Sep 2020 10:36), Max: 36.9 (18 Sep 2020 05:16)  T(F): 98.4 (18 Sep 2020 10:36), Max: 98.4 (18 Sep 2020 05:16)  HR: 80 (18 Sep 2020 10:36) (73 - 84)  BP: 168/79 (18 Sep 2020 10:36) (149/84 - 196/94)  BP(mean): 120 (18 Sep 2020 10:36) (120 - 120)  RR: 18 (18 Sep 2020 10:36) (18 - 19)  SpO2: 98% (18 Sep 2020 10:36) (98% - 99%)           Daily     Daily Weight in k.7 (18 Sep 2020 05:16)  LABS:                        10.8   4.34  )-----------( 195      ( 18 Sep 2020 07:48 )             32.8             09-18    142  |  107  |  4<L>  ----------------------------<  116<H>  3.5   |  28  |  0.72    Ca    9.1      18 Sep 2020 07:48  Phos  2.9     09-18  Mg     2.1     09-18    TPro  6.4  /  Alb  2.5<L>  /  TBili  0.5  /  DBili  x   /  AST  24  /  ALT  25  /  AlkPhos  101  09-17                          MEDICATIONS  (STANDING):  chlorhexidine 4% Liquid 1 Application(s) Topical <User Schedule>  dextrose 10%. 1000 milliLiter(s) (20 mL/Hr) IV Continuous <Continuous>  dextrose 5% + sodium chloride 0.45%. 1000 milliLiter(s) (80 mL/Hr) IV Continuous <Continuous>  heparin   Injectable 5000 Unit(s) SubCutaneous every 8 hours  lisinopril 20 milliGRAM(s) Oral daily  metoclopramide Injectable 10 milliGRAM(s) IV Push every 8 hours  octreotide  Infusion 25 MICROgram(s)/Hr (5 mL/Hr) IV Continuous <Continuous>  Parenteral Nutrition - Adult 1 Each (42 mL/Hr) TPN Continuous <Continuous>  polyethylene glycol 3350 17 Gram(s) Oral daily    MEDICATIONS  (PRN):  acetaminophen   Tablet .. 650 milliGRAM(s) Oral every 6 hours PRN Mild Pain (1 - 3)  ketorolac   Injectable 15 milliGRAM(s) IV Push every 6 hours PRN Moderate Pain (4 - 6)

## 2020-09-18 NOTE — PROGRESS NOTE ADULT - SUBJECTIVE AND OBJECTIVE BOX
Patient seen and examined at bedside with no complaints.   Patient reports feeling well this morning, with only minimal pain around her incision when moving.   Denies nausea, vomiting, chest pain, SOB.  Continues to pass gas.  No BM yet.   Urinating without issues.    PPN. Pureed diet.   Pt reports +chills yesterday evening, as well as the previous evening.      Vital Signs Last 24 Hrs  T(F): 98.4 (09-18-20 @ 05:16), Max: 98.9 (09-17-20 @ 11:00)  HR: 78 (09-18-20 @ 05:16)  BP: 149/84 (09-18-20 @ 05:16)  RR: 19 (09-18-20 @ 05:16)  SpO2: 98% (09-18-20 @ 05:16)      GENERAL: Alert, NAD  CHEST/LUNG: Respirations nonlabored  HEART: Regular rate and rhythm  ABDOMEN: Midline incision c/d with steri-strips intact. +Bowel sounds, soft, Nontender, Nondistended, no rebound or guarding  EXTREMITIES:  no calf tenderness, No edema    I&O's Detail    16 Sep 2020 07:01  -  17 Sep 2020 07:00  --------------------------------------------------------  IN:    dextrose 5% + sodium chloride 0.45%: 980 mL    Fat Emulsion (Plant Based) 20%: 124.8 mL    Octreotide: 60 mL    Oral Fluid: 320 mL  Total IN: 1484.8 mL    OUT:    Nasogastric/Oral tube (mL): 100 mL  Total OUT: 100 mL    Total NET: 1384.8 mL      17 Sep 2020 07:01  -  18 Sep 2020 06:26  --------------------------------------------------------  IN:    dextrose 5% + sodium chloride 0.45%: 1920 mL    Octreotide: 120 mL    Oral Fluid: 570 mL    PPN (Peripheral Parenteral Nutrition): 1004 mL  Total IN: 3614 mL    OUT:    Voided (mL): 3 mL  Total OUT: 3 mL    Total NET: 3611 mL          LABS:                        10.0   3.70  )-----------( 168      ( 17 Sep 2020 07:40 )             30.5     09-17    143  |  109<H>  |  3<L>  ----------------------------<  119<H>  3.5   |  29  |  0.68    Ca    8.8      17 Sep 2020 07:40  Phos  2.8     09-17  Mg     2.1     09-17    TPro  6.4  /  Alb  2.5<L>  /  TBili  0.5  /  DBili  x   /  AST  24  /  ALT  25  /  AlkPhos  101  09-17      Impression:  61 y/o female with PMHx breast CA diagnosed in 2017 (s/p bilateral mastectomy and breast reconstruction), abdominal carcinomatosis (s/p chemo/radiation 12/19 - 4/20 and hysterectomy 10/2019), HTN, GERD, sleep apnea admitted with recurrent SBO, Malignant SBO protocol. S/P Diagnostic laparoscopy, ex lap, SHAIRFA POD#8. +flatus.    Plan   -Pureed diet, taper PPN per GI when tolerating regular.   -Monitor bowel function.   -Local wound care as needed.   -Pain management with Tylenol/Toradol.   -Reglan/Octreotide.   -DVT prophylaxis, encouraged OOB/ambulating with PT.   -Will discuss with surgical attending.

## 2020-09-18 NOTE — PROGRESS NOTE ADULT - SUBJECTIVE AND OBJECTIVE BOX
lying in bed    Vital Signs Last 24 Hrs  T(C): 36.9 (18 Sep 2020 05:16), Max: 37.2 (17 Sep 2020 11:00)  T(F): 98.4 (18 Sep 2020 05:16), Max: 98.9 (17 Sep 2020 11:00)  HR: 78 (18 Sep 2020 05:16) (73 - 87)  BP: 149/84 (18 Sep 2020 05:16) (149/84 - 196/94)  BP(mean): --  RR: 19 (18 Sep 2020 05:16) (17 - 19)  SpO2: 98% (18 Sep 2020 05:16) (98% - 99%)    PHYSICAL EXAM:    general - AAO x 3  HEENT - No Icterus  CVS - RRR  RS - AE B/L  Abd - soft, NT  Ext - Pulses +        LABS:                        10.8   4.34  )-----------( 195      ( 18 Sep 2020 07:48 )             32.8     09-18    142  |  107  |  4<L>  ----------------------------<  116<H>  3.5   |  28  |  0.72    Ca    9.1      18 Sep 2020 07:48  Phos  2.9     09-18  Mg     2.1     09-18    TPro  6.4  /  Alb  2.5<L>  /  TBili  0.5  /  DBili  x   /  AST  24  /  ALT  25  /  AlkPhos  101  09-17            RADIOLOGY & ADDITIONAL STUDIES:

## 2020-09-18 NOTE — PROGRESS NOTE ADULT - ASSESSMENT
61 yo female with PMHx breast CA diagnosed in 2017 (s/p bilateral mastectomy and breast reconstruction), abdominal carcinomatosis (s/p chemo/radiation 12/19 - 4/20 and hysterectomy 10/2019), HTN, GERD, sleep apnea p/w recurrent SBO.    Mechanical SBO. S/p laparotomy and bowel resection. abd pain off and on. Passing gas. CT body if surgery ok.     Hypokalemia. Repleted    hypophosphatemia. low. repleted.     HTN  - uncontrolled. increase lisinopril and monitor.      GERD  - c/w IV Protonix    Macrocytosis. Unremarkable vitamin B12 and folate level.     Moderate protein calorie malnutrition. nutritional consult recs appreciated.

## 2020-09-18 NOTE — MEDICAL STUDENT PROGRESS NOTE(EDUCATION) - SUBJECTIVE AND OBJECTIVE BOX
GENERAL SURGERY PROGRESS NOTE    Patient: ERLIN SCHMIDT , 60y ([birth year])Female   MRN: [id number]  Location: [facility] 2C [address]  Visit: 09-07-20 Inpatient  Date: 09-18-20 @ 15:55    Post-Op Day #: 8    Overnight, no events. no abdominal pain unless the patient is moving around the bed in which she notes some pain around the incision. There is No n/v. The patient reports no fever but reported some chills which improved with acetaminophen. She is OOB and ambulating. There is flatus but no BM yet. The patient is urinating and is tolerating pureed diet and parenteral nutrition.    PAST MEDICAL & SURGICAL HISTORY:  Essential hypertension  Cancer of breast  Rt. Female  Sleep apnea  Use Oral Device  Obesity (BMI 30.0-34.9)  Seasonal allergies  acid reflux  S/P hysterectomy  S/P breast reconstruction, bilateral    S/P bilateral mastectomy    Knee injury  Left &amp; had surgery about 30 years ago        Vitals: T(F): 98.4 (09-18-20 @ 10:36), Max: 98.4 (09-18-20 @ 05:16)  HR: 87 (09-18-20 @ 15:05)  BP: 158/95 (09-18-20 @ 15:05)  RR: 18 (09-18-20 @ 15:05)  SpO2: 98% (09-18-20 @ 15:05)      Pain (0-10):   1         Pain Control Adequate: [x] YES [] N    Diet, Soft:   Fiber/Residue Restricted      09-17-20 @ 07:01  -  09-18-20 @ 07:00  --------------------------------------------------------  IN:    dextrose 5% + sodium chloride 0.45%: 1920 mL    Octreotide: 120 mL    Oral Fluid: 570 mL    PPN (Peripheral Parenteral Nutrition): 1004 mL  Total IN: 3614 mL    OUT:    Voided (mL): 3 mL  Total OUT: 3 mL    Total NET: 3611 mL        Bowel Movement: : NO  Flatus: : YES  PHYSICAL EXAM  GEN: resting comfortably in bed with in NAD  CV: RRR no murmur  LUNGS: CTAB  ABD: midline incision is healing well, clean dry and intact with no bleeding. steri strips are in place. Abdomen is nontender, soft. no distention. bowel sounds are present. no rebound or guarding.  EXT: well perfused      MEDICATIONS  (STANDING):  chlorhexidine 4% Liquid 1 Application(s) Topical <User Schedule>  dextrose 10%. 1000 milliLiter(s) (20 mL/Hr) IV Continuous <Continuous>  dextrose 5% + sodium chloride 0.45%. 1000 milliLiter(s) (80 mL/Hr) IV Continuous <Continuous>  heparin   Injectable 5000 Unit(s) SubCutaneous every 8 hours  lisinopril 20 milliGRAM(s) Oral daily  metoclopramide Injectable 10 milliGRAM(s) IV Push every 8 hours  octreotide  Infusion 25 MICROgram(s)/Hr (5 mL/Hr) IV Continuous <Continuous>  Parenteral Nutrition - Adult 1 Each (42 mL/Hr) TPN Continuous <Continuous>  polyethylene glycol 3350 17 Gram(s) Oral daily    MEDICATIONS  (PRN):  acetaminophen   Tablet .. 650 milliGRAM(s) Oral every 6 hours PRN Mild Pain (1 - 3)  ketorolac   Injectable 15 milliGRAM(s) IV Push every 6 hours PRN Moderate Pain (4 - 6)      DVT PROPHYLAXIS: [x] YES [] NO   GI PROPHYLAXIS: [] YES [] NO   ANTICOAGULATION: [x] YES [] NO   ANTIBIOTICS: [] YES [x] NO       LAB/STUDIES:  CAPILLARY BLOOD GLUCOSE                              10.8   4.34  )-----------( 195      ( 18 Sep 2020 07:48 )             32.8     09-18    142  |  107  |  4<L>  ----------------------------<  116<H>  3.5   |  28  |  0.72    Ca    9.1      18 Sep 2020 07:48  Phos  2.9     09-18  Mg     2.1     09-18    TPro  6.4  /  Alb  2.5<L>  /  TBili  0.5  /  DBili  x   /  AST  24  /  ALT  25  /  AlkPhos  101  09-17               6.4  | 0.5  | 24       ------------------[101     ( 17 Sep 2020 07:40 )  2.5  | x    | 25          Lipase:x      Amylase:x                    IMAGING:    Assessment:  60yFemale patient admitted with malignant SBO, POD 8 s/p exploratory laparoscopy with ex lap and SHARIFA, on malignant small bowel obstruction protocol, advancing diet and passing flatus.    Plan:  pureed diet and advanced to full diet as tolerated  monitor bowel function  wound care  pain management with tylenol and toradol  malignant SBO protocol with metochlopramide and octreotide  DVT prophylaxis with heparin  OOB and ambulation  d/u daily labs    Date/Time: 09-18-20 @ 15:55

## 2020-09-18 NOTE — PROGRESS NOTE ADULT - ASSESSMENT
HPI:  59 y/o female with PMHx breast CA diagnosed in 2017 (s/p bilateral mastectomy and breast reconstruction), abdominal carcinomatosis (s/p chemo/radiation 12/19 - 4/20 and hysterectomy 10/2019), HTN, GERD, sleep apnea   now presents to the ED accompanied by her daughter c/o worsening abdominal pain of two days duration, described as severe, 12/10 pain, crampy, nonradiating periumbilical, and worse at night/with lying down. She is known to service and was recently discharged last week following conservative tx for SBO. She reports she vomited twice yesterday, NBNB. Pt admits she used miralax and an enema and had a BM this am. Pain is similar to last week. No nausea/vomiting at present. Patient denies fever, chills, diarrhea, melena, hematochezia, dysuria, hematuria, chest pain, shortness of breath, dizziness, cough. She also complains of back pain; ambulates independently at home. (07 Sep 2020 14:59)  ----------- As Above -------------------  Consult called for nutrition. Patient with history of SBO last week treated supportively presented with SBO. On 9/10/20 patient had lysis of adhesions. Unfortunately, patient has not been passing flatus / BM  NGT (+). Patient did pass flatus 2:20PM today  History of hysterectomy / carcinomatosis ( breast )   Last colonoscopy was 2016.    SBO s/p lysis of adhesions with prolonged NPO status. NGT removed. Tolerating puree diet.  Will taper and d/c  PPN  today. Will follow on a PRN basis.

## 2020-09-18 NOTE — PROGRESS NOTE ADULT - SUBJECTIVE AND OBJECTIVE BOX
Patient is a 60y old  Female who presents with a chief complaint of Abdominal pain (18 Sep 2020 06:25)      HPI:  59 y/o female with PMHx breast CA diagnosed in 2017 (s/p bilateral mastectomy and breast reconstruction), abdominal carcinomatosis (s/p chemo/radiation 12/19 - 4/20 and hysterectomy 10/2019), HTN, GERD, sleep apnea   now presents to the ED accompanied by her daughter c/o worsening abdominal pain of two days duration, described as severe, 12/10 pain, crampy, nonradiating periumbilical, and worse at night/with lying down. She is known to service and was recently discharged last week following conservative tx for SBO. She reports she vomited twice yesterday, NBNB. Pt admits she used miralax and an enema and had a BM this am. Pain is similar to last week. No nausea/vomiting at present. Patient denies fever, chills, diarrhea, melena, hematochezia, dysuria, hematuria, chest pain, shortness of breath, dizziness, cough. She also complains of back pain; ambulates independently at home. (07 Sep 2020 14:59)      INTERVAL HPI/OVERNIGHT EVENTS:  Mild pain at incision site. Tolerating puree diet. Flatus (+) BM (-) The patient denies melena, hematochezia, hematemesis, nausea, vomiting, abdominal pain,, diarrhea, or change in bowel movements     MEDICATIONS  (STANDING):  chlorhexidine 4% Liquid 1 Application(s) Topical <User Schedule>  dextrose 5% + sodium chloride 0.45%. 1000 milliLiter(s) (80 mL/Hr) IV Continuous <Continuous>  heparin   Injectable 5000 Unit(s) SubCutaneous every 8 hours  lisinopril 20 milliGRAM(s) Oral daily  octreotide  Infusion 25 MICROgram(s)/Hr (5 mL/Hr) IV Continuous <Continuous>  Parenteral Nutrition - Adult 1 Each (42 mL/Hr) TPN Continuous <Continuous>    MEDICATIONS  (PRN):  ketorolac   Injectable 15 milliGRAM(s) IV Push every 6 hours PRN Moderate Pain (4 - 6)      FAMILY HISTORY:      Allergies    No Known Allergies    Intolerances        PMH/PSH:  Essential hypertension    Cancer of breast    Sleep apnea    Obesity (BMI 30.0-34.9)    Seasonal allergies    Acid reflux    Hypertension    S/P hysterectomy    S/P breast reconstruction, bilateral    S/P bilateral mastectomy    Knee injury          REVIEW OF SYSTEMS:  CONSTITUTIONAL: No fever, weight loss,   EYES: No eye pain, visual disturbances, or discharge  ENMT:  No difficulty hearing, tinnitus, vertigo; No sinus or throat pain  NECK: No pain or stiffness  BREASTS: No pain, masses, or nipple discharge  RESPIRATORY: No cough, wheezing, chills or hemoptysis; No shortness of breath  CARDIOVASCULAR: No chest pain, palpitations, dizziness, or leg swelling  GASTROINTESTINAL: See above  GENITOURINARY: No dysuria, frequency, hematuria, or incontinence  NEUROLOGICAL: No headaches, memory loss, loss of strength, numbness, or tremors  SKIN: No itching, burning, rashes, or lesions   LYMPH NODES: No enlarged glands  ENDOCRINE: No heat or cold intolerance; No hair loss  MUSCULOSKELETAL: No joint pain or swelling; No muscle, back, or extremity pain  PSYCHIATRIC: No depression, anxiety, mood swings, or difficulty sleeping  HEME/LYMPH: No easy bruising, or bleeding gums  ALLERGY AND IMMUNOLOGIC: No hives or eczema    Vital Signs Last 24 Hrs  T(C): 36.9 (18 Sep 2020 05:16), Max: 37.2 (17 Sep 2020 11:00)  T(F): 98.4 (18 Sep 2020 05:16), Max: 98.9 (17 Sep 2020 11:00)  HR: 78 (18 Sep 2020 05:16) (73 - 87)  BP: 149/84 (18 Sep 2020 05:16) (149/84 - 196/94)  BP(mean): --  RR: 19 (18 Sep 2020 05:16) (17 - 19)  SpO2: 98% (18 Sep 2020 05:16) (98% - 99%)    PHYSICAL EXAM:  GENERAL: NAD, well-groomed, well-developed  HEAD:  Atraumatic, Normocephalic  EYES: EOMI, PERRLA, conjunctiva and sclera clear  NECK: Supple, No JVD, Normal thyroid  NERVOUS SYSTEM:  Alert & Oriented X3, Good concentration; Motor Strength 5/5 B/L upper and lower extremities;   CHEST/LUNG: Clear to percussion bilaterally; No rales, rhonchi, wheezing, or rubs  HEART: Regular rate and rhythm; No murmurs, rubs, or gallops  ABDOMEN: Soft, Nontender, Nondistended; Bowel sounds present  EXTREMITIES:  2+ Peripheral Pulses, No clubbing, cyanosis, or edema  LYMPH: No lymphadenopathy noted  SKIN: No rashes or lesions    LAB                          10.8   4.34  )-----------( 195      ( 18 Sep 2020 07:48 )             32.8       CBC:  09-18 @ 07:48  WBC 4.34   Hgb 10.8   Hct 32.8   Plts 195  .9  09-17 @ 07:40  WBC 3.70   Hgb 10.0   Hct 30.5   Plts 168  .0  09-16 @ 07:02  WBC 3.64   Hgb 9.1   Hct 28.6   Plts 148  .5  09-15 @ 07:41  WBC 4.22   Hgb 10.5   Hct 31.0   Plts 151  MCV 99.7  09-14 @ 10:40  WBC 4.57   Hgb 11.5   Hct 34.1   Plts 172  MCV 99.7  09-13 @ 07:13  WBC 6.26   Hgb 10.5   Hct 31.9   Plts 158  .3  09-12 @ 08:07  WBC 7.04   Hgb 11.2   Hct 34.4   Plts 152  .5      Chemistry:  09-18 @ 07:48  Na+ 142  K+ 3.5  Cl- 107  CO2 28  BUN 4  Cr 0.72     09-17 @ 07:40  Na+ 143  K+ 3.5  Cl- 109  CO2 29  BUN 3  Cr 0.68     09-16 @ 07:02  Na+ 141  K+ 3.8  Cl- 107  CO2 30  BUN 2  Cr 0.54     09-15 @ 07:41  Na+ 141  K+ 3.6  Cl- 106  CO2 30  BUN 1  Cr 0.65     09-14 @ 10:40  Na+ 139  K+ 3.4  Cl- 102  CO2 31  BUN 2  Cr 0.61     09-13 @ 07:13  Na+ 137  K+ 3.3  Cl- 99  CO2 29  BUN 6  Cr 0.55     09-12 @ 08:07  Na+ 140  K+ 3.6  Cl- 105  CO2 31  BUN 8  Cr 0.71     09-11 @ 17:32  Na+ 139  K+ 3.8  Cl- 102  CO2 32  BUN 8  Cr 0.84         Glucose, Serum: 116 mg/dL (09-18 @ 07:48)  Glucose, Serum: 119 mg/dL (09-17 @ 07:40)  Glucose, Serum: 114 mg/dL (09-16 @ 07:02)  Glucose, Serum: 128 mg/dL (09-15 @ 07:41)  Glucose, Serum: 128 mg/dL (09-14 @ 10:40)  Glucose, Serum: 70 mg/dL (09-13 @ 07:13)  Glucose, Serum: 67 mg/dL (09-12 @ 08:07)  Glucose, Serum: 95 mg/dL (09-11 @ 17:32)      18 Sep 2020 07:48    142    |  107    |  4      ----------------------------<  116    3.5     |  28     |  0.72   17 Sep 2020 07:40    143    |  109    |  3      ----------------------------<  119    3.5     |  29     |  0.68   16 Sep 2020 07:02    141    |  107    |  2      ----------------------------<  114    3.8     |  30     |  0.54   15 Sep 2020 07:41    141    |  106    |  1      ----------------------------<  128    3.6     |  30     |  0.65   14 Sep 2020 10:40    139    |  102    |  2      ----------------------------<  128    3.4     |  31     |  0.61   13 Sep 2020 07:13    137    |  99     |  6      ----------------------------<  70     3.3     |  29     |  0.55   12 Sep 2020 08:07    140    |  105    |  8      ----------------------------<  67     3.6     |  31     |  0.71     Ca    9.1        18 Sep 2020 07:48  Ca    8.8        17 Sep 2020 07:40  Ca    9.1        16 Sep 2020 07:02  Ca    8.6        15 Sep 2020 07:41  Ca    9.0        14 Sep 2020 10:40  Ca    8.6        13 Sep 2020 07:13  Ca    8.3        12 Sep 2020 08:07  Phos  2.9       18 Sep 2020 07:48  Phos  2.8       17 Sep 2020 07:40  Phos  2.7       16 Sep 2020 07:02  Phos  2.2       15 Sep 2020 07:41  Phos  1.7       14 Sep 2020 10:40  Phos  2.2       13 Sep 2020 07:13  Phos  3.1       12 Sep 2020 08:07  Mg     2.1       18 Sep 2020 07:48  Mg     2.1       17 Sep 2020 07:40  Mg     2.1       16 Sep 2020 07:02  Mg     2.3       15 Sep 2020 07:41  Mg     2.0       14 Sep 2020 10:40  Mg     2.1       13 Sep 2020 07:13  Mg     2.1       12 Sep 2020 08:07    TPro  6.4    /  Alb  2.5    /  TBili  0.5    /  DBili  x      /  AST  24     /  ALT  25     /  AlkPhos  101    17 Sep 2020 07:40  TPro  6.1    /  Alb  2.3    /  TBili  0.7    /  DBili  x      /  AST  17     /  ALT  18     /  AlkPhos  57     13 Sep 2020 07:13  TPro  5.6    /  Alb  2.3    /  TBili  0.7    /  DBili  x      /  AST  8      /  ALT  16     /  AlkPhos  51     12 Sep 2020 08:07              CAPILLARY BLOOD GLUCOSE              RADIOLOGY & ADDITIONAL TESTS:    Imaging Personally Reviewed:  [ ] YES  [ ] NO    Consultant(s) Notes Reviewed:  [ ] YES  [ ] NO    Care Discussed with Consultants/Other Providers [ ] YES  [ ] NO

## 2020-09-18 NOTE — PROGRESS NOTE ADULT - PROBLEM SELECTOR PLAN 1
- advance diet as tolerated  - Full body CT scan with contrast if ok with surgery  - Physical Therapy  - Supportive care

## 2020-09-19 LAB
ANION GAP SERPL CALC-SCNC: 6 MMOL/L — SIGNIFICANT CHANGE UP (ref 5–17)
BUN SERPL-MCNC: 6 MG/DL — LOW (ref 7–23)
CALCIUM SERPL-MCNC: 8.8 MG/DL — SIGNIFICANT CHANGE UP (ref 8.5–10.1)
CHLORIDE SERPL-SCNC: 107 MMOL/L — SIGNIFICANT CHANGE UP (ref 96–108)
CO2 SERPL-SCNC: 29 MMOL/L — SIGNIFICANT CHANGE UP (ref 22–31)
CREAT SERPL-MCNC: 0.74 MG/DL — SIGNIFICANT CHANGE UP (ref 0.5–1.3)
GLUCOSE SERPL-MCNC: 116 MG/DL — HIGH (ref 70–99)
HCT VFR BLD CALC: 27.8 % — LOW (ref 34.5–45)
HGB BLD-MCNC: 9.2 G/DL — LOW (ref 11.5–15.5)
MAGNESIUM SERPL-MCNC: 2 MG/DL — SIGNIFICANT CHANGE UP (ref 1.6–2.6)
MCHC RBC-ENTMCNC: 33.1 GM/DL — SIGNIFICANT CHANGE UP (ref 32–36)
MCHC RBC-ENTMCNC: 33.1 PG — SIGNIFICANT CHANGE UP (ref 27–34)
MCV RBC AUTO: 100 FL — SIGNIFICANT CHANGE UP (ref 80–100)
NRBC # BLD: 0 /100 WBCS — SIGNIFICANT CHANGE UP (ref 0–0)
PHOSPHATE SERPL-MCNC: 3.4 MG/DL — SIGNIFICANT CHANGE UP (ref 2.5–4.5)
PLATELET # BLD AUTO: 187 K/UL — SIGNIFICANT CHANGE UP (ref 150–400)
POTASSIUM SERPL-MCNC: 3.4 MMOL/L — LOW (ref 3.5–5.3)
POTASSIUM SERPL-SCNC: 3.4 MMOL/L — LOW (ref 3.5–5.3)
RBC # BLD: 2.78 M/UL — LOW (ref 3.8–5.2)
RBC # FLD: 15 % — HIGH (ref 10.3–14.5)
SODIUM SERPL-SCNC: 142 MMOL/L — SIGNIFICANT CHANGE UP (ref 135–145)
WBC # BLD: 4.02 K/UL — SIGNIFICANT CHANGE UP (ref 3.8–10.5)
WBC # FLD AUTO: 4.02 K/UL — SIGNIFICANT CHANGE UP (ref 3.8–10.5)

## 2020-09-19 PROCEDURE — 99232 SBSQ HOSP IP/OBS MODERATE 35: CPT

## 2020-09-19 PROCEDURE — 74019 RADEX ABDOMEN 2 VIEWS: CPT | Mod: 26,59

## 2020-09-19 PROCEDURE — 74177 CT ABD & PELVIS W/CONTRAST: CPT | Mod: 26

## 2020-09-19 PROCEDURE — 71260 CT THORAX DX C+: CPT | Mod: 26

## 2020-09-19 RX ORDER — IOHEXOL 300 MG/ML
30 INJECTION, SOLUTION INTRAVENOUS ONCE
Refills: 0 | Status: COMPLETED | OUTPATIENT
Start: 2020-09-19 | End: 2020-09-19

## 2020-09-19 RX ORDER — POTASSIUM CHLORIDE 20 MEQ
40 PACKET (EA) ORAL ONCE
Refills: 0 | Status: COMPLETED | OUTPATIENT
Start: 2020-09-19 | End: 2020-09-19

## 2020-09-19 RX ADMIN — CHLORHEXIDINE GLUCONATE 1 APPLICATION(S): 213 SOLUTION TOPICAL at 05:08

## 2020-09-19 RX ADMIN — Medication 40 MILLIEQUIVALENT(S): at 12:34

## 2020-09-19 RX ADMIN — HEPARIN SODIUM 5000 UNIT(S): 5000 INJECTION INTRAVENOUS; SUBCUTANEOUS at 05:10

## 2020-09-19 RX ADMIN — HEPARIN SODIUM 5000 UNIT(S): 5000 INJECTION INTRAVENOUS; SUBCUTANEOUS at 21:05

## 2020-09-19 RX ADMIN — HEPARIN SODIUM 5000 UNIT(S): 5000 INJECTION INTRAVENOUS; SUBCUTANEOUS at 13:26

## 2020-09-19 RX ADMIN — LISINOPRIL 20 MILLIGRAM(S): 2.5 TABLET ORAL at 05:10

## 2020-09-19 RX ADMIN — OCTREOTIDE ACETATE 5 MICROGRAM(S)/HR: 200 INJECTION, SOLUTION INTRAVENOUS; SUBCUTANEOUS at 13:35

## 2020-09-19 RX ADMIN — Medication 10 MILLIGRAM(S): at 05:09

## 2020-09-19 RX ADMIN — Medication 10 MILLIGRAM(S): at 13:26

## 2020-09-19 RX ADMIN — Medication 10 MILLIGRAM(S): at 21:05

## 2020-09-19 RX ADMIN — IOHEXOL 30 MILLILITER(S): 300 INJECTION, SOLUTION INTRAVENOUS at 13:26

## 2020-09-19 RX ADMIN — POLYETHYLENE GLYCOL 3350 17 GRAM(S): 17 POWDER, FOR SOLUTION ORAL at 12:33

## 2020-09-19 RX ADMIN — SODIUM CHLORIDE 80 MILLILITER(S): 9 INJECTION, SOLUTION INTRAVENOUS at 01:53

## 2020-09-19 NOTE — PROGRESS NOTE ADULT - PROBLEM SELECTOR PLAN 1
- started on soft diet  - CT c/a/p with PO and IV contrast - d/w surgery, look for carcinamoatosis or other evidence of disease  - advance diet as tolerated  - DVT prophylaxsis

## 2020-09-19 NOTE — PROGRESS NOTE ADULT - ATTENDING COMMENTS
Feeling well today. Reports no nausea/emesis, tolerating regular diet, ambulating. Passing flatus, no BM yet.  Abdomen soft, nt, nd, with well-healing midline incision.    Appreciate Heme/onc recs, will proceed with CT imaging. Will pre-hydrate patient with IVF.   C/w diet, TPN support, malignant small bowel protocol, pain control, and bowel regimen.

## 2020-09-19 NOTE — PROGRESS NOTE ADULT - SUBJECTIVE AND OBJECTIVE BOX
Patient: ERLIN SCHMIDT [id number] 60y Female                           Internal Medicine Hospitalist Progress Note    Initial HPI:  No complaints.  No pain.  + Flatus.      ____________________PHYSICAL EXAM:  GENERAL:  NAD Alert and Oriented x 3   HEENT: NCAT  CARDIOVASCULAR:  S1, S2  LUNGS: CTAB  ABDOMEN:  soft, (-) tenderness, (-) distension, (+) bowel sounds, (-) guarding, (-) rebound (-) rigidity.  Healing midline incision.  no erythema / drainage.   EXTREMITIES:  no cyanosis / clubbing / edema.   ____________________     VITALS:  Vital Signs Last 24 Hrs  T(C): 37.1 (19 Sep 2020 11:26), Max: 37.2 (18 Sep 2020 16:06)  T(F): 98.8 (19 Sep 2020 11:26), Max: 98.9 (18 Sep 2020 16:06)  HR: 91 (19 Sep 2020 11:) (77 - 91)  BP: 142/85 (19 Sep 2020 11:26) (130/76 - 168/83)  BP(mean): --  RR: 18 (19 Sep 2020 11:26) (16 - 18)  SpO2: 98% (19 Sep 2020 11:26) (96% - 98%) Daily     Daily Weight in k.2 (19 Sep 2020 05:05)  CAPILLARY BLOOD GLUCOSE        I&O's Summary    18 Sep 2020 07:01  -  19 Sep 2020 07:00  --------------------------------------------------------  IN: 380 mL / OUT: 0 mL / NET: 380 mL          BACKGROUND:  HEALTH ISSUES - PROBLEM Dx:  Uterine cancer  Uterine cancer          Allergies    No Known Allergies    Intolerances      PAST MEDICAL & SURGICAL HISTORY:  Essential hypertension    Cancer of breast  Rt. Female    Sleep apnea  Use Oral Device    Obesity (BMI 30.0-34.9)    Seasonal allergies    Acid reflux    S/P hysterectomy    S/P breast reconstruction, bilateral    S/P bilateral mastectomy    Knee injury  Left &amp; had surgery about 30 years ago          LABS:                        9.2    4.02  )-----------( 187      ( 19 Sep 2020 08:26 )             27.8     09-19    142  |  107  |  6<L>  ----------------------------<  116<H>  3.4<L>   |  29  |  0.74    Ca    8.8      19 Sep 2020 08:26  Phos  3.4     -  Mg     2.0                         MEDICATIONS:  MEDICATIONS  (STANDING):  chlorhexidine 4% Liquid 1 Application(s) Topical <User Schedule>  dextrose 10%. 1000 milliLiter(s) (20 mL/Hr) IV Continuous <Continuous>  dextrose 5% + sodium chloride 0.45%. 1000 milliLiter(s) (80 mL/Hr) IV Continuous <Continuous>  heparin   Injectable 5000 Unit(s) SubCutaneous every 8 hours  iohexol 300 mG (iodine)/mL Oral Solution 30 milliLiter(s) Oral once  lisinopril 20 milliGRAM(s) Oral daily  metoclopramide Injectable 10 milliGRAM(s) IV Push every 8 hours  octreotide  Infusion 25 MICROgram(s)/Hr (5 mL/Hr) IV Continuous <Continuous>  polyethylene glycol 3350 17 Gram(s) Oral daily    MEDICATIONS  (PRN):  acetaminophen   Tablet .. 650 milliGRAM(s) Oral every 6 hours PRN Mild Pain (1 - 3)  ketorolac   Injectable 15 milliGRAM(s) IV Push every 6 hours PRN Moderate Pain (4 - 6)

## 2020-09-19 NOTE — PROGRESS NOTE ADULT - SUBJECTIVE AND OBJECTIVE BOX
feels well, started on soft diet    Vital Signs Last 24 Hrs  T(C): 36.8 (19 Sep 2020 05:05), Max: 37.2 (18 Sep 2020 16:06)  T(F): 98.3 (19 Sep 2020 05:05), Max: 98.9 (18 Sep 2020 16:06)  HR: 77 (19 Sep 2020 05:05) (77 - 87)  BP: 130/76 (19 Sep 2020 05:05) (130/76 - 168/83)  BP(mean): --  RR: 18 (19 Sep 2020 05:05) (16 - 18)  SpO2: 98% (19 Sep 2020 05:05) (96% - 98%)    PHYSICAL EXAM:    general - AAO x 3  HEENT - No Icterus  CVS - RRR  RS - AE B/L  Abd - soft, NT  Ext - Pulses +        LABS:                        9.2    4.02  )-----------( 187      ( 19 Sep 2020 08:26 )             27.8     09-19    142  |  107  |  6<L>  ----------------------------<  116<H>  3.4<L>   |  29  |  0.74    Ca    8.8      19 Sep 2020 08:26  Phos  3.4     09-19  Mg     2.0     09-19              RADIOLOGY & ADDITIONAL STUDIES:

## 2020-09-19 NOTE — PROGRESS NOTE ADULT - SUBJECTIVE AND OBJECTIVE BOX
INTERVAL HPI/OVERNIGHT EVENTS:  Pt. seen and examined at bedside resting comfortably. No acute overnight events. Patient offers no complaints, states her pain continues to improve and is well controlled with Tylenol. Denies N/V, tolerating a soft diet. Admits to passing flatus, denies BM yet. Ambulating often and voiding well.  Denies fever/chills, chest pain, dyspnea, cough, dizziness.     Vital Signs Last 24 Hrs  T(C): 36.8 (19 Sep 2020 05:05), Max: 37.2 (18 Sep 2020 16:06)  T(F): 98.3 (19 Sep 2020 05:05), Max: 98.9 (18 Sep 2020 16:06)  HR: 77 (19 Sep 2020 05:05) (77 - 87)  BP: 130/76 (19 Sep 2020 05:05) (130/76 - 168/83)  RR: 18 (19 Sep 2020 05:05) (16 - 18)  SpO2: 98% (19 Sep 2020 05:05) (96% - 98%)    PHYSICAL EXAM:    GENERAL: NAD  CHEST/LUNG: Clear to ausculation, bilaterally   HEART: S1S2  ABDOMEN: Midline wound c/d with steri-strips intact. +Bowel sounds, soft, Nontender, Nondistended, no rebound or guarding  EXTREMITIES:  calf soft, non tender b/l. 2+ distal pulses b/l.     I&O's Detail    18 Sep 2020 07:01  -  19 Sep 2020 07:00  --------------------------------------------------------  IN:    Oral Fluid: 380 mL  Total IN: 380 mL    OUT:  Total OUT: 0 mL    Total NET: 380 mL    LABS:                        9.2    4.02  )-----------( 187      ( 19 Sep 2020 08:26 )             27.8     09-19    142  |  107  |  6<L>  ----------------------------<  116<H>  3.4<L>   |  29  |  0.74    Ca    8.8      19 Sep 2020 08:26  Phos  3.4     09-19  Mg     2.0     09-19      Impression:  61 y/o female with PMHx breast CA diagnosed in 2017 (s/p bilateral mastectomy and breast reconstruction), abdominal carcinomatosis (s/p chemo/radiation 12/19 - 4/20 and hysterectomy 10/2019), HTN, GERD, sleep apnea admitted with recurrent SBO, Malignant SBO protocol. S/P Diagnostic laparoscopy, ex lap, SHARIFA POD#9. +flatus.    Plan   -Hypokalemia - repleted  -Soft diet as tolerated, D/C PPN per GI  -Monitor for GI function.   -Miralax daily  -Local wound care  -Pain management PRN  -C/w Reglan/Octreotide.   -DVT prophylaxis, encouraged OOB/ambulating with PT.   -Continue medical management and supportive care  -Appreciate heme/onc follow up, CT chest/abdomen/pelvis with contrast for staging per Dr. Vital  -D/C planning once patient has a bowel movement  -Will discuss with surgical attending

## 2020-09-19 NOTE — PROGRESS NOTE ADULT - ASSESSMENT
59 yo female with PMHx breast CA diagnosed in 2017 (s/p bilateral mastectomy and breast reconstruction), abdominal carcinomatosis (s/p chemo/radiation 12/19 - 4/20 and hysterectomy 10/2019), HTN, GERD, sleep apnea p/w recurrent SBO.  S/p Laparotomy and bowel resection.      # Mechanical SBO. S/p laparotomy and bowel resection. No pain, reports feeling better.  + Flatus.  Advance diet per surgery.  # h/o Uterine? / Breast CA - Per Onc recommendations, consider CT C /A/P to assess for carcinomatosis.  # Hypokalemia. Repleted  # hypophosphatemia. low. repleted.   # HTN - uncontrolled. increase lisinopril and monitor.    # GERD - c/w IV Protonix  # Macrocytosis. Unremarkable vitamin B12 and folate level.   # Moderate protein calorie malnutrition. nutritional consult recs appreciated.   # DVT prophylaxis - subcutaneous Heparin

## 2020-09-19 NOTE — PROGRESS NOTE ADULT - PROBLEM/PLAN-1
HISTORY OF PRESENT ILLNESS  Miya Quinn is a 75 y.o. female presents to establish care  HPI  Last saw Izzy Kraft, NP about 8 weeks, collaborating physician Pretty Chaney    Blood pressure medication changed 2 months ago from 4 to 1 pill blood pressure 160/80-90    Headache for 2 months right frontal area    Headaches cause blood pressure to go up    Hx of migraine since 22years old associated with photophobia, vomiting, headaches have gotten better    Tylenol #3 effective for headaches    Systolic blood pressure sometimes 200's    CKD,protienuria, anemia and secondary hyperparathyroidism, managed  by nephrologist, Dr. Cat Sandhu, first visit with provider yesterday. Blood pressure medication adjustment  deferred until lab results reviewed. Waiting for call from specialist today. Was seeing Dr. Nadir Colunga when discharged form hospital. Hospitalized in March at [facility] with right side weakness. Diagnosed with CVA. Prior CVA 2012, with no deficits    Daughter reports speech, memory and right upper extremity weakness since hospitalization in March. She has had speech, PT and OT therapy with significant improvement    Psychosocial: lives with daughter, no tobacco or alcohol. + anxiety    Past Medical History:   Diagnosis Date    Cancer ([address]     ovaian stomach    CVA (cerebral vascular accident) ([address]     Heart failure ([address]     MI    Hypertension      Past Surgical History:   Procedure Laterality Date    ABDOMEN SURGERY PROC UNLISTED      cancer removal    HX GYN      hysterectomy    HX LUMBAR LAMINECTOMY  06/29/2016    L4-S1, Dr. Faith Dang Prescriptions on File Prior to Visit   Medication Sig Dispense Refill    nebivolol (BYSTOLIC) 5 mg tablet Take 5 mg by mouth daily. No current facility-administered medications on file prior to visit. Review of Systems   Constitutional: Negative. HENT: Positive for hearing loss. Eyes: Negative. Respiratory: Negative. Cardiovascular: Negative for chest pain. Gastrointestinal: Negative. Genitourinary: Negative. Musculoskeletal: Negative for back pain, myalgias and neck pain. Skin: Negative. Neurological: Positive for sensory change, speech change, focal weakness and headaches. Negative for dizziness, tingling, tremors and seizures. Psychiatric/Behavioral: Positive for memory loss. The patient is nervous/anxious and has insomnia. Poor historian due to impaired memory. Daughter provides clarification and medical records  Physical Exam   Constitutional: She is oriented to person, place, and time. She appears well-developed and well-nourished. No distress. HENT:   Right Ear: External ear normal.   Left Ear: External ear normal.   Nose: Nose normal.   Mouth/Throat: Oropharynx is clear and moist. No oropharyngeal exudate. Neck: Normal range of motion. Neck supple. No JVD present. Carotid bruit is not present. No thyromegaly present. Cardiovascular: Normal rate and regular rhythm. Exam reveals no gallop and no friction rub. No murmur heard. Pulmonary/Chest: Effort normal and breath sounds normal.   Abdominal: Soft. Bowel sounds are normal. She exhibits no distension and no mass. There is no tenderness. There is no rebound and no guarding. Musculoskeletal: She exhibits no edema or tenderness. Lymphadenopathy:     She has no cervical adenopathy. Neurological: She is alert and oriented to person, place, and time. Coordination and gait normal.   Mild right facial droop,     Decrease RUE strength and ROM   Skin: Skin is warm and dry. She is not diaphoretic. Psychiatric: Her behavior is normal. Her mood appears anxious. Her speech is tangential. Cognition and memory are impaired. She exhibits abnormal remote memory. ASSESSMENT and PLAN    ICD-10-CM ICD-9-CM    1. Essential hypertension I10 401.9    2. CKD (chronic kidney disease), stage 3 (moderate) N18.3 585.3    3.  Proteinuria, unspecified type R80.9 791.0    4. Secondary hyperparathyroidism, renal (HCC) N25.81 588.81    5. Chronic intractable headache, unspecified headache type R51 784.0 acetaminophen-codeine (TYLENOL #3) 300-30 mg per tablet   6. History of CVA (cerebrovascular accident) Z86.73 V12.54    7. Hyperparathyroidism, secondary ([facility] [address] N25.81 588.81      Follow-up Disposition:  Return in about 1 week (around 7/7/2017) for htn,headache. Accelerated hypertension, reportedly due to headache.    Defer blood medication adjustment to nephrologist. Daughter strongly encouraged to call if no call received before 12 noon DISPLAY PLAN FREE TEXT

## 2020-09-20 ENCOUNTER — TRANSCRIPTION ENCOUNTER (OUTPATIENT)
Age: 60
End: 2020-09-20

## 2020-09-20 LAB
ANION GAP SERPL CALC-SCNC: 5 MMOL/L — SIGNIFICANT CHANGE UP (ref 5–17)
BUN SERPL-MCNC: 4 MG/DL — LOW (ref 7–23)
CALCIUM SERPL-MCNC: 8.7 MG/DL — SIGNIFICANT CHANGE UP (ref 8.5–10.1)
CHLORIDE SERPL-SCNC: 104 MMOL/L — SIGNIFICANT CHANGE UP (ref 96–108)
CO2 SERPL-SCNC: 31 MMOL/L — SIGNIFICANT CHANGE UP (ref 22–31)
CREAT SERPL-MCNC: 0.62 MG/DL — SIGNIFICANT CHANGE UP (ref 0.5–1.3)
GLUCOSE SERPL-MCNC: 113 MG/DL — HIGH (ref 70–99)
HCT VFR BLD CALC: 26.9 % — LOW (ref 34.5–45)
HGB BLD-MCNC: 8.9 G/DL — LOW (ref 11.5–15.5)
MAGNESIUM SERPL-MCNC: 2.1 MG/DL — SIGNIFICANT CHANGE UP (ref 1.6–2.6)
MCHC RBC-ENTMCNC: 33.1 GM/DL — SIGNIFICANT CHANGE UP (ref 32–36)
MCHC RBC-ENTMCNC: 33.5 PG — SIGNIFICANT CHANGE UP (ref 27–34)
MCV RBC AUTO: 101.1 FL — HIGH (ref 80–100)
NRBC # BLD: 0 /100 WBCS — SIGNIFICANT CHANGE UP (ref 0–0)
PHOSPHATE SERPL-MCNC: 3.2 MG/DL — SIGNIFICANT CHANGE UP (ref 2.5–4.5)
PLATELET # BLD AUTO: 205 K/UL — SIGNIFICANT CHANGE UP (ref 150–400)
POTASSIUM SERPL-MCNC: 3.5 MMOL/L — SIGNIFICANT CHANGE UP (ref 3.5–5.3)
POTASSIUM SERPL-SCNC: 3.5 MMOL/L — SIGNIFICANT CHANGE UP (ref 3.5–5.3)
RBC # BLD: 2.66 M/UL — LOW (ref 3.8–5.2)
RBC # FLD: 14.9 % — HIGH (ref 10.3–14.5)
SODIUM SERPL-SCNC: 140 MMOL/L — SIGNIFICANT CHANGE UP (ref 135–145)
WBC # BLD: 4.37 K/UL — SIGNIFICANT CHANGE UP (ref 3.8–10.5)
WBC # FLD AUTO: 4.37 K/UL — SIGNIFICANT CHANGE UP (ref 3.8–10.5)

## 2020-09-20 PROCEDURE — 99232 SBSQ HOSP IP/OBS MODERATE 35: CPT

## 2020-09-20 RX ADMIN — HEPARIN SODIUM 5000 UNIT(S): 5000 INJECTION INTRAVENOUS; SUBCUTANEOUS at 22:33

## 2020-09-20 RX ADMIN — SODIUM CHLORIDE 80 MILLILITER(S): 9 INJECTION, SOLUTION INTRAVENOUS at 05:13

## 2020-09-20 RX ADMIN — OCTREOTIDE ACETATE 5 MICROGRAM(S)/HR: 200 INJECTION, SOLUTION INTRAVENOUS; SUBCUTANEOUS at 13:32

## 2020-09-20 RX ADMIN — SODIUM CHLORIDE 80 MILLILITER(S): 9 INJECTION, SOLUTION INTRAVENOUS at 22:35

## 2020-09-20 RX ADMIN — Medication 10 MILLIGRAM(S): at 22:33

## 2020-09-20 RX ADMIN — HEPARIN SODIUM 5000 UNIT(S): 5000 INJECTION INTRAVENOUS; SUBCUTANEOUS at 13:32

## 2020-09-20 RX ADMIN — Medication 10 MILLIGRAM(S): at 13:33

## 2020-09-20 RX ADMIN — Medication 15 MILLIGRAM(S): at 16:25

## 2020-09-20 RX ADMIN — Medication 15 MILLIGRAM(S): at 16:09

## 2020-09-20 RX ADMIN — POLYETHYLENE GLYCOL 3350 17 GRAM(S): 17 POWDER, FOR SOLUTION ORAL at 13:30

## 2020-09-20 RX ADMIN — LISINOPRIL 20 MILLIGRAM(S): 2.5 TABLET ORAL at 05:13

## 2020-09-20 RX ADMIN — Medication 10 MILLIGRAM(S): at 05:13

## 2020-09-20 RX ADMIN — CHLORHEXIDINE GLUCONATE 1 APPLICATION(S): 213 SOLUTION TOPICAL at 05:18

## 2020-09-20 RX ADMIN — HEPARIN SODIUM 5000 UNIT(S): 5000 INJECTION INTRAVENOUS; SUBCUTANEOUS at 05:13

## 2020-09-20 NOTE — DISCHARGE NOTE PROVIDER - CARE PROVIDER_API CALL
Yg Kelsey (MD)  Surgery  Critical Care  [address] [facility], [address] [place] [address]  Phone: [phone]  Fax: [phone]  Follow Up Time:

## 2020-09-20 NOTE — PROGRESS NOTE ADULT - PROBLEM SELECTOR PLAN 1
- no evidece of malignacy on CT scan, though may be too small to see on CT scan, ,ay need PET/CT as outpatient to look for intrabdomnal disease  - await bowel movements  - advance diet as tolerated

## 2020-09-20 NOTE — PROGRESS NOTE ADULT - ASSESSMENT
Impression:  59 y/o female with PMHx breast CA diagnosed in 2017 (s/p bilateral mastectomy and breast reconstruction), abdominal carcinomatosis (s/p chemo/radiation 12/19 - 4/20 and hysterectomy 10/2019), HTN, GERD, sleep apnea admitted with recurrent SBO, Malignant SBO protocol. S/P Diagnostic laparoscopy, ex lap, SHARIFA POD#10. +flatus.    Plan   -Soft diet as tolerated, D/C PPN per GI  -Monitor for GI function.   -Miralax daily  -Local wound care  -Pain management PRN  -C/w Reglan/Octreotide.   -DVT prophylaxis, encouraged OOB/ambulating with PT.   -Continue medical management and supportive care  -Appreciate heme/onc follow up,  -D/C planning once patient has a bowel movement  - F/U AXR official read  -Will discuss with surgical attending

## 2020-09-20 NOTE — DISCHARGE NOTE PROVIDER - NSDCMRMEDTOKEN_GEN_ALL_CORE_FT
Bystolic 5 mg oral tablet: 1 tab(s) orally once a day  docusate sodium 100 mg oral capsule: 1 cap(s) orally once a day  Klor-Con 10 oral tablet, extended release: 1 tab(s) orally once a day  lisinopril-hydrochlorothiazide 10 mg-12.5 mg oral tablet: 1 tab(s) orally once a day  MiraLax oral powder for reconstitution: 1 packet(s) orally once a day MDD:1 packet  omeprazole 40 mg oral delayed release capsule: 1 cap(s) orally once a day  Vitamin D2 2000 intl units oral capsule: 1 cap(s) orally once a day   Bystolic 5 mg oral tablet: 1 tab(s) orally once a day  docusate sodium 100 mg oral capsule: 1 cap(s) orally once a day  Klor-Con 10 oral tablet, extended release: 1 tab(s) orally once a day  lisinopril-hydrochlorothiazide 10 mg-12.5 mg oral tablet: 1 tab(s) orally once a day  omeprazole 40 mg oral delayed release capsule: 1 cap(s) orally once a day  Vitamin D2 2000 intl units oral capsule: 1 cap(s) orally once a day

## 2020-09-20 NOTE — DISCHARGE NOTE PROVIDER - NSDCFUADDINST_GEN_ALL_CORE_FT
Please return to the emergency room if you experience recurrence/worsening of symptoms, or onset of new symptoms such as fever, chills, chest pain, shortness of breath, intractable pain, inability to have a bowel movement.

## 2020-09-20 NOTE — PROGRESS NOTE ADULT - ATTENDING COMMENTS
Feeling well. Good appetite, no nausea/vomiting. No BM yet, passing flatus. Will add dulcolax suppository to regimen today.

## 2020-09-20 NOTE — PROGRESS NOTE ADULT - SUBJECTIVE AND OBJECTIVE BOX
POST OPERATIVE DAY #: 10    SUBJECTIVE: Pt seen and examined at bedside. No overnight events.  Pt tolerating regular diet. States she doesn't have much appetite.   Pain controlled.   +Flatus/ No BM  +Ambulation  Pt denies n/v, sob, cp, or palpitations    Vital Signs Last 24 Hrs  T(C): 36.8 (20 Sep 2020 04:58), Max: 37.3 (19 Sep 2020 23:41)  T(F): 98.2 (20 Sep 2020 04:58), Max: 99.2 (19 Sep 2020 23:41)  HR: 78 (20 Sep 2020 04:58) (73 - 91)  BP: 122/67 (20 Sep 2020 04:58) (122/67 - 167/80)  BP(mean): --  RR: 18 (20 Sep 2020 04:58) (16 - 18)  SpO2: 97% (20 Sep 2020 04:58) (97% - 98%)  I&O's Summary    18 Sep 2020 07:01  -  19 Sep 2020 07:00  --------------------------------------------------------  IN: 380 mL / OUT: 0 mL / NET: 380 mL      I&O's Detail    18 Sep 2020 07:01  -  19 Sep 2020 07:00  --------------------------------------------------------  IN:    Oral Fluid: 380 mL  Total IN: 380 mL    OUT:  Total OUT: 0 mL    Total NET: 380 mL          Labs:                         9.2    4.02  )-----------( 187      ( 19 Sep 2020 08:26 )             27.8     09-19    142  |  107  |  6<L>  ----------------------------<  116<H>  3.4<L>   |  29  |  0.74    Ca    8.8      19 Sep 2020 08:26  Phos  3.4     09-19  Mg     2.0     09-19            Physical Exam:  GEN: NAD, A&Ox3  CHEST/LUNG: Clear to ausculation, bilaterally   HEART: S1S2  ABDOMEN: Midline wound c/d with steri-strips intact. +Bowel sounds, soft, Nontender, Nondistended, no rebound or guarding  EXTREMITIES:  calf soft, non tender b/l. 2+ distal pulses b/l.       < from: CT Abdomen and Pelvis w/ Oral Cont and w/ IV Cont (09.19.20 @ 15:57) >  No evidence of metastatic disease to the chest.    Small bowel obstruction with proximal dilated loops and distal collapsed loops. The exact point of transition is not identified, however likely secondary to adhesions versus implants. Oral contrast has not yet reached the colon.    Stable small ascites.    Single droplet of free air in the left upper quadrant without identifiable source. Correlate for recent abdominal procedure.    < end of copied text >

## 2020-09-20 NOTE — PROGRESS NOTE ADULT - SUBJECTIVE AND OBJECTIVE BOX
Patient: ERLIN SCHMIDT [id number] 60y Female                           Internal Medicine Hospitalist Progress Note    Initial HPI:  No complaints.  No pain.  + Flatus.  Tolerating po intake - solids.     ____________________PHYSICAL EXAM:  GENERAL:  NAD Alert and Oriented x 3   HEENT: NCAT  CARDIOVASCULAR:  S1, S2  LUNGS: CTAB  ABDOMEN:  soft, (-) tenderness, (-) distension, (+) bowel sounds, (-) guarding, (-) rebound (-) rigidity.  Healing midline incision.  no erythema / drainage.   EXTREMITIES:  no cyanosis / clubbing / edema.   ____________________    VITALS:  Vital Signs Last 24 Hrs  T(C): 37.2 (20 Sep 2020 11:27), Max: 37.3 (19 Sep 2020 23:41)  T(F): 99 (20 Sep 2020 11:27), Max: 99.2 (19 Sep 2020 23:41)  HR: 96 (20 Sep 2020 11:27) (73 - 96)  BP: 153/85 (20 Sep 2020 11:27) (122/67 - 167/80)  BP(mean): --  RR: 18 (20 Sep 2020 11:27) (16 - 18)  SpO2: 99% (20 Sep 2020 11:27) (97% - 99%) Daily     Daily Weight in k.4 (20 Sep 2020 04:58)  CAPILLARY BLOOD GLUCOSE        I&O's Summary    19 Sep 2020 07:01  -  20 Sep 2020 07:00  --------------------------------------------------------  IN: 935 mL / OUT: 0 mL / NET: 935 mL        LABS:                        8.9    4.37  )-----------( 205      ( 20 Sep 2020 07:52 )             26.9     09-20    140  |  104  |  4<L>  ----------------------------<  113<H>  3.5   |  31  |  0.62    Ca    8.7      20 Sep 2020 07:52  Phos  3.2     09-20  Mg     2.1     09-20                    MEDICATIONS:  acetaminophen   Tablet .. 650 milliGRAM(s) Oral every 6 hours PRN  bisacodyl Suppository 10 milliGRAM(s) Rectal daily PRN  chlorhexidine 4% Liquid 1 Application(s) Topical <User Schedule>  dextrose 10%. 1000 milliLiter(s) IV Continuous <Continuous>  dextrose 5% + sodium chloride 0.45%. 1000 milliLiter(s) IV Continuous <Continuous>  heparin   Injectable 5000 Unit(s) SubCutaneous every 8 hours  ketorolac   Injectable 15 milliGRAM(s) IV Push every 6 hours PRN  lisinopril 20 milliGRAM(s) Oral daily  metoclopramide Injectable 10 milliGRAM(s) IV Push every 8 hours  octreotide  Infusion 25 MICROgram(s)/Hr IV Continuous <Continuous>  polyethylene glycol 3350 17 Gram(s) Oral daily

## 2020-09-20 NOTE — DISCHARGE NOTE PROVIDER - NSDCCPTREATMENT_GEN_ALL_CORE_FT
PRINCIPAL PROCEDURE  Procedure: Exploratory laparotomy  Findings and Treatment:       SECONDARY PROCEDURE  Procedure: Diagnostic laparoscopy  Findings and Treatment:     Procedure: Lysis of intestinal adhesions  Findings and Treatment:

## 2020-09-20 NOTE — DISCHARGE NOTE PROVIDER - HOSPITAL COURSE
59 y/o female with PMHx breast CA diagnosed in 2017 (s/p bilateral mastectomy and breast reconstruction), abdominal carcinomatosis (s/p chemo/radiation 12/19 - 4/20 and hysterectomy 10/2019), HTN, GERD, sleep apnea admitted with recurrent SBO, Malignant SBO protocol. S/P Diagnostic laparoscopy, ex lap, SHARIFA 9/10/2020. Pt tolerated operation well. Pt had return of bowel function and tolerated advancement of diet. CTAP showed no evidence of metastasis to the lungs.  At the time of discharge on POD#11, the patient was hemodynamically stable, was tolerating PO diet, was voiding urine, was ambulating, and was comfortable with adequate pain control. Patient instructed to follow up and to call the office to make an appointment.

## 2020-09-20 NOTE — PROGRESS NOTE ADULT - ASSESSMENT
61 yo female with PMHx breast CA diagnosed in 2017 (s/p bilateral mastectomy and breast reconstruction), abdominal carcinomatosis (s/p chemo/radiation 12/19 - 4/20 and hysterectomy 10/2019), HTN, GERD, sleep apnea p/w recurrent SBO.  S/p Laparotomy and bowel resection.      # Mechanical SBO. S/p laparotomy and bowel resection. No pain, reports feeling better.  + Flatus.  Advance diet per surgery.  # h/o Uterine? / Breast CA - Per Onc recommendations, consider CT C /A/P to assess for carcinomatosis.  # Hypokalemia. Repleted  # hypophosphatemia. low. repleted.   # HTN - Continue Lisinopril.  If BP remains elevated, would consider escalating dose.   # GERD - c/w IV Protonix  # Macrocytosis. Unremarkable vitamin B12 and folate level.   # Moderate protein calorie malnutrition. nutritional consult recs appreciated.   # DVT prophylaxis - subcutaneous Heparin    Outpatient f/u with PMD, Oncology.

## 2020-09-20 NOTE — DISCHARGE NOTE PROVIDER - NSDCFUADDAPPT_GEN_ALL_CORE_FT
Please follow up with Dr. Kelsey in 1 weeks. You may call the office to schedule an appointment.     Please follow up with your primary care physician within one week of your discharge.    Please follow up with your oncologist within one week of your discharge.

## 2020-09-21 ENCOUNTER — TRANSCRIPTION ENCOUNTER (OUTPATIENT)
Age: 60
End: 2020-09-21

## 2020-09-21 VITALS
RESPIRATION RATE: 16 BRPM | DIASTOLIC BLOOD PRESSURE: 82 MMHG | TEMPERATURE: 99 F | OXYGEN SATURATION: 98 % | SYSTOLIC BLOOD PRESSURE: 151 MMHG | HEART RATE: 94 BPM

## 2020-09-21 LAB
ANION GAP SERPL CALC-SCNC: 7 MMOL/L — SIGNIFICANT CHANGE UP (ref 5–17)
BUN SERPL-MCNC: 5 MG/DL — LOW (ref 7–23)
CALCIUM SERPL-MCNC: 9 MG/DL — SIGNIFICANT CHANGE UP (ref 8.5–10.1)
CHLORIDE SERPL-SCNC: 102 MMOL/L — SIGNIFICANT CHANGE UP (ref 96–108)
CO2 SERPL-SCNC: 30 MMOL/L — SIGNIFICANT CHANGE UP (ref 22–31)
CREAT SERPL-MCNC: 0.65 MG/DL — SIGNIFICANT CHANGE UP (ref 0.5–1.3)
GLUCOSE SERPL-MCNC: 112 MG/DL — HIGH (ref 70–99)
HCT VFR BLD CALC: 27.2 % — LOW (ref 34.5–45)
HGB BLD-MCNC: 9 G/DL — LOW (ref 11.5–15.5)
MCHC RBC-ENTMCNC: 33.1 GM/DL — SIGNIFICANT CHANGE UP (ref 32–36)
MCHC RBC-ENTMCNC: 33.3 PG — SIGNIFICANT CHANGE UP (ref 27–34)
MCV RBC AUTO: 100.7 FL — HIGH (ref 80–100)
NRBC # BLD: 0 /100 WBCS — SIGNIFICANT CHANGE UP (ref 0–0)
PLATELET # BLD AUTO: 221 K/UL — SIGNIFICANT CHANGE UP (ref 150–400)
POTASSIUM SERPL-MCNC: 3.6 MMOL/L — SIGNIFICANT CHANGE UP (ref 3.5–5.3)
POTASSIUM SERPL-SCNC: 3.6 MMOL/L — SIGNIFICANT CHANGE UP (ref 3.5–5.3)
RBC # BLD: 2.7 M/UL — LOW (ref 3.8–5.2)
RBC # FLD: 15 % — HIGH (ref 10.3–14.5)
SODIUM SERPL-SCNC: 139 MMOL/L — SIGNIFICANT CHANGE UP (ref 135–145)
WBC # BLD: 5.11 K/UL — SIGNIFICANT CHANGE UP (ref 3.8–10.5)
WBC # FLD AUTO: 5.11 K/UL — SIGNIFICANT CHANGE UP (ref 3.8–10.5)

## 2020-09-21 PROCEDURE — 99232 SBSQ HOSP IP/OBS MODERATE 35: CPT

## 2020-09-21 RX ADMIN — Medication 650 MILLIGRAM(S): at 18:26

## 2020-09-21 RX ADMIN — OCTREOTIDE ACETATE 5 MICROGRAM(S)/HR: 200 INJECTION, SOLUTION INTRAVENOUS; SUBCUTANEOUS at 09:52

## 2020-09-21 RX ADMIN — LISINOPRIL 20 MILLIGRAM(S): 2.5 TABLET ORAL at 06:36

## 2020-09-21 RX ADMIN — SODIUM CHLORIDE 80 MILLILITER(S): 9 INJECTION, SOLUTION INTRAVENOUS at 06:37

## 2020-09-21 RX ADMIN — Medication 10 MILLIGRAM(S): at 05:38

## 2020-09-21 RX ADMIN — HEPARIN SODIUM 5000 UNIT(S): 5000 INJECTION INTRAVENOUS; SUBCUTANEOUS at 05:38

## 2020-09-21 RX ADMIN — HEPARIN SODIUM 5000 UNIT(S): 5000 INJECTION INTRAVENOUS; SUBCUTANEOUS at 13:19

## 2020-09-21 RX ADMIN — CHLORHEXIDINE GLUCONATE 1 APPLICATION(S): 213 SOLUTION TOPICAL at 05:38

## 2020-09-21 NOTE — PROGRESS NOTE ADULT - SUBJECTIVE AND OBJECTIVE BOX
Postoperative Day #11  Patient seen and examined at bedside, states that she had a "rough" day yesterday as Miralax caused severe abdominal cramping, however states she had BMx 3 yesterday, currently refusinf additional dose of miralax. Tolerating soft diet, no n/v. Afebrile.    T(F): 98.7 (09-21-20 @ 05:22), Max: 99.3 (09-20-20 @ 17:00)  HR: 90 (09-21-20 @ 05:22) (75 - 91)  BP: 124/80 (09-21-20 @ 05:22) (124/80 - 189/84)  RR: 18 (09-21-20 @ 05:22) (16 - 18)  SpO2: 97% (09-21-20 @ 05:22) (96% - 99%)  Wt(kg): --  CAPILLARY BLOOD GLUCOSE      Physical Exam:  GEN: NAD, A&Ox3  CHEST/LUNG: Clear to ausculation, bilaterally   HEART: S1S2  ABDOMEN: Midline wound c/d with steri-strips intact. +Bowel sounds, soft, Nontender, Nondistended, no rebound or guarding  EXTREMITIES:  calf soft, non tender b/l. 2+ distal pulses b/l.     LABS:                        9.0    5.11  )-----------( 221      ( 21 Sep 2020 07:13 )             27.2     09-21    139  |  102  |  5<L>  ----------------------------<  112<H>  3.6   |  30  |  0.65    Ca    9.0      21 Sep 2020 07:13  Phos  3.2     09-20  Mg     2.1     09-20        I&O's Detail    20 Sep 2020 07:01  -  21 Sep 2020 07:00  --------------------------------------------------------  IN:    dextrose 5% + sodium chloride 0.45%: 1680 mL    Octreotide: 105 mL    Oral Fluid: 720 mL  Total IN: 2505 mL    OUT:  Total OUT: 0 mL    Total NET: 2505 mL    < from: Xray Abdomen 2 Views (09.19.20 @ 22:35) >  Findings/  Impression:    There are bilateralbreast implants. Dilated loops of bowel are again seen. There is stool throughout the colon. Contrast does not definitively reach the colon.    < end of copied text >      Impression:  59 y/o female with PMHx breast CA diagnosed in 2017 (s/p bilateral mastectomy and breast reconstruction), abdominal carcinomatosis (s/p chemo/radiation 12/19 - 4/20 and hysterectomy 10/2019), HTN, GERD, sleep apnea admitted with recurrent SBO, Malignant SBO protocol. S/P Diagnostic laparoscopy, ex lap, SHARIFA POD#11, no with return of bowel function    Plan   -Soft diet as tolerated  -Monitor for GI function.   -Local wound care  -Pain management PRN  -C/w Reglan/Octreotide.   -DVT prophylaxis, encouraged OOB/ambulating with PT.   -Continue medical management and supportive care  -Appreciate heme/onc follow up  -D/C planning today as pt is having BM  -Will discuss with surgical attending

## 2020-09-21 NOTE — CHART NOTE - NSCHARTNOTESELECT_GEN_ALL_CORE
Malnutrition Notification
Malnutrition Notification
Event Note
Event Note/NGT removal
Nutrition Services
Nutrition Services
Surgery f/u/Event Note

## 2020-09-21 NOTE — CHART NOTE - NSCHARTNOTEFT_GEN_A_CORE
left IJ  triple lumen catheter removed , tip intact. Held pressure until hemostasis achieved.  Venotomy site  covered with xeroform and gauze then taped in place. Patient stable.

## 2020-09-21 NOTE — PROGRESS NOTE ADULT - ATTENDING COMMENTS
Tolerating diet, having BM's. Pain well controlled. TPN weaned/stopped.  AVSS  Abdomen soft, non tender, non distended  Labs wnl    Patient cleared for discharge home with follow with oncology, her PMD, and us in the office. Central line to be removed prior to discharge.

## 2020-09-21 NOTE — CHART NOTE - TREATMENT: THE FOLLOWING DIET HAS BEEN RECOMMENDED
Diet, Low Fiber:   Low Sodium  No Carb Prosource (1pkg = 15gms Protein)     Qty per Day:  1  Supplement Feeding Modality:  Oral  Ensure Clear Cans or Servings Per Day:  2       Frequency:  Daily (09-21-20 @ 13:15) [Pending Verification By Attending]  Diet, Soft:   Fiber/Residue Restricted (09-18-20 @ 12:18) [Active]

## 2020-09-21 NOTE — MEDICAL STUDENT PROGRESS NOTE(EDUCATION) - SUBJECTIVE AND OBJECTIVE BOX
GENERAL SURGERY PROGRESS NOTE    Patient: ERLIN SCHMIDT , 60y ([birth year])Female   MRN: [id number]  Location: [facility] [address]  Visit: 09-07-20 Inpatient  Date: 09-21-20 @ 06:18    Hospital Day #:  Post-Op Day #:    Procedure/Dx/Injuries:    Events of past 24 hours:    PAST MEDICAL & SURGICAL HISTORY:  Essential hypertension    Cancer of breast  Rt. Female    Sleep apnea  Use Oral Device    Obesity (BMI 30.0-34.9)    Seasonal allergies    Acid reflux    S/P hysterectomy    S/P breast reconstruction, bilateral    S/P bilateral mastectomy    Knee injury  Left &amp; had surgery about 30 years ago        Vitals: T(F): 98.7 (09-21-20 @ 05:22), Max: 99.3 (09-20-20 @ 17:00)  HR: 90 (09-21-20 @ 05:22)  BP: 124/80 (09-21-20 @ 05:22)  RR: 18 (09-21-20 @ 05:22)  SpO2: 97% (09-21-20 @ 05:22)      Pain (0-10):            Pain Control Adequate: [] YES [] N    Diet, Soft:   Fiber/Residue Restricted      09-19-20 @ 07:01  -  09-20-20 @ 07:00  --------------------------------------------------------  IN:    dextrose 5% + sodium chloride 0.45%: 880 mL    Octreotide: 55 mL  Total IN: 935 mL    OUT:  Total OUT: 0 mL    Total NET: 935 mL        Bowel Movement: : [] YES [] NO  Flatus: : [] YES [] NO    PHYSICAL EXAM  GEN:  CV:  LUNGS:  ABD:  EXT:  WOUND:  INCISION:    MEDICATIONS  (STANDING):  chlorhexidine 4% Liquid 1 Application(s) Topical <User Schedule>  dextrose 10%. 1000 milliLiter(s) (20 mL/Hr) IV Continuous <Continuous>  dextrose 5% + sodium chloride 0.45%. 1000 milliLiter(s) (80 mL/Hr) IV Continuous <Continuous>  heparin   Injectable 5000 Unit(s) SubCutaneous every 8 hours  lisinopril 20 milliGRAM(s) Oral daily  octreotide  Infusion 25 MICROgram(s)/Hr (5 mL/Hr) IV Continuous <Continuous>  polyethylene glycol 3350 17 Gram(s) Oral daily    MEDICATIONS  (PRN):  acetaminophen   Tablet .. 650 milliGRAM(s) Oral every 6 hours PRN Mild Pain (1 - 3)  bisacodyl Suppository 10 milliGRAM(s) Rectal daily PRN Constipation  ketorolac   Injectable 15 milliGRAM(s) IV Push every 6 hours PRN Moderate Pain (4 - 6)      DVT PROPHYLAXIS: [] YES [] NO   GI PROPHYLAXIS: [] YES [] NO   ANTICOAGULATION: [] YES [] NO   ANTIBIOTICS: [] YES [] NO       LAB/STUDIES:  CAPILLARY BLOOD GLUCOSE                              8.9    4.37  )-----------( 205      ( 20 Sep 2020 07:52 )             26.9     09-20    140  |  104  |  4<L>  ----------------------------<  113<H>  3.5   |  31  |  0.62    Ca    8.7      20 Sep 2020 07:52  Phos  3.2     09-20  Mg     2.1     09-20                    IMAGING:    Assessment:  60yFemale patient admitted with .    Plan:      Date/Time: 09-21-20 @ 06:18   GENERAL SURGERY PROGRESS NOTE    Patient: ERLIN SCHMIDT , 60y ([birth year])Female   MRN: [id number]  Location: [facility] 2C [address]  Visit: 09-07-20 Inpatient  Date: 09-21-20 @ 06:18    Post-Op Day #: 11    Events of past 24 hours: overnight, reports an episode of 10/10 cramping pain that went away after some morphine and falling asleep. Also reports last night that she had one solid BM and two liquid BMs before bed. This Am she is ambulating and urinating and passing flatus. Denies n/v/f/chills. Reports that she is tolerating her soft diet.    PAST MEDICAL & SURGICAL HISTORY:  Essential hypertension    Cancer of breast  Rt. Female    Sleep apnea  Use Oral Device    Obesity (BMI 30.0-34.9)    Seasonal allergies    Acid reflux    S/P hysterectomy    S/P breast reconstruction, bilateral    S/P bilateral mastectomy    Knee injury  Left &amp; had surgery about 30 years ago        Vitals: T(F): 98.7 (09-21-20 @ 05:22), Max: 99.3 (09-20-20 @ 17:00)  HR: 90 (09-21-20 @ 05:22)  BP: 124/80 (09-21-20 @ 05:22)  RR: 18 (09-21-20 @ 05:22)  SpO2: 97% (09-21-20 @ 05:22)      Pain (0-10):  2          Pain Control Adequate: [x] YES [] N    Diet, Soft:   Fiber/Residue Restricted      09-19-20 @ 07:01  -  09-20-20 @ 07:00  --------------------------------------------------------  IN:    dextrose 5% + sodium chloride 0.45%: 880 mL    Octreotide: 55 mL  Total IN: 935 mL    OUT:  Total OUT: 0 mL    Total NET: 935 mL        Bowel Movement: : [x] YES [] NO  Flatus: : [x] YES [] NO    PHYSICAL EXAM  GEN: resting comfortably in bed, NAD  CV: RRR no murmur  LUNGS: CTAB  ABD: moderately distended abdomen, incision is healing well with CDI steri strips and no signs of infection. rare bowel sounds are heard. tympanic to percussion, no TTP  EXT: FROM      MEDICATIONS  (STANDING):  chlorhexidine 4% Liquid 1 Application(s) Topical <User Schedule>  dextrose 10%. 1000 milliLiter(s) (20 mL/Hr) IV Continuous <Continuous>  dextrose 5% + sodium chloride 0.45%. 1000 milliLiter(s) (80 mL/Hr) IV Continuous <Continuous>  heparin   Injectable 5000 Unit(s) SubCutaneous every 8 hours  lisinopril 20 milliGRAM(s) Oral daily  octreotide  Infusion 25 MICROgram(s)/Hr (5 mL/Hr) IV Continuous <Continuous>  polyethylene glycol 3350 17 Gram(s) Oral daily    MEDICATIONS  (PRN):  acetaminophen   Tablet .. 650 milliGRAM(s) Oral every 6 hours PRN Mild Pain (1 - 3)  bisacodyl Suppository 10 milliGRAM(s) Rectal daily PRN Constipation  ketorolac   Injectable 15 milliGRAM(s) IV Push every 6 hours PRN Moderate Pain (4 - 6)      DVT PROPHYLAXIS: [x] YES [] NO   GI PROPHYLAXIS: [] YES [] NO   ANTICOAGULATION: [x] YES [] NO   ANTIBIOTICS: [] YES [x] NO       LAB/STUDIES:  CAPILLARY BLOOD GLUCOSE                            9.0    5.11  )-----------( 221      ( 21 Sep 2020 07:13 )             27.2   09-21    139  |  102  |  5<L>  ----------------------------<  112<H>  3.6   |  30  |  0.65    Ca    9.0      21 Sep 2020 07:13  Phos  3.2     09-20  Mg     2.1     09-20                            8.9    4.37  )-----------( 205      ( 20 Sep 2020 07:52 )             26.9     09-20    140  |  104  |  4<L>  ----------------------------<  113<H>  3.5   |  31  |  0.62    Ca    8.7      20 Sep 2020 07:52  Phos  3.2     09-20  Mg     2.1     09-20                    IMAGING:    Assessment:  60yFemale patient with HTN, GERD, metastatic breast CA with abd carcinomatosis admitted with recurrent SBO, Malignant SBO protocol. S/P Diagnostic laparoscopy, ex lap, SHARIFA POD#11 with flatus and BM. Advancing diet    Plan:  Soft diet as tolerated, D/C PPN per GI  -Monitor for GI function.   -Miralax daily  -Local wound care  -Pain management PRN  -C/w Reglan/Octreotide.   -DVT prophylaxis, encouraged OOB/ambulating with PT.   -Continue medical management and supportive care  -Appreciate heme/onc follow up,  -D/C planning        Date/Time: 09-21-20 @ 06:18   GENERAL SURGERY PROGRESS NOTE    Patient: ERLIN SCHMIDT , 60y ([birth year])Female   MRN: [id number]  Location: [facility] [address]  Visit: 09-07-20 Inpatient  Date: 09-21-20 @ 06:18    Post-Op Day #: 11    Events of past 24 hours: overnight, reports an episode of 10/10 cramping pain that went away after some morphine and falling asleep (she reports that she thinks this was from the miralax). Also reports last night that she had one solid BM and two liquid BMs before bed. This Am she is ambulating and urinating and passing flatus. Denies n/v/f/chills. Reports that she is tolerating her soft diet.    PAST MEDICAL & SURGICAL HISTORY:  Essential hypertension    Cancer of breast  Rt. Female    Sleep apnea  Use Oral Device    Obesity (BMI 30.0-34.9)    Seasonal allergies    Acid reflux    S/P hysterectomy    S/P breast reconstruction, bilateral    S/P bilateral mastectomy    Knee injury  Left &amp; had surgery about 30 years ago        Vitals: T(F): 98.7 (09-21-20 @ 05:22), Max: 99.3 (09-20-20 @ 17:00)  HR: 90 (09-21-20 @ 05:22)  BP: 124/80 (09-21-20 @ 05:22)  RR: 18 (09-21-20 @ 05:22)  SpO2: 97% (09-21-20 @ 05:22)      Pain (0-10):  2          Pain Control Adequate: [x] YES [] N    Diet, Soft:   Fiber/Residue Restricted      09-19-20 @ 07:01  -  09-20-20 @ 07:00  --------------------------------------------------------  IN:    dextrose 5% + sodium chloride 0.45%: 880 mL    Octreotide: 55 mL  Total IN: 935 mL    OUT:  Total OUT: 0 mL    Total NET: 935 mL        Bowel Movement: : [x] YES [] NO  Flatus: : [x] YES [] NO    PHYSICAL EXAM  GEN: resting comfortably in bed, NAD  CV: RRR no murmur  LUNGS: CTAB  ABD: moderately distended abdomen, incision is healing well with CDI steri strips and no signs of infection. rare bowel sounds are heard. tympanic to percussion, no TTP  EXT: FROM      MEDICATIONS  (STANDING):  chlorhexidine 4% Liquid 1 Application(s) Topical <User Schedule>  dextrose 10%. 1000 milliLiter(s) (20 mL/Hr) IV Continuous <Continuous>  dextrose 5% + sodium chloride 0.45%. 1000 milliLiter(s) (80 mL/Hr) IV Continuous <Continuous>  heparin   Injectable 5000 Unit(s) SubCutaneous every 8 hours  lisinopril 20 milliGRAM(s) Oral daily  octreotide  Infusion 25 MICROgram(s)/Hr (5 mL/Hr) IV Continuous <Continuous>  polyethylene glycol 3350 17 Gram(s) Oral daily    MEDICATIONS  (PRN):  acetaminophen   Tablet .. 650 milliGRAM(s) Oral every 6 hours PRN Mild Pain (1 - 3)  bisacodyl Suppository 10 milliGRAM(s) Rectal daily PRN Constipation  ketorolac   Injectable 15 milliGRAM(s) IV Push every 6 hours PRN Moderate Pain (4 - 6)      DVT PROPHYLAXIS: [x] YES [] NO   GI PROPHYLAXIS: [] YES [] NO   ANTICOAGULATION: [x] YES [] NO   ANTIBIOTICS: [] YES [x] NO       LAB/STUDIES:  CAPILLARY BLOOD GLUCOSE                            9.0    5.11  )-----------( 221      ( 21 Sep 2020 07:13 )             27.2   09-21    139  |  102  |  5<L>  ----------------------------<  112<H>  3.6   |  30  |  0.65    Ca    9.0      21 Sep 2020 07:13  Phos  3.2     09-20  Mg     2.1     09-20                            8.9    4.37  )-----------( 205      ( 20 Sep 2020 07:52 )             26.9     09-20    140  |  104  |  4<L>  ----------------------------<  113<H>  3.5   |  31  |  0.62    Ca    8.7      20 Sep 2020 07:52  Phos  3.2     09-20  Mg     2.1     09-20                    IMAGING:    Assessment:  60yFemale patient with HTN, GERD, metastatic breast CA with abd carcinomatosis admitted with recurrent SBO, Malignant SBO protocol. S/P Diagnostic laparoscopy, ex lap, SHARIFA POD#11 with flatus and BM. Advancing diet    Plan:  Soft diet as tolerated, D/C PPN per GI  -Monitor for GI function.   -Miralax daily  -Local wound care  -Pain management PRN  -C/w Reglan/Octreotide.   -DVT prophylaxis, encouraged OOB/ambulating with PT.   -Continue medical management and supportive care  -Appreciate heme/onc follow up,  -D/C planning        Date/Time: 09-21-20 @ 06:18

## 2020-09-21 NOTE — DISCHARGE NOTE NURSING/CASE MANAGEMENT/SOCIAL WORK - PATIENT PORTAL LINK FT
You can access the FollowMyHealth Patient Portal offered by [facility] by registering at the following website: [email or web address]. By joining [email or web address] FollowMyHealth portal, you will also be able to view your health information using other applications (apps) compatible with our system.

## 2020-09-21 NOTE — PROGRESS NOTE ADULT - REASON FOR ADMISSION
Abdominal pain

## 2020-09-21 NOTE — CHART NOTE - NSCHARTNOTEFT_GEN_A_CORE
Assessment:  Pt adm c SBO, s/p laparoscopy, ex lap SHARIFA.  PMH: breast cancer, abdominal carcinomatosis, HTN, GERD.  PPN ordered 09/16->09/17 & d/c'd 09/17.  Diet therapy for fiber restricted/low fiber diet explained/reviewed.   Pt followed Low sodium diet @ home.     Factors impacting intake: [ ] none [ ] nausea  [ ] vomiting [ ] diarrhea [ ] constipation  [ ]chewing problems [ ] swallowing issues  [x] other: surgery, s/p BMs yesterday     Diet Prescription: Diet, Soft:   Fiber/Residue Restricted (09-18-20 @ 12:18)    Intake: ~50% meals, <50% nutrition needs>5 days noted     Current Weight: 09/21, 83.2 kg, 09/07, 80 kg, c wt. gain of 3.2 kg  % Weight Change: 4%  No edema noted, ? wt. accuracy    Nutrition focused physical exam conducted; Subcutaneous fat Exam;  [ Moderate  ]  Orbital fat pads region,  [ Mild  ]Buccal fat region,  [ WNL  ]triceps region, [ WNL   ]ribs region.  Muscle Exam; [ Mild  ]temples region, [ Moderate  ]clavicle region, [ Moderate  ]shoulder region, [ WNL  ]Scapula region, [ Mild ]Interosseous region, [ WNL  ]thigh region, [ WNL   ]Calf region    Pertinent Medications: MEDICATIONS  (STANDING):  chlorhexidine 4% Liquid 1 Application(s) Topical <User Schedule>  dextrose 10%. 1000 milliLiter(s) (20 mL/Hr) IV Continuous <Continuous>  dextrose 5% + sodium chloride 0.45%. 1000 milliLiter(s) (80 mL/Hr) IV Continuous <Continuous>  heparin   Injectable 5000 Unit(s) SubCutaneous every 8 hours  lisinopril 20 milliGRAM(s) Oral daily  octreotide  Infusion 25 MICROgram(s)/Hr (5 mL/Hr) IV Continuous <Continuous>  polyethylene glycol 3350 17 Gram(s) Oral daily    MEDICATIONS  (PRN):  acetaminophen   Tablet .. 650 milliGRAM(s) Oral every 6 hours PRN Mild Pain (1 - 3)  bisacodyl Suppository 10 milliGRAM(s) Rectal daily PRN Constipation  ketorolac   Injectable 15 milliGRAM(s) IV Push every 6 hours PRN Moderate Pain (4 - 6)    Pertinent Labs: 09-21 Na139 mmol/L Glu 112 mg/dL<H> K+ 3.6 mmol/L Cr  0.65 mg/dL BUN 5 mg/dL<L> 09-20 Phos 3.2 mg/dL 09-17 Alb 2.5 g/dL<L>09-17 ALT 25 U/L AST 24 U/L Alkaline Phosphatase 101 U/L   CAPILLARY BLOOD GLUCOSE    Skin:   WDL except surgical incision    Estimated Needs:   [x ] no change since previous assessment(09/11)  [ ] recalculated:     Previous Nutrition Diagnosis:   Malnutrition moderate malnutrition in context of acute illness.   Etiology inadequate protein-energy intake in setting of SBO.   Signs/Symptoms <75% nutrition needs >7 days, physical findings of mild muscle loss.   Goal/Expected Outcome diet progression c >75% of consumption of meals & supplement; not met     Nutrition Diagnosis is [x ] ongoing( see new diagnosis below)  [ ] resolved [ ] not applicable     New Nutrition Diagnosis:   [x ] Malnutrition; severe malnutrition in context of acute illness     Related to: inadequate protein-energy intake in setting of c SBO, s/p laparoscopy, ex lap SHARIFA     As evidenced by: <50% nutrition needs>5 days, physical findings of moderate muscle loss     Goal: pt to consume >75% of meals & supplement     Interventions:   Recommend  [x ] Change Diet To: low fiber, Low sodium   [x ] Nutrition Supplement; Recommend Ensure Clear 8 oz 2x/day (480 nara, 16 gm pro), No Carb Prosource 1 pkg daily=60 calories, 15 grams protein per pkg   [ ] Nutrition Support  [ ] Other:     Monitoring and Evaluation:   [x ] PO intake [ x ] Tolerance to diet prescription [ x ] weights [ x ] labs[ x ] follow up per protocol  [ ] other:

## 2020-09-21 NOTE — PROGRESS NOTE ADULT - SUBJECTIVE AND OBJECTIVE BOX
lying in bed    Vital Signs Last 24 Hrs  T(C): 37.1 (21 Sep 2020 05:22), Max: 37.4 (20 Sep 2020 17:00)  T(F): 98.7 (21 Sep 2020 05:22), Max: 99.3 (20 Sep 2020 17:00)  HR: 90 (21 Sep 2020 05:22) (75 - 96)  BP: 124/80 (21 Sep 2020 05:22) (124/80 - 189/84)  BP(mean): --  RR: 18 (21 Sep 2020 05:22) (16 - 18)  SpO2: 97% (21 Sep 2020 05:22) (96% - 99%)    PHYSICAL EXAM:    general - AAO x 3  HEENT - No Icterus  CVS - RRR  RS - AE B/L  Abd - soft, NT  Ext - Pulses +        LABS:                        9.0    5.11  )-----------( 221      ( 21 Sep 2020 07:13 )             27.2     09-21    139  |  102  |  5<L>  ----------------------------<  112<H>  3.6   |  30  |  0.65    Ca    9.0      21 Sep 2020 07:13  Phos  3.2     09-20  Mg     2.1     09-20              RADIOLOGY & ADDITIONAL STUDIES:

## 2020-09-21 NOTE — PROGRESS NOTE ADULT - ASSESSMENT
61 yo female with PMHx breast CA diagnosed in 2017 (s/p bilateral mastectomy and breast reconstruction), abdominal carcinomatosis (s/p chemo/radiation 12/19 - 4/20 and hysterectomy 10/2019), HTN, GERD, sleep apnea p/w recurrent SBO.  S/p Laparotomy and bowel resection.      # Mechanical SBO. S/p laparotomy and bowel resection. No pain, reports feeling better.  + Flatus.  Advance diet per surgery.  # h/o Uterine? / Breast CA - Per Onc recommendations, consider CT C /A/P to assess for carcinomatosis.  # Hypokalemia. Repleted  # hypophosphatemia. low. repleted.   # HTN - Continue Lisinopril.  If BP remains elevated, would consider escalating dose.   # GERD - c/w IV Protonix  # Macrocytosis. Unremarkable vitamin B12 and folate level.   # Moderate protein calorie malnutrition. nutritional consult recs appreciated.   # DVT prophylaxis - subcutaneous Heparin    Outpatient f/u with PMD, Oncology. 61 yo female with PMHx breast CA diagnosed in 2017 (s/p bilateral mastectomy and breast reconstruction), abdominal carcinomatosis (s/p chemo/radiation 12/19 - 4/20 and hysterectomy 10/2019), HTN, GERD, sleep apnea p/w recurrent SBO.  S/p Laparotomy and bowel resection.      # Mechanical SBO. S/p laparotomy and bowel resection. No pain, reports feeling better.  + Flatus.  +BMs tolerating diet   # h/o Uterine? / Breast CA - CT C /A/P no e/o mets. patient to follow-up outpt for PET/CT  # Hypokalemia. Repleted  # hypophosphatemia. low. repleted.   # HTN - Continue Lisinopril.  # GERD - c/w IV Protonix  # Macrocytosis. Unremarkable vitamin B12 and folate level.   # Moderate protein calorie malnutrition. nutritional consult recs appreciated.   # DVT prophylaxis - subcutaneous Heparin    Outpatient f/u with PMD, Oncology.

## 2020-09-21 NOTE — PROGRESS NOTE ADULT - PROVIDER SPECIALTY LIST ADULT
Gastroenterology
Heme/Onc
Hospitalist
Surgery
Hospitalist

## 2020-09-21 NOTE — PROGRESS NOTE ADULT - SUBJECTIVE AND OBJECTIVE BOX
Patient: ERLIN SCHMIDT [id number] 60y Female                           Internal Medicine Hospitalist Progress Note    Initial HPI:  No complaints.  No pain.  + Flatus.  Tolerating po intake - solids.     VITALS:  Vital Signs Last 24 Hrs  T(C): 37.3 (21 Sep 2020 16:14), Max: 37.3 (21 Sep 2020 16:14)  T(F): 99.1 (21 Sep 2020 16:14), Max: 99.1 (21 Sep 2020 16:14)  HR: 94 (21 Sep 2020 16:14) (83 - 94)  BP: 151/82 (21 Sep 2020 16:14) (124/80 - 156/80)  BP(mean): --  RR: 16 (21 Sep 2020 16:14) (16 - 18)  SpO2: 98% (21 Sep 2020 16:14) (96% - 99%)        GENERAL: NAD well-developed  HEAD:  Atraumatic, Normocephalic  EYES: EOMI, PERRLA, conjunctiva and sclera clear  ENMT: No tonsillar erythema, exudates, or enlargement; Moist mucous membranes, Good dentition, No lesions  NECK: Supple, No JVD, Normal thyroid  NERVOUS SYSTEM:  Alert & Oriented X3, Good concentration; Motor Strength 5/5 B/L upper and lower extremities; DTRs 2+ intact and symmetric  CHEST/LUNG: Clear to percussion bilaterally; No rales, rhonchi, wheezing, or rubs  HEART: Regular rate and rhythm; No murmurs, rubs, or gallops  ABDOMEN: Soft, Nontender, Nondistended; Bowel sounds present  EXTREMITIES:  2+ Peripheral Pulses, No clubbing, cyanosis, or edema  LYMPH: No lymphadenopathy   SKIN: No rashes or lesions        LABS:                                   9.0    5.11  )-----------( 221      ( 21 Sep 2020 07:13 )             27.2   09-21    139  |  102  |  5<L>  ----------------------------<  112<H>  3.6   |  30  |  0.65    Ca    9.0      21 Sep 2020 07:13  Phos  3.2     09-20  Mg     2.1     09-20                MEDICATIONS:  acetaminophen   Tablet .. 650 milliGRAM(s) Oral every 6 hours PRN  bisacodyl Suppository 10 milliGRAM(s) Rectal daily PRN  chlorhexidine 4% Liquid 1 Application(s) Topical <User Schedule>  dextrose 10%. 1000 milliLiter(s) IV Continuous <Continuous>  dextrose 5% + sodium chloride 0.45%. 1000 milliLiter(s) IV Continuous <Continuous>  heparin   Injectable 5000 Unit(s) SubCutaneous every 8 hours  ketorolac   Injectable 15 milliGRAM(s) IV Push every 6 hours PRN  lisinopril 20 milliGRAM(s) Oral daily  metoclopramide Injectable 10 milliGRAM(s) IV Push every 8 hours  octreotide  Infusion 25 MICROgram(s)/Hr IV Continuous <Continuous>  polyethylene glycol 3350 17 Gram(s) Oral daily      Care discussed in detail with patient.  All questions and concerns addressed   Patient: ERLIN SCHMIDT [id number] 60y Female                           Internal Medicine Hospitalist Progress Note    Initial HPI:  No complaints.  No pain.  + Flatus.  Tolerating po intake - solids.     VITALS:  Vital Signs Last 24 Hrs  T(C): 37.3 (21 Sep 2020 16:14), Max: 37.3 (21 Sep 2020 16:14)  T(F): 99.1 (21 Sep 2020 16:14), Max: 99.1 (21 Sep 2020 16:14)  HR: 94 (21 Sep 2020 16:14) (83 - 94)  BP: 151/82 (21 Sep 2020 16:14) (124/80 - 156/80)  BP(mean): --  RR: 16 (21 Sep 2020 16:14) (16 - 18)  SpO2: 98% (21 Sep 2020 16:14) (96% - 99%)        GENERAL: NAD well-developed  HEAD:  Atraumatic, Normocephalic  EYES: EOMI, PERRLA, conjunctiva and sclera clear  ENMT: No tonsillar erythema, exudates, or enlargement; Moist mucous membranes  NECK: Supple, No JVD  NERVOUS SYSTEM:  Alert & Oriented X3, Good concentration; nonfocal  CHEST/LUNG: CTAB  HEART: Regular rate and rhythm; No murmurs, rubs, or gallops  ABDOMEN: Soft, mildly tender, Nondistended; Bowel sounds present, surgical incision heeling well , no discharge   EXTREMITIES:  2+ Peripheral Pulses b/l, No clubbing, cyanosis, or edema b/l           LABS:                                   9.0    5.11  )-----------( 221      ( 21 Sep 2020 07:13 )             27.2   09-21    139  |  102  |  5<L>  ----------------------------<  112<H>  3.6   |  30  |  0.65    Ca    9.0      21 Sep 2020 07:13  Phos  3.2     09-20  Mg     2.1     09-20                MEDICATIONS:  acetaminophen   Tablet .. 650 milliGRAM(s) Oral every 6 hours PRN  bisacodyl Suppository 10 milliGRAM(s) Rectal daily PRN  chlorhexidine 4% Liquid 1 Application(s) Topical <User Schedule>  dextrose 10%. 1000 milliLiter(s) IV Continuous <Continuous>  dextrose 5% + sodium chloride 0.45%. 1000 milliLiter(s) IV Continuous <Continuous>  heparin   Injectable 5000 Unit(s) SubCutaneous every 8 hours  ketorolac   Injectable 15 milliGRAM(s) IV Push every 6 hours PRN  lisinopril 20 milliGRAM(s) Oral daily  metoclopramide Injectable 10 milliGRAM(s) IV Push every 8 hours  octreotide  Infusion 25 MICROgram(s)/Hr IV Continuous <Continuous>  polyethylene glycol 3350 17 Gram(s) Oral daily      Care discussed in detail with patient.  All questions and concerns addressed

## 2020-09-21 NOTE — PROGRESS NOTE ADULT - NUTRITIONAL ASSESSMENT
This patient has been assessed with a concern for Malnutrition and has been determined to have a diagnosis/diagnoses of Moderate protein-calorie malnutrition.    This patient is being managed with:   Diet Full Liquid-  No Dairy  Entered: Sep 17 2020 10:24AM    
This patient has been assessed with a concern for Malnutrition and has been determined to have a diagnosis/diagnoses of Moderate protein-calorie malnutrition.    This patient is being managed with:   Diet Clear Liquid-  Entered: Sep 16 2020 11:25AM    
This patient has been assessed with a concern for Malnutrition and has been determined to have a diagnosis/diagnoses of Moderate protein-calorie malnutrition.    This patient is being managed with:   Diet NPO-  Except Medications  Entered: Sep 10 2020 11:12PM    
This patient has been assessed with a concern for Malnutrition and has been determined to have a diagnosis/diagnoses of Moderate protein-calorie malnutrition.    This patient is being managed with:   Diet Pureed-  Entered: Sep 17 2020  7:12PM    
This patient has been assessed with a concern for Malnutrition and has been determined to have a diagnosis/diagnoses of Moderate protein-calorie malnutrition.    This patient is being managed with:   Diet Soft-  Fiber/Residue Restricted  Entered: Sep 18 2020 12:17PM    
This patient has been assessed with a concern for Malnutrition and has been determined to have a diagnosis/diagnoses of Moderate protein-calorie malnutrition.    This patient is being managed with:   Diet Soft-  Fiber/Residue Restricted  Entered: Sep 18 2020 12:17PM    
This patient has been assessed with a concern for Malnutrition and has been determined to have a diagnosis/diagnoses of Moderate protein-calorie malnutrition.    This patient is being managed with:   Diet Clear Liquid-  Entered: Sep 16 2020 11:25AM    
This patient has been assessed with a concern for Malnutrition and has been determined to have a diagnosis/diagnoses of Moderate protein-calorie malnutrition.    This patient is being managed with:   Diet NPO-  Except Medications  Entered: Sep 10 2020 11:12PM    
This patient has been assessed with a concern for Malnutrition and has been determined to have a diagnosis/diagnoses of Moderate protein-calorie malnutrition.    This patient is being managed with:   Diet Soft-  Fiber/Residue Restricted  Entered: Sep 18 2020 12:17PM    
This patient has been assessed with a concern for Malnutrition and has been determined to have a diagnosis/diagnoses of Severe protein-calorie malnutrition and Moderate protein-calorie malnutrition.    This patient is being managed with:   Diet Soft-  Fiber/Residue Restricted  Entered: Sep 18 2020 12:17PM    The following pending diet order is being considered for treatment of Severe protein-calorie malnutrition and Moderate protein-calorie malnutrition:  Diet Low Fiber-  Low Sodium  No Carb Prosource (1pkg = 15gms Protein)     Qty per Day:  1  Supplement Feeding Modality:  Oral  Ensure Clear Cans or Servings Per Day:  2       Frequency:  Daily  Entered: Sep 21 2020  1:14PM  
This patient has been assessed with a concern for Malnutrition and has been determined to have a diagnosis/diagnoses of Moderate protein-calorie malnutrition.    This patient is being managed with:   Diet Clear Liquid-  Entered: Sep 16 2020 11:25AM    
This patient has been assessed with a concern for Malnutrition and has been determined to have a diagnosis/diagnoses of Moderate protein-calorie malnutrition.    This patient is being managed with:   Diet Full Liquid-  No Dairy  Entered: Sep 17 2020 10:24AM    
This patient has been assessed with a concern for Malnutrition and has been determined to have a diagnosis/diagnoses of Moderate protein-calorie malnutrition.    This patient is being managed with:   Diet Pureed-  Entered: Sep 17 2020  7:12PM    
This patient has been assessed with a concern for Malnutrition and has been determined to have a diagnosis/diagnoses of Moderate protein-calorie malnutrition.    This patient is being managed with:   Diet Clear Liquid-  Entered: Sep 16 2020 11:25AM    
This patient has been assessed with a concern for Malnutrition and has been determined to have a diagnosis/diagnoses of Moderate protein-calorie malnutrition.    This patient is being managed with:   Diet NPO-  Except Medications  Entered: Sep 10 2020 11:12PM
This patient has been assessed with a concern for Malnutrition and has been determined to have a diagnosis/diagnoses of Moderate protein-calorie malnutrition.    This patient is being managed with:   Diet Pureed-  Entered: Sep 17 2020  7:12PM    
This patient has been assessed with a concern for Malnutrition and has been determined to have a diagnosis/diagnoses of Moderate protein-calorie malnutrition.    This patient is being managed with:   Diet Soft-  Fiber/Residue Restricted  Entered: Sep 18 2020 12:17PM    

## 2020-10-01 ENCOUNTER — APPOINTMENT (OUTPATIENT)
Dept: SURGERY | Facility: CLINIC | Age: 60
End: 2020-10-01
Payer: COMMERCIAL

## 2020-10-01 VITALS
WEIGHT: 172 LBS | DIASTOLIC BLOOD PRESSURE: 80 MMHG | BODY MASS INDEX: 27 KG/M2 | HEART RATE: 106 BPM | TEMPERATURE: 97.1 F | OXYGEN SATURATION: 91 % | HEIGHT: 67 IN | SYSTOLIC BLOOD PRESSURE: 160 MMHG

## 2020-10-01 PROBLEM — Z00.00 ENCOUNTER FOR PREVENTIVE HEALTH EXAMINATION: Status: ACTIVE | Noted: 2020-10-01

## 2020-10-01 PROCEDURE — 99024 POSTOP FOLLOW-UP VISIT: CPT

## 2020-10-01 NOTE — PLAN
[FreeTextEntry1] : Ms. Mg is status post exploratory laparotomy, enterolysis for malignant SBO. She initially was started on malignant small bowel protocol (Decadron, dexamethasone, and, Reglan) unfortunately she continued to not tolerate diet or resumption of GI function in during the hospital admission. Subsequently she underwent a exploratory laparotomy and enterolysis. She has recovered well. Per her Oncologist, a PET CT is planned for next week. Will follow-up. May return to office as needed.

## 2020-10-01 NOTE — PHYSICAL EXAM
[Normal Breath Sounds] : Normal breath sounds [Normal Rate and Rhythm] : normal rate and rhythm [Abdominal Masses] : No abdominal masses [Abdomen Tenderness] : ~T ~M Abdominal tenderness [Alert] : alert [Oriented to Person] : disoriented to person [Anxious] : anxious [de-identified] : NAD [de-identified] : Soft, non-distended, incision well healed. RUQ tenderness.

## 2020-10-01 NOTE — HISTORY OF PRESENT ILLNESS
[de-identified] : Ms. Mg is status post exploratory laparotomy, enterolysis for malignant SBO.  [de-identified] : Ms. Mg is status post exploratory laparotomy, enterolysis for malignant SBO. Overall feeling well. She continues to have normal bowel movements and tolerating diet. She has mild right subcostal pain since her discharge. She has no shortness of breath or chest pain. She has continued her daily activities.

## 2020-11-30 ENCOUNTER — APPOINTMENT (OUTPATIENT)
Dept: CT IMAGING | Facility: HOSPITAL | Age: 60
End: 2020-11-30

## 2020-11-30 ENCOUNTER — OUTPATIENT (OUTPATIENT)
Dept: OUTPATIENT SERVICES | Facility: HOSPITAL | Age: 60
LOS: 1 days | Discharge: HOME | End: 2020-11-30
Payer: COMMERCIAL

## 2020-11-30 DIAGNOSIS — Z90.13 ACQUIRED ABSENCE OF BILATERAL BREASTS AND NIPPLES: Chronic | ICD-10-CM

## 2020-11-30 DIAGNOSIS — C54.1 MALIGNANT NEOPLASM OF ENDOMETRIUM: ICD-10-CM

## 2020-11-30 DIAGNOSIS — Z90.710 ACQUIRED ABSENCE OF BOTH CERVIX AND UTERUS: Chronic | ICD-10-CM

## 2020-11-30 DIAGNOSIS — S89.90XA UNSPECIFIED INJURY OF UNSPECIFIED LOWER LEG, INITIAL ENCOUNTER: Chronic | ICD-10-CM

## 2020-11-30 DIAGNOSIS — Z98.890 OTHER SPECIFIED POSTPROCEDURAL STATES: Chronic | ICD-10-CM

## 2020-11-30 DIAGNOSIS — C78.6 SECONDARY MALIGNANT NEOPLASM OF RETROPERITONEUM AND PERITONEUM: ICD-10-CM

## 2020-11-30 PROCEDURE — 71260 CT THORAX DX C+: CPT | Mod: 26

## 2020-11-30 PROCEDURE — 74177 CT ABD & PELVIS W/CONTRAST: CPT | Mod: 26

## 2020-12-06 ENCOUNTER — INPATIENT (INPATIENT)
Facility: HOSPITAL | Age: 60
LOS: 9 days | Discharge: HOME | End: 2020-12-16
Attending: INTERNAL MEDICINE | Admitting: INTERNAL MEDICINE
Payer: COMMERCIAL

## 2020-12-06 VITALS
SYSTOLIC BLOOD PRESSURE: 157 MMHG | HEIGHT: 67 IN | HEART RATE: 104 BPM | OXYGEN SATURATION: 96 % | DIASTOLIC BLOOD PRESSURE: 102 MMHG | WEIGHT: 165.35 LBS | TEMPERATURE: 98 F | RESPIRATION RATE: 18 BRPM

## 2020-12-06 DIAGNOSIS — S89.90XA UNSPECIFIED INJURY OF UNSPECIFIED LOWER LEG, INITIAL ENCOUNTER: Chronic | ICD-10-CM

## 2020-12-06 DIAGNOSIS — Z90.13 ACQUIRED ABSENCE OF BILATERAL BREASTS AND NIPPLES: Chronic | ICD-10-CM

## 2020-12-06 DIAGNOSIS — I10 ESSENTIAL (PRIMARY) HYPERTENSION: ICD-10-CM

## 2020-12-06 DIAGNOSIS — K21.9 GASTRO-ESOPHAGEAL REFLUX DISEASE WITHOUT ESOPHAGITIS: ICD-10-CM

## 2020-12-06 DIAGNOSIS — R18.8 OTHER ASCITES: ICD-10-CM

## 2020-12-06 DIAGNOSIS — C79.9 SECONDARY MALIGNANT NEOPLASM OF UNSPECIFIED SITE: ICD-10-CM

## 2020-12-06 DIAGNOSIS — Z90.710 ACQUIRED ABSENCE OF BOTH CERVIX AND UTERUS: Chronic | ICD-10-CM

## 2020-12-06 DIAGNOSIS — Z98.890 OTHER SPECIFIED POSTPROCEDURAL STATES: Chronic | ICD-10-CM

## 2020-12-06 LAB
ALBUMIN SERPL ELPH-MCNC: 2.8 G/DL — LOW (ref 3.3–5)
ALP SERPL-CCNC: 89 U/L — SIGNIFICANT CHANGE UP (ref 40–120)
ALT FLD-CCNC: 18 U/L — SIGNIFICANT CHANGE UP (ref 12–78)
ANION GAP SERPL CALC-SCNC: 9 MMOL/L — SIGNIFICANT CHANGE UP (ref 5–17)
APTT BLD: 30.3 SEC — SIGNIFICANT CHANGE UP (ref 27.5–35.5)
AST SERPL-CCNC: 22 U/L — SIGNIFICANT CHANGE UP (ref 15–37)
BASOPHILS # BLD AUTO: 0.02 K/UL — SIGNIFICANT CHANGE UP (ref 0–0.2)
BASOPHILS NFR BLD AUTO: 0.3 % — SIGNIFICANT CHANGE UP (ref 0–2)
BILIRUB SERPL-MCNC: 0.5 MG/DL — SIGNIFICANT CHANGE UP (ref 0.2–1.2)
BUN SERPL-MCNC: 11 MG/DL — SIGNIFICANT CHANGE UP (ref 7–23)
CALCIUM SERPL-MCNC: 9.3 MG/DL — SIGNIFICANT CHANGE UP (ref 8.5–10.1)
CHLORIDE SERPL-SCNC: 103 MMOL/L — SIGNIFICANT CHANGE UP (ref 96–108)
CO2 SERPL-SCNC: 29 MMOL/L — SIGNIFICANT CHANGE UP (ref 22–31)
CREAT SERPL-MCNC: 0.48 MG/DL — LOW (ref 0.5–1.3)
EOSINOPHIL # BLD AUTO: 0.01 K/UL — SIGNIFICANT CHANGE UP (ref 0–0.5)
EOSINOPHIL NFR BLD AUTO: 0.2 % — SIGNIFICANT CHANGE UP (ref 0–6)
GLUCOSE SERPL-MCNC: 86 MG/DL — SIGNIFICANT CHANGE UP (ref 70–99)
HCT VFR BLD CALC: 28.8 % — LOW (ref 34.5–45)
HGB BLD-MCNC: 9.3 G/DL — LOW (ref 11.5–15.5)
IMM GRANULOCYTES NFR BLD AUTO: 0.3 % — SIGNIFICANT CHANGE UP (ref 0–1.5)
INR BLD: 1.38 RATIO — HIGH (ref 0.88–1.16)
LYMPHOCYTES # BLD AUTO: 0.56 K/UL — LOW (ref 1–3.3)
LYMPHOCYTES # BLD AUTO: 8.6 % — LOW (ref 13–44)
MCHC RBC-ENTMCNC: 30.9 PG — SIGNIFICANT CHANGE UP (ref 27–34)
MCHC RBC-ENTMCNC: 32.3 GM/DL — SIGNIFICANT CHANGE UP (ref 32–36)
MCV RBC AUTO: 95.7 FL — SIGNIFICANT CHANGE UP (ref 80–100)
MONOCYTES # BLD AUTO: 0.77 K/UL — SIGNIFICANT CHANGE UP (ref 0–0.9)
MONOCYTES NFR BLD AUTO: 11.8 % — SIGNIFICANT CHANGE UP (ref 2–14)
NEUTROPHILS # BLD AUTO: 5.14 K/UL — SIGNIFICANT CHANGE UP (ref 1.8–7.4)
NEUTROPHILS NFR BLD AUTO: 78.8 % — HIGH (ref 43–77)
NRBC # BLD: 0 /100 WBCS — SIGNIFICANT CHANGE UP (ref 0–0)
PLATELET # BLD AUTO: 249 K/UL — SIGNIFICANT CHANGE UP (ref 150–400)
POTASSIUM SERPL-MCNC: 3.1 MMOL/L — LOW (ref 3.5–5.3)
POTASSIUM SERPL-SCNC: 3.1 MMOL/L — LOW (ref 3.5–5.3)
PROT SERPL-MCNC: 7 GM/DL — SIGNIFICANT CHANGE UP (ref 6–8.3)
PROTHROM AB SERPL-ACNC: 15.8 SEC — HIGH (ref 10.6–13.6)
RBC # BLD: 3.01 M/UL — LOW (ref 3.8–5.2)
RBC # FLD: 16.8 % — HIGH (ref 10.3–14.5)
SODIUM SERPL-SCNC: 141 MMOL/L — SIGNIFICANT CHANGE UP (ref 135–145)
TSH SERPL-MCNC: 0.78 UIU/ML — SIGNIFICANT CHANGE UP (ref 0.36–3.74)
WBC # BLD: 6.52 K/UL — SIGNIFICANT CHANGE UP (ref 3.8–10.5)
WBC # FLD AUTO: 6.52 K/UL — SIGNIFICANT CHANGE UP (ref 3.8–10.5)

## 2020-12-06 PROCEDURE — 88112 CYTOPATH CELL ENHANCE TECH: CPT | Mod: 26

## 2020-12-06 PROCEDURE — 74019 RADEX ABDOMEN 2 VIEWS: CPT | Mod: 26

## 2020-12-06 PROCEDURE — 88341 IMHCHEM/IMCYTCHM EA ADD ANTB: CPT | Mod: 26,59

## 2020-12-06 PROCEDURE — 74022 RADEX COMPL AQT ABD SERIES: CPT | Mod: 26

## 2020-12-06 PROCEDURE — 99285 EMERGENCY DEPT VISIT HI MDM: CPT

## 2020-12-06 PROCEDURE — 88342 IMHCHEM/IMCYTCHM 1ST ANTB: CPT | Mod: 26,59

## 2020-12-06 PROCEDURE — 93970 EXTREMITY STUDY: CPT | Mod: 26

## 2020-12-06 PROCEDURE — 88360 TUMOR IMMUNOHISTOCHEM/MANUAL: CPT | Mod: 26,59

## 2020-12-06 PROCEDURE — 88305 TISSUE EXAM BY PATHOLOGIST: CPT | Mod: 26

## 2020-12-06 PROCEDURE — 71045 X-RAY EXAM CHEST 1 VIEW: CPT | Mod: 26

## 2020-12-06 RX ORDER — SPIRONOLACTONE 25 MG/1
50 TABLET, FILM COATED ORAL DAILY
Refills: 0 | Status: DISCONTINUED | OUTPATIENT
Start: 2020-12-06 | End: 2020-12-16

## 2020-12-06 RX ORDER — HEPARIN SODIUM 5000 [USP'U]/ML
5000 INJECTION INTRAVENOUS; SUBCUTANEOUS EVERY 12 HOURS
Refills: 0 | Status: DISCONTINUED | OUTPATIENT
Start: 2020-12-06 | End: 2020-12-07

## 2020-12-06 RX ORDER — METOPROLOL TARTRATE 50 MG
25 TABLET ORAL
Refills: 0 | Status: DISCONTINUED | OUTPATIENT
Start: 2020-12-06 | End: 2020-12-16

## 2020-12-06 RX ORDER — PANTOPRAZOLE SODIUM 20 MG/1
40 TABLET, DELAYED RELEASE ORAL DAILY
Refills: 0 | Status: DISCONTINUED | OUTPATIENT
Start: 2020-12-06 | End: 2020-12-16

## 2020-12-06 RX ORDER — SENNA PLUS 8.6 MG/1
2 TABLET ORAL AT BEDTIME
Refills: 0 | Status: DISCONTINUED | OUTPATIENT
Start: 2020-12-06 | End: 2020-12-16

## 2020-12-06 RX ORDER — ONDANSETRON 8 MG/1
4 TABLET, FILM COATED ORAL EVERY 6 HOURS
Refills: 0 | Status: DISCONTINUED | OUTPATIENT
Start: 2020-12-06 | End: 2020-12-16

## 2020-12-06 RX ORDER — LOSARTAN POTASSIUM 100 MG/1
50 TABLET, FILM COATED ORAL DAILY
Refills: 0 | Status: DISCONTINUED | OUTPATIENT
Start: 2020-12-06 | End: 2020-12-16

## 2020-12-06 RX ORDER — POTASSIUM CHLORIDE 20 MEQ
40 PACKET (EA) ORAL EVERY 4 HOURS
Refills: 0 | Status: COMPLETED | OUTPATIENT
Start: 2020-12-06 | End: 2020-12-06

## 2020-12-06 RX ORDER — POLYETHYLENE GLYCOL 3350 17 G/17G
17 POWDER, FOR SOLUTION ORAL DAILY
Refills: 0 | Status: DISCONTINUED | OUTPATIENT
Start: 2020-12-06 | End: 2020-12-16

## 2020-12-06 RX ORDER — FUROSEMIDE 40 MG
40 TABLET ORAL DAILY
Refills: 0 | Status: DISCONTINUED | OUTPATIENT
Start: 2020-12-06 | End: 2020-12-16

## 2020-12-06 RX ADMIN — Medication 40 MILLIEQUIVALENT(S): at 23:11

## 2020-12-06 RX ADMIN — Medication 40 MILLIEQUIVALENT(S): at 18:43

## 2020-12-06 RX ADMIN — SENNA PLUS 2 TABLET(S): 8.6 TABLET ORAL at 23:11

## 2020-12-06 RX ADMIN — HEPARIN SODIUM 5000 UNIT(S): 5000 INJECTION INTRAVENOUS; SUBCUTANEOUS at 23:11

## 2020-12-06 RX ADMIN — Medication 25 MILLIGRAM(S): at 18:42

## 2020-12-06 NOTE — ED ADULT TRIAGE NOTE - CHIEF COMPLAINT QUOTE
sent by pmd for paracentesis for large volume ascites noted on ct scan done monday pt with stomach cancer ?? mets to liver c/o shortness of breath

## 2020-12-06 NOTE — ED ADULT NURSE NOTE - PMH
Acid reflux    Cancer of breast  Rt. Female  Essential hypertension    Obesity (BMI 30.0-34.9)    Seasonal allergies    Sleep apnea  Use Oral Device

## 2020-12-06 NOTE — H&P ADULT - HISTORY OF PRESENT ILLNESS
· HPI Objective Statement: 59 y/o F pmhx HTN, uterine cancer diagnosed sept 2020, s/p chemo and radiation, now with mets to the stomach and liver, pt was sent by her oncologist (Dr Vital) for paracentesis for large volume ascites noted on ct scan that was done on Monday. Pt states she feels stomach is full and has difficulty breathing at times and decreased PO intake.  Pt also has b/l leg swelling. She reports she had x3 episodes of N/V yesterday. Pt has no diarrhea, no CP, no fevers, no chills.

## 2020-12-06 NOTE — H&P ADULT - NSHPPHYSICALEXAM_GEN_ALL_CORE
PHYSICAL EXAM:    GENERAL: NAD, well-groomed, well-developed  HEAD:  Atraumatic, Normocephalic  EYES: EOMI, PERRLA, conjunctiva and sclera clear  ENMT: No tonsillar erythema, exudates, or enlargement; Moist mucous membranes, , No lesions  NECK: Supple, No JVD, Normal thyroid  NERVOUS SYSTEM:  Alert & Oriented X4, ; Motor Strength 5/5 B/L upper and lower extremities; DTRs 2+ intact and symmetric  CHEST/LUNG: Clear  bilaterally; No rales, rhonchi, wheezing, or rubs  HEART: Regular rate and rhythm; No murmurs, rubs, or gallops  ABDOMEN: distended  non  tender no  masses Bowel sounds present  EXTREMITIES:  + Peripheral Pulses, No clubbing, cyanosis, or edema  LYMPH: No lymphadenopathy noted   RECTAL: deferred  BREAST:   deferred( hx  bilateral  breast  reconstruction)  SKIN: No rashes or lesions

## 2020-12-06 NOTE — H&P ADULT - NSHPLABSRESULTS_GEN_ALL_CORE
LABS:                        9.3    6.52  )-----------( 249      ( 06 Dec 2020 14:34 )             28.8     12-06    141  |  103  |  11  ----------------------------<  86  3.1<L>   |  29  |  0.48<L>    Ca    9.3      06 Dec 2020 14:34    TPro  7.0  /  Alb  2.8<L>  /  TBili  0.5  /  DBili  x   /  AST  22  /  ALT  18  /  AlkPhos  89  12-06    PT/INR - ( 06 Dec 2020 14:34 )   PT: 15.8 sec;   INR: 1.38 ratio         PTT - ( 06 Dec 2020 14:34 )  PTT:30.3 sec

## 2020-12-06 NOTE — H&P ADULT - ASSESSMENT
IMPROVE VTE Individual Risk Assessment    RISK                                                                Points    [  ] Previous VTE                                                  3    [  ] Thrombophilia                                               2    [  ] Lower limb paralysis                                      2        (unable to hold up >15 seconds)      [  x] Current Cancer                                              2         (within 6 months)    [ x ] Immobilization > 24 hrs                                1    [  ] ICU/CCU stay > 24 hours                              1    [ x ] Age > 60                                                      1    IMPROVE VTE Score _____4____    IMPROVE Score 0-1: Low Risk, No VTE prophylaxis required for most patients, encourage ambulation.   IMPROVE Score 2-3: At risk, pharmacologic VTE prophylaxis is indicated for most patients (in the absence of a contraindication)  IMPROVE Score > or = 4: High Risk, pharmacologic VTE prophylaxis is indicated for most patients (in the absence of a contraindication)

## 2020-12-06 NOTE — ED PROVIDER NOTE - OBJECTIVE STATEMENT
59 y/o F pmhx HTN, stomach CA since September presents sent by PMD for paracentesis for large volume ascites noted on ct scan that was done on Monday. Pt states she feels stomach is full and has difficulty breathing because of this. Pt also has some leg swelling. She reports she had x3 episodes of N/V yesterday. Pt has no diarrhea, no CP, no fevers, no chills. 59 y/o F pmhx HTN, uterine cancer diagnosed sept 2020, s/p chemo and radiation, now with mets to the stomach and liver, pt was sent by her oncologist (Dr Vital) for paracentesis for large volume ascites noted on ct scan that was done on Monday. Pt states she feels stomach is full and has difficulty breathing at times and decreased PO intake.  Pt also has b/l leg swelling. She reports she had x3 episodes of N/V yesterday. Pt has no diarrhea, no CP, no fevers, no chills.

## 2020-12-07 ENCOUNTER — RESULT REVIEW (OUTPATIENT)
Age: 60
End: 2020-12-07

## 2020-12-07 LAB
ALBUMIN FLD-MCNC: 2.7 G/DL — SIGNIFICANT CHANGE UP
ALBUMIN SERPL ELPH-MCNC: 2.6 G/DL — LOW (ref 3.3–5)
ALP SERPL-CCNC: 94 U/L — SIGNIFICANT CHANGE UP (ref 40–120)
ALT FLD-CCNC: 16 U/L — SIGNIFICANT CHANGE UP (ref 12–78)
ANION GAP SERPL CALC-SCNC: 7 MMOL/L — SIGNIFICANT CHANGE UP (ref 5–17)
AST SERPL-CCNC: 20 U/L — SIGNIFICANT CHANGE UP (ref 15–37)
B PERT IGG+IGM PNL SER: ABNORMAL
BILIRUB SERPL-MCNC: 0.7 MG/DL — SIGNIFICANT CHANGE UP (ref 0.2–1.2)
BUN SERPL-MCNC: 10 MG/DL — SIGNIFICANT CHANGE UP (ref 7–23)
CALCIUM SERPL-MCNC: 9.2 MG/DL — SIGNIFICANT CHANGE UP (ref 8.5–10.1)
CHLORIDE SERPL-SCNC: 106 MMOL/L — SIGNIFICANT CHANGE UP (ref 96–108)
CO2 SERPL-SCNC: 28 MMOL/L — SIGNIFICANT CHANGE UP (ref 22–31)
COLOR FLD: SIGNIFICANT CHANGE UP
CREAT SERPL-MCNC: 0.49 MG/DL — LOW (ref 0.5–1.3)
FLUAV AG NPH QL: SIGNIFICANT CHANGE UP COUNTS
FLUBV AG NPH QL: SIGNIFICANT CHANGE UP COUNTS
FLUID INTAKE SUBSTANCE CLASS: SIGNIFICANT CHANGE UP
FLUID SEGMENTED GRANULOCYTES: SIGNIFICANT CHANGE UP
GLUCOSE FLD-MCNC: 7 MG/DL — SIGNIFICANT CHANGE UP
GLUCOSE SERPL-MCNC: 78 MG/DL — SIGNIFICANT CHANGE UP (ref 70–99)
GRAM STN FLD: SIGNIFICANT CHANGE UP
HCT VFR BLD CALC: 25.3 % — LOW (ref 34.5–45)
HGB BLD-MCNC: 8 G/DL — LOW (ref 11.5–15.5)
MCHC RBC-ENTMCNC: 30.7 PG — SIGNIFICANT CHANGE UP (ref 27–34)
MCHC RBC-ENTMCNC: 31.6 GM/DL — LOW (ref 32–36)
MCV RBC AUTO: 96.9 FL — SIGNIFICANT CHANGE UP (ref 80–100)
NRBC # BLD: 0 /100 WBCS — SIGNIFICANT CHANGE UP (ref 0–0)
PLATELET # BLD AUTO: 235 K/UL — SIGNIFICANT CHANGE UP (ref 150–400)
POTASSIUM SERPL-MCNC: 3.8 MMOL/L — SIGNIFICANT CHANGE UP (ref 3.5–5.3)
POTASSIUM SERPL-SCNC: 3.8 MMOL/L — SIGNIFICANT CHANGE UP (ref 3.5–5.3)
PROT FLD-MCNC: 4.7 G/DL — SIGNIFICANT CHANGE UP
PROT SERPL-MCNC: 6.7 GM/DL — SIGNIFICANT CHANGE UP (ref 6–8.3)
RBC # BLD: 2.61 M/UL — LOW (ref 3.8–5.2)
RBC # FLD: 16.8 % — HIGH (ref 10.3–14.5)
RCV VOL RI: 2000 /UL — HIGH (ref 0–0)
RSV RNA NPH QL NAA+NON-PROBE: SIGNIFICANT CHANGE UP COUNTS
SARS-COV-2 IGG SERPL QL IA: POSITIVE
SARS-COV-2 IGM SERPL IA-ACNC: 22.2 INDEX — HIGH
SARS-COV-2 RNA SPEC QL NAA+PROBE: SIGNIFICANT CHANGE UP COUNTS
SODIUM SERPL-SCNC: 141 MMOL/L — SIGNIFICANT CHANGE UP (ref 135–145)
SPECIMEN SOURCE FLD: SIGNIFICANT CHANGE UP
SPECIMEN SOURCE: SIGNIFICANT CHANGE UP
T3 SERPL-MCNC: 81 NG/DL — SIGNIFICANT CHANGE UP (ref 80–200)
T4 AB SER-ACNC: 8.6 UG/DL — SIGNIFICANT CHANGE UP (ref 4.6–12)
TOTAL NUCLEATED CELL COUNT, BODY FLUID: 195 /UL — SIGNIFICANT CHANGE UP
TUBE TYPE: SIGNIFICANT CHANGE UP
WBC # BLD: 6.22 K/UL — SIGNIFICANT CHANGE UP (ref 3.8–10.5)
WBC # FLD AUTO: 6.22 K/UL — SIGNIFICANT CHANGE UP (ref 3.8–10.5)

## 2020-12-07 PROCEDURE — 49083 ABD PARACENTESIS W/IMAGING: CPT

## 2020-12-07 PROCEDURE — 99221 1ST HOSP IP/OBS SF/LOW 40: CPT | Mod: 25

## 2020-12-07 RX ORDER — ENOXAPARIN SODIUM 100 MG/ML
70 INJECTION SUBCUTANEOUS
Refills: 0 | Status: DISCONTINUED | OUTPATIENT
Start: 2020-12-07 | End: 2020-12-09

## 2020-12-07 RX ORDER — OXYCODONE AND ACETAMINOPHEN 5; 325 MG/1; MG/1
1 TABLET ORAL ONCE
Refills: 0 | Status: DISCONTINUED | OUTPATIENT
Start: 2020-12-07 | End: 2020-12-07

## 2020-12-07 RX ORDER — MORPHINE SULFATE 50 MG/1
2 CAPSULE, EXTENDED RELEASE ORAL EVERY 6 HOURS
Refills: 0 | Status: DISCONTINUED | OUTPATIENT
Start: 2020-12-07 | End: 2020-12-11

## 2020-12-07 RX ORDER — MORPHINE SULFATE 50 MG/1
2 CAPSULE, EXTENDED RELEASE ORAL ONCE
Refills: 0 | Status: DISCONTINUED | OUTPATIENT
Start: 2020-12-07 | End: 2020-12-07

## 2020-12-07 RX ADMIN — OXYCODONE AND ACETAMINOPHEN 1 TABLET(S): 5; 325 TABLET ORAL at 12:41

## 2020-12-07 RX ADMIN — MORPHINE SULFATE 2 MILLIGRAM(S): 50 CAPSULE, EXTENDED RELEASE ORAL at 21:57

## 2020-12-07 RX ADMIN — OXYCODONE AND ACETAMINOPHEN 1 TABLET(S): 5; 325 TABLET ORAL at 13:26

## 2020-12-07 RX ADMIN — Medication 40 MILLIGRAM(S): at 05:25

## 2020-12-07 RX ADMIN — Medication 25 MILLIGRAM(S): at 18:24

## 2020-12-07 RX ADMIN — Medication 25 MILLIGRAM(S): at 05:25

## 2020-12-07 RX ADMIN — MORPHINE SULFATE 2 MILLIGRAM(S): 50 CAPSULE, EXTENDED RELEASE ORAL at 21:27

## 2020-12-07 RX ADMIN — SENNA PLUS 2 TABLET(S): 8.6 TABLET ORAL at 21:24

## 2020-12-07 RX ADMIN — MORPHINE SULFATE 2 MILLIGRAM(S): 50 CAPSULE, EXTENDED RELEASE ORAL at 17:05

## 2020-12-07 RX ADMIN — HEPARIN SODIUM 5000 UNIT(S): 5000 INJECTION INTRAVENOUS; SUBCUTANEOUS at 05:25

## 2020-12-07 RX ADMIN — ENOXAPARIN SODIUM 70 MILLIGRAM(S): 100 INJECTION SUBCUTANEOUS at 23:08

## 2020-12-07 RX ADMIN — PANTOPRAZOLE SODIUM 40 MILLIGRAM(S): 20 TABLET, DELAYED RELEASE ORAL at 12:43

## 2020-12-07 RX ADMIN — POLYETHYLENE GLYCOL 3350 17 GRAM(S): 17 POWDER, FOR SOLUTION ORAL at 12:43

## 2020-12-07 RX ADMIN — SPIRONOLACTONE 50 MILLIGRAM(S): 25 TABLET, FILM COATED ORAL at 05:33

## 2020-12-07 RX ADMIN — MORPHINE SULFATE 2 MILLIGRAM(S): 50 CAPSULE, EXTENDED RELEASE ORAL at 16:49

## 2020-12-07 RX ADMIN — ENOXAPARIN SODIUM 70 MILLIGRAM(S): 100 INJECTION SUBCUTANEOUS at 13:19

## 2020-12-07 RX ADMIN — LOSARTAN POTASSIUM 50 MILLIGRAM(S): 100 TABLET, FILM COATED ORAL at 05:25

## 2020-12-07 NOTE — CONSULT NOTE ADULT - SUBJECTIVE AND OBJECTIVE BOX
Patient is a 60y old  Female who presents with a chief complaint of malignant  ascites (06 Dec 2020 17:06)    IR consulted for paracentesis for malignant ascites  61 y/o F pmhx HTN, uterine cancer diagnosed sept 2020, s/p chemo and radiation, now with mets to the stomach and liver, pt was sent by her oncologist (Dr Vital) for paracentesis for large volume ascites noted on ct scan that was done on Monday. Pt states she feels stomach is full and has difficulty breathing at times and decreased PO intake.  Pt also has b/l leg swelling. She reports she had x3 episodes of N/V yesterday. Pt has no diarrhea, no CP, no fevers, no chills.  Pt c/o abdominal distention            PAST MEDICAL & SURGICAL HISTORY:  Essential hypertension    Cancer of breast  Rt. Female    Sleep apnea  Use Oral Device    Obesity (BMI 30.0-34.9)    Seasonal allergies    Acid reflux    S/P hysterectomy    S/P breast reconstruction, bilateral    S/P bilateral mastectomy    Knee injury  Left &amp; had surgery about 30 years ago        Allergies    No Known Allergies    Intolerances        MEDICATIONS  (STANDING):  furosemide    Tablet 40 milliGRAM(s) Oral daily  heparin   Injectable 5000 Unit(s) SubCutaneous every 12 hours  losartan 50 milliGRAM(s) Oral daily  metoprolol tartrate 25 milliGRAM(s) Oral two times a day  pantoprazole  Injectable 40 milliGRAM(s) IV Push daily  polyethylene glycol 3350 17 Gram(s) Oral daily  senna 2 Tablet(s) Oral at bedtime  spironolactone 50 milliGRAM(s) Oral daily    MEDICATIONS  (PRN):  ondansetron Injectable 4 milliGRAM(s) IV Push every 6 hours PRN Nausea and/or Vomiting        SOCIAL HISTORY:    FAMILY HISTORY:        PHYSICAL EXAM:    Vital Signs Last 24 Hrs  T(C): 37 (07 Dec 2020 05:02), Max: 37.2 (07 Dec 2020 02:22)  T(F): 98.6 (07 Dec 2020 05:02), Max: 98.9 (07 Dec 2020 02:22)  HR: 86 (07 Dec 2020 05:02) (65 - 104)  BP: 140/85 (07 Dec 2020 05:02) (134/88 - 168/96)  BP(mean): --  RR: 20 (07 Dec 2020 05:02) (16 - 21)  SpO2: 99% (07 Dec 2020 05:02) (96% - 100%)    General:  Appears stated age, well-groomed, well-nourished, no distress  Lungs:  CTAB  Cardiovascular:  good S1, S2,   Abdomen:  Soft, TTP, +distended,   Extremities:  no calf tenderness/swelling b/l  Neuro/Psych:  A &O x 3    LABS:                        8.0    6.22  )-----------( 235      ( 07 Dec 2020 07:43 )             25.3     12-07    141  |  106  |  10  ----------------------------<  78  3.8   |  28  |  0.49<L>    Ca    9.2      07 Dec 2020 07:43    TPro  6.7  /  Alb  2.6<L>  /  TBili  0.7  /  DBili  x   /  AST  20  /  ALT  16  /  AlkPhos  94  12-07    PT/INR - ( 06 Dec 2020 14:34 )   PT: 15.8 sec;   INR: 1.38 ratio         PTT - ( 06 Dec 2020 14:34 )  PTT:30.3 sec      RADIOLOGY & ADDITIONAL STUDIES:  < from: CT Abdomen and Pelvis w/ Oral Cont and w/ IV Cont (11.30.20 @ 16:30) >  IMPRESSION:  Small left pleural effusion.  Prominent left supraclavicular lymph nodes.  Interval development of hepatic and splenic lesions, suspicious for metastases.  Large volume of loculated peritoneal fluid.    < end of copied text >          
Reason for Consultation: Uterine Cancer    HPI: Patient is a 60y Female seen on consultatioin for the evaluation and management of Uterine Cancer    61 y/o female with Metastaic Uterine Cancer here for Paracentesis, patient with worsening abdominal distension, patient comaplins of decreasing appetite, loss of weight    PAST MEDICAL & SURGICAL HISTORY:  Essential hypertension    Cancer of breast  Rt. Female    Sleep apnea  Use Oral Device    Obesity (BMI 30.0-34.9)    Seasonal allergies    Acid reflux    S/P hysterectomy    S/P breast reconstruction, bilateral    S/P bilateral mastectomy    Knee injury  Left &amp; had surgery about 30 years ago        MEDICATIONS  (STANDING):  furosemide    Tablet 40 milliGRAM(s) Oral daily  heparin   Injectable 5000 Unit(s) SubCutaneous every 12 hours  losartan 50 milliGRAM(s) Oral daily  metoprolol tartrate 25 milliGRAM(s) Oral two times a day  pantoprazole  Injectable 40 milliGRAM(s) IV Push daily  polyethylene glycol 3350 17 Gram(s) Oral daily  senna 2 Tablet(s) Oral at bedtime  spironolactone 50 milliGRAM(s) Oral daily    MEDICATIONS  (PRN):  ondansetron Injectable 4 milliGRAM(s) IV Push every 6 hours PRN Nausea and/or Vomiting      Allergies    No Known Allergies    Intolerances        SOCIAL HISTORY:    Smoking Status: no  Alcohol: no  Marital Status: yes  Occupation: no      Vital Signs Last 24 Hrs  T(C): 37 (07 Dec 2020 05:02), Max: 37.2 (07 Dec 2020 02:22)  T(F): 98.6 (07 Dec 2020 05:02), Max: 98.9 (07 Dec 2020 02:22)  HR: 86 (07 Dec 2020 05:02) (65 - 104)  BP: 140/85 (07 Dec 2020 05:02) (134/88 - 168/96)  BP(mean): --  RR: 20 (07 Dec 2020 05:02) (16 - 21)  SpO2: 99% (07 Dec 2020 05:02) (96% - 100%)    PHYSICAL EXAM:    general - AAO x 3  HEENT - No Icterus  CVS - RRR  RS - AE B/L  Abd - Distended  Ext - Pulses +. Edmea +        LABS:                        8.0    6.22  )-----------( 235      ( 07 Dec 2020 07:43 )             25.3     12-07    141  |  106  |  10  ----------------------------<  78  3.8   |  28  |  0.49<L>    Ca    9.2      07 Dec 2020 07:43    TPro  6.7  /  Alb  2.6<L>  /  TBili  0.7  /  DBili  x   /  AST  20  /  ALT  16  /  AlkPhos  94  12-07    PT/INR - ( 06 Dec 2020 14:34 )   PT: 15.8 sec;   INR: 1.38 ratio         PTT - ( 06 Dec 2020 14:34 )  PTT:30.3 sec        RADIOLOGY & ADDITIONAL STUDIES:

## 2020-12-07 NOTE — PROGRESS NOTE ADULT - SUBJECTIVE AND OBJECTIVE BOX
Patient is a 60y old  Female who presents with a chief complaint of malignant  ascites (07 Dec 2020 10:29)  patient had  Paracentesis today and 3500 ml of fluid was extracted by IR.    Less distress   Vitals stable.    INTERVAL HPI/OVERNIGHT EVENTS:  PAST MEDICAL & SURGICAL HISTORY:  Essential hypertension    Cancer of breast  Rt. Female    Sleep apnea  Use Oral Device    Obesity (BMI 30.0-34.9)    Seasonal allergies    Acid reflux    S/P hysterectomy    S/P breast reconstruction, bilateral    S/P bilateral mastectomy    Knee injury  Left &amp; had surgery about 30 years ago        MEDICATIONS  (STANDING):  furosemide    Tablet 40 milliGRAM(s) Oral daily  losartan 50 milliGRAM(s) Oral daily  metoprolol tartrate 25 milliGRAM(s) Oral two times a day  pantoprazole  Injectable 40 milliGRAM(s) IV Push daily  polyethylene glycol 3350 17 Gram(s) Oral daily  senna 2 Tablet(s) Oral at bedtime  spironolactone 50 milliGRAM(s) Oral daily    MEDICATIONS  (PRN):  ondansetron Injectable 4 milliGRAM(s) IV Push every 6 hours PRN Nausea and/or Vomiting      Allergies    No Known Allergies    Intolerances        REVIEW OF SYSTEMS:  CONSTITUTIONAL: No fever, weight loss, or fatigue  EYES: No eye pain, visual disturbances, or discharge  ENMT:  No difficulty hearing, tinnitus, vertigo; No sinus or throat pain  NECK: No pain or stiffness  BREASTS: No pain, masses, or nipple discharge  RESPIRATORY: No cough, wheezing, chills or hemoptysis; No shortness of breath  CARDIOVASCULAR: No chest pain, palpitations, dizziness, or leg swelling  GASTROINTESTINAL: No abdominal or epigastric pain. No nausea, vomiting, or hematemesis; No diarrhea or constipation. No melena or hematochezia.  GENITOURINARY: No dysuria, frequency, hematuria, or incontinence  NEUROLOGICAL: No headaches, memory loss, loss of strength, numbness, or tremors  SKIN: No itching, burning, rashes, or lesions   LYMPH NODES: No enlarged glands  ENDOCRINE: No heat or cold intolerance; No hair loss  MUSCULOSKELETAL: No joint pain or swelling; No muscle, back, or extremity pain  PSYCHIATRIC: No depression, anxiety, mood swings, or difficulty sleeping  HEME/LYMPH: No easy bruising, or bleeding gums  ALLERY AND IMMUNOLOGIC: No hives or eczema    Vital Signs Last 24 Hrs  T(C): 37 (07 Dec 2020 05:02), Max: 37.2 (07 Dec 2020 02:22)  T(F): 98.6 (07 Dec 2020 05:02), Max: 98.9 (07 Dec 2020 02:22)  HR: 86 (07 Dec 2020 05:02) (65 - 104)  BP: 140/85 (07 Dec 2020 05:02) (134/88 - 168/96)  BP(mean): --  RR: 20 (07 Dec 2020 05:02) (16 - 21)  SpO2: 99% (07 Dec 2020 05:02) (96% - 100%)    PHYSICAL EXAM:  GENERAL: NAD, well-groomed, well-developed  HEAD:  Atraumatic, Normocephalic  EYES: EOMI, PERRLA, conjunctiva and sclera clear  ENMT: No tonsillar erythema, exudates, or enlargement; Moist mucous membranes, Good dentition, No lesions  NECK: Supple, No JVD, Normal thyroid  NERVOUS SYSTEM:  Alert & Oriented X3, Good concentration; Motor Strength 5/5 B/L upper and lower extremities; DTRs 2+ intact and symmetric  CHEST/LUNG: Clear to percussion bilaterally; No rales, rhonchi, wheezing, or rubs  HEART: Regular rate and rhythm; No murmurs, rubs, or gallops  ABDOMEN: Soft, Nontender, Nondistended; Bowel sounds present  EXTREMITIES:  2+ Peripheral Pulses, No clubbing, cyanosis, or edema  LYMPH: No lymphadenopathy noted  SKIN: No rashes or lesions    LABS:                        8.0    6.22  )-----------( 235      ( 07 Dec 2020 07:43 )             25.3     12-07    141  |  106  |  10  ----------------------------<  78  3.8   |  28  |  0.49<L>    Ca    9.2      07 Dec 2020 07:43    TPro  6.7  /  Alb  2.6<L>  /  TBili  0.7  /  DBili  x   /  AST  20  /  ALT  16  /  AlkPhos  94  12-07      PT/INR - ( 06 Dec 2020 14:34 )   PT: 15.8 sec;   INR: 1.38 ratio         PTT - ( 06 Dec 2020 14:34 )  PTT:30.3 sec    CAPILLARY BLOOD GLUCOSE                    RADIOLOGY & ADDITIONAL TESTS:    Imaging Personally Reviewed:  [ ] YES  [ ] NO    Consultant(s) Notes Reviewed:  [ ] YES  [ ] NO    Care Discussed with Consultants/Other Providers [ ] YES  [ ] NO    Care discussed with family,         [  ]   yes  [  ]  No    imp:    stable[x ]    unstable[  ]     improving [   ]       unchanged  [  ]                Plans:  Continue present plans  [x  ] will obsrve patient for 2 days.  to see how fast fluid reaccumul;ates. . await  fluid analysis results.               New consult [  ]   specialty  .......               order test[  ]    test name.                  Discharge Planning  [  ]

## 2020-12-07 NOTE — CONSULT NOTE ADULT - ASSESSMENT
61 yo female with metastatic cancer and malignant ascites  IR consulted for paracentesis    Plan  -will perform today  -meds, labs and vitals reviewed  -consent obtained from patient
Uterine Cancer

## 2020-12-07 NOTE — CONSULT NOTE ADULT - PROBLEM SELECTOR RECOMMENDATION 9
- for paracentesis  - physical therapy  - DVT - will start Lovenox after procedure, mayneed repeat Paracentesis, as loculated ascites - for paracentesis  - physical therapy  - DVT - please start Lovenox (70mg BID) after procedure, may need repeat Paracentesis, as loculated ascites - d/w PA

## 2020-12-08 LAB
ANION GAP SERPL CALC-SCNC: 7 MMOL/L — SIGNIFICANT CHANGE UP (ref 5–17)
BUN SERPL-MCNC: 6 MG/DL — LOW (ref 7–23)
CALCIUM SERPL-MCNC: 9.2 MG/DL — SIGNIFICANT CHANGE UP (ref 8.5–10.1)
CHLORIDE SERPL-SCNC: 100 MMOL/L — SIGNIFICANT CHANGE UP (ref 96–108)
CO2 SERPL-SCNC: 32 MMOL/L — HIGH (ref 22–31)
CREAT SERPL-MCNC: 0.44 MG/DL — LOW (ref 0.5–1.3)
GLUCOSE SERPL-MCNC: 102 MG/DL — HIGH (ref 70–99)
HCT VFR BLD CALC: 31.1 % — LOW (ref 34.5–45)
HGB BLD-MCNC: 9.5 G/DL — LOW (ref 11.5–15.5)
MCHC RBC-ENTMCNC: 29.9 PG — SIGNIFICANT CHANGE UP (ref 27–34)
MCHC RBC-ENTMCNC: 30.5 GM/DL — LOW (ref 32–36)
MCV RBC AUTO: 97.8 FL — SIGNIFICANT CHANGE UP (ref 80–100)
NRBC # BLD: 0 /100 WBCS — SIGNIFICANT CHANGE UP (ref 0–0)
PLATELET # BLD AUTO: 290 K/UL — SIGNIFICANT CHANGE UP (ref 150–400)
POTASSIUM SERPL-MCNC: 3.2 MMOL/L — LOW (ref 3.5–5.3)
POTASSIUM SERPL-SCNC: 3.2 MMOL/L — LOW (ref 3.5–5.3)
RBC # BLD: 3.18 M/UL — LOW (ref 3.8–5.2)
RBC # FLD: 16.5 % — HIGH (ref 10.3–14.5)
SODIUM SERPL-SCNC: 139 MMOL/L — SIGNIFICANT CHANGE UP (ref 135–145)
WBC # BLD: 7.69 K/UL — SIGNIFICANT CHANGE UP (ref 3.8–10.5)
WBC # FLD AUTO: 7.69 K/UL — SIGNIFICANT CHANGE UP (ref 3.8–10.5)

## 2020-12-08 RX ORDER — HYDROMORPHONE HYDROCHLORIDE 2 MG/ML
1 INJECTION INTRAMUSCULAR; INTRAVENOUS; SUBCUTANEOUS ONCE
Refills: 0 | Status: DISCONTINUED | OUTPATIENT
Start: 2020-12-08 | End: 2020-12-08

## 2020-12-08 RX ORDER — POTASSIUM CHLORIDE 20 MEQ
40 PACKET (EA) ORAL EVERY 4 HOURS
Refills: 0 | Status: COMPLETED | OUTPATIENT
Start: 2020-12-08 | End: 2020-12-08

## 2020-12-08 RX ADMIN — Medication 25 MILLIGRAM(S): at 17:30

## 2020-12-08 RX ADMIN — HYDROMORPHONE HYDROCHLORIDE 1 MILLIGRAM(S): 2 INJECTION INTRAMUSCULAR; INTRAVENOUS; SUBCUTANEOUS at 01:58

## 2020-12-08 RX ADMIN — Medication 40 MILLIEQUIVALENT(S): at 10:45

## 2020-12-08 RX ADMIN — HYDROMORPHONE HYDROCHLORIDE 1 MILLIGRAM(S): 2 INJECTION INTRAMUSCULAR; INTRAVENOUS; SUBCUTANEOUS at 02:28

## 2020-12-08 RX ADMIN — LOSARTAN POTASSIUM 50 MILLIGRAM(S): 100 TABLET, FILM COATED ORAL at 06:16

## 2020-12-08 RX ADMIN — ENOXAPARIN SODIUM 70 MILLIGRAM(S): 100 INJECTION SUBCUTANEOUS at 12:15

## 2020-12-08 RX ADMIN — MORPHINE SULFATE 2 MILLIGRAM(S): 50 CAPSULE, EXTENDED RELEASE ORAL at 21:15

## 2020-12-08 RX ADMIN — MORPHINE SULFATE 2 MILLIGRAM(S): 50 CAPSULE, EXTENDED RELEASE ORAL at 21:30

## 2020-12-08 RX ADMIN — MORPHINE SULFATE 2 MILLIGRAM(S): 50 CAPSULE, EXTENDED RELEASE ORAL at 14:00

## 2020-12-08 RX ADMIN — MORPHINE SULFATE 2 MILLIGRAM(S): 50 CAPSULE, EXTENDED RELEASE ORAL at 13:45

## 2020-12-08 RX ADMIN — Medication 40 MILLIEQUIVALENT(S): at 13:47

## 2020-12-08 RX ADMIN — SPIRONOLACTONE 50 MILLIGRAM(S): 25 TABLET, FILM COATED ORAL at 06:16

## 2020-12-08 RX ADMIN — Medication 25 MILLIGRAM(S): at 06:16

## 2020-12-08 RX ADMIN — Medication 40 MILLIGRAM(S): at 06:16

## 2020-12-08 RX ADMIN — ONDANSETRON 4 MILLIGRAM(S): 8 TABLET, FILM COATED ORAL at 21:17

## 2020-12-08 RX ADMIN — SENNA PLUS 2 TABLET(S): 8.6 TABLET ORAL at 21:17

## 2020-12-08 RX ADMIN — PANTOPRAZOLE SODIUM 40 MILLIGRAM(S): 20 TABLET, DELAYED RELEASE ORAL at 12:15

## 2020-12-08 NOTE — DIETITIAN INITIAL EVALUATION ADULT. - PHYSCIAL ASSESSMENT
underweight Unable to conduct complete nutrition-focused physical exam as pt was resting comfortably & did not want to disturb. Per observation pt c severe temporal & clavicle muscle wasting

## 2020-12-08 NOTE — DIETITIAN INITIAL EVALUATION ADULT. - PERTINENT LABORATORY DATA
12-08 Na139 mmol/L Glu 102 mg/dL<H> K+ 3.2 mmol/L<L> Cr  0.44 mg/dL<L> BUN 6 mg/dL<L> 12-07 Alb 2.6 g/dL<L>

## 2020-12-08 NOTE — DIETITIAN INITIAL EVALUATION ADULT. - OTHER CALCULATIONS
Ht (cm):   170.2    Wt (kg):  67.8 (12/8)   BMI:   23.4    IBW:  61.4 kg   %IBW:  110%   UBW:  80 kg   %UBW: 85%

## 2020-12-08 NOTE — PROGRESS NOTE ADULT - SUBJECTIVE AND OBJECTIVE BOX
Patient is a 60y old  Female who presents with a chief complaint of malignant  ascites (08 Dec 2020 09:37)    patient has less lpain today, feeling less abdominal distention. BNo fever   vitalsstable  INTERVAL HPI/OVERNIGHT EVENTS:  PAST MEDICAL & SURGICAL HISTORY:  Essential hypertension    Cancer of breast  Rt. Female    Sleep apnea  Use Oral Device    Obesity (BMI 30.0-34.9)    Seasonal allergies    Acid reflux    S/P hysterectomy    S/P breast reconstruction, bilateral    S/P bilateral mastectomy    Knee injury  Left &amp; had surgery about 30 years ago        MEDICATIONS  (STANDING):  enoxaparin Injectable 70 milliGRAM(s) SubCutaneous two times a day  furosemide    Tablet 40 milliGRAM(s) Oral daily  losartan 50 milliGRAM(s) Oral daily  metoprolol tartrate 25 milliGRAM(s) Oral two times a day  pantoprazole  Injectable 40 milliGRAM(s) IV Push daily  polyethylene glycol 3350 17 Gram(s) Oral daily  potassium chloride    Tablet ER 40 milliEquivalent(s) Oral every 4 hours  senna 2 Tablet(s) Oral at bedtime  spironolactone 50 milliGRAM(s) Oral daily    MEDICATIONS  (PRN):  morphine  - Injectable 2 milliGRAM(s) IV Push every 6 hours PRN Moderate Pain (4 - 6)  ondansetron Injectable 4 milliGRAM(s) IV Push every 6 hours PRN Nausea and/or Vomiting      Allergies    No Known Allergies    Intolerances        REVIEW OF SYSTEMS:  CONSTITUTIONAL: No fever, weight loss, or fatigue  EYES: No eye pain, visual disturbances, or discharge  ENMT:  No difficulty hearing, tinnitus, vertigo; No sinus or throat pain  NECK: No pain or stiffness  BREASTS: No pain, masses, or nipple discharge  RESPIRATORY: No cough, wheezing, chills or hemoptysis; No shortness of breath  CARDIOVASCULAR: No chest pain, palpitations, dizziness, or leg swelling  GASTROINTESTINAL: No abdominal or epigastric pain. No nausea, vomiting, or hematemesis; No diarrhea or constipation. No melena or hematochezia.  GENITOURINARY: No dysuria, frequency, hematuria, or incontinence  NEUROLOGICAL: No headaches, memory loss, loss of strength, numbness, or tremors  SKIN: No itching, burning, rashes, or lesions   LYMPH NODES: No enlarged glands  ENDOCRINE: No heat or cold intolerance; No hair loss  MUSCULOSKELETAL: No joint pain or swelling; No muscle, back, or extremity pain  PSYCHIATRIC: No depression, anxiety, mood swings, or difficulty sleeping  HEME/LYMPH: No easy bruising, or bleeding gums  ALLERY AND IMMUNOLOGIC: No hives or eczema    Vital Signs Last 24 Hrs  T(C): 36.4 (08 Dec 2020 04:48), Max: 36.8 (07 Dec 2020 11:28)  T(F): 97.5 (08 Dec 2020 04:48), Max: 98.3 (07 Dec 2020 17:00)  HR: 94 (08 Dec 2020 04:48) (84 - 94)  BP: 126/80 (08 Dec 2020 04:48) (126/80 - 148/96)  BP(mean): --  RR: 18 (08 Dec 2020 04:48) (18 - 19)  SpO2: 100% (08 Dec 2020 04:48) (100% - 100%)    PHYSICAL EXAM:  GENERAL: NAD, well-groomed, well-developed  HEAD:  Atraumatic, Normocephalic  EYES: EOMI, PERRLA, conjunctiva and sclera clear  ENMT: No tonsillar erythema, exudates, or enlargement; Moist mucous membranes, Good dentition, No lesions  NECK: Supple, No JVD, Normal thyroid  NERVOUS SYSTEM:  Alert & Oriented X3, Good concentration; Motor Strength 5/5 B/L upper and lower extremities; DTRs 2+ intact and symmetric  CHEST/LUNG: Clear to percussion bilaterally; No rales, rhonchi, wheezing, or rubs  HEART: Regular rate and rhythm; No murmurs, rubs, or gallops  ABDOMEN: Soft, Nontender, Nondistended; Bowel sounds present  EXTREMITIES:  2+ Peripheral Pulses, No clubbing, cyanosis, or edema  LYMPH: No lymphadenopathy noted  SKIN: No rashes or lesions    LABS:                        9.5    7.69  )-----------( 290      ( 08 Dec 2020 06:41 )             31.1     12-08    139  |  100  |  6<L>  ----------------------------<  102<H>  3.2<L>   |  32<H>  |  0.44<L>    Ca    9.2      08 Dec 2020 06:41    TPro  6.7  /  Alb  2.6<L>  /  TBili  0.7  /  DBili  x   /  AST  20  /  ALT  16  /  AlkPhos  94  12-07      PT/INR - ( 06 Dec 2020 14:34 )   PT: 15.8 sec;   INR: 1.38 ratio         PTT - ( 06 Dec 2020 14:34 )  PTT:30.3 sec    CAPILLARY BLOOD GLUCOSE                    RADIOLOGY & ADDITIONAL TESTS:    Imaging Personally Reviewed:  [ ] YES  [ ] NO    Consultant(s) Notes Reviewed:  [ ] YES  [ ] NO    Care Discussed with Consultants/Other Providers [ ] YES  [ ] NO    Care discussed with family,         [  ]   yes  [  ]  No    imp:    stable[ ]    unstable[  ]     improving [ x  ]       unchanged  [  ]                Plans:  Continue present plans  [ x ] as per Oncology               New consult [  ]   specialty  .......               order test[  ]    test name. cbc, bmp in am                 Discharge Planning  [  ]

## 2020-12-08 NOTE — CHART NOTE - TREATMENT: THE FOLLOWING DIET HAS BEEN RECOMMENDED
Diet, Regular:   Supplement Feeding Modality:  Oral  Ensure Clear Cans or Servings Per Day:  1       Frequency:  Three Times a day (12-08-20 @ 15:35) [Pending Verification By Attending]    Diet, DASH/TLC:   Sodium & Cholesterol Restricted (12-06-20 @ 17:20) [Active]

## 2020-12-08 NOTE — PROGRESS NOTE ADULT - SUBJECTIVE AND OBJECTIVE BOX
lying in bed, s/p paracentesis    Vital Signs Last 24 Hrs  T(C): 36.4 (08 Dec 2020 04:48), Max: 36.8 (07 Dec 2020 11:28)  T(F): 97.5 (08 Dec 2020 04:48), Max: 98.3 (07 Dec 2020 17:00)  HR: 94 (08 Dec 2020 04:48) (84 - 94)  BP: 126/80 (08 Dec 2020 04:48) (126/80 - 148/96)  BP(mean): --  RR: 18 (08 Dec 2020 04:48) (18 - 19)  SpO2: 100% (08 Dec 2020 04:48) (100% - 100%)    PHYSICAL EXAM:    general - AAO x 3  HEENT - No Icterus  CVS - RRR  RS - AE B/L  Abd - soft, NT  Ext - Pulses +, edema +        LABS:                        9.5    7.69  )-----------( 290      ( 08 Dec 2020 06:41 )             31.1     12-08    139  |  100  |  6<L>  ----------------------------<  102<H>  3.2<L>   |  32<H>  |  0.44<L>    Ca    9.2      08 Dec 2020 06:41    TPro  6.7  /  Alb  2.6<L>  /  TBili  0.7  /  DBili  x   /  AST  20  /  ALT  16  /  AlkPhos  94  12-07    PT/INR - ( 06 Dec 2020 14:34 )   PT: 15.8 sec;   INR: 1.38 ratio         PTT - ( 06 Dec 2020 14:34 )  PTT:30.3 sec      Culture - Body Fluid with Gram Stain (collected 07 Dec 2020 15:20)  Source: Ascites Fl ascites s/p paracentesis  Gram Stain (07 Dec 2020 19:42):    polymorphonuclear leukocytes seen    No organisms seen    by cytocentrifuge        RADIOLOGY & ADDITIONAL STUDIES:

## 2020-12-08 NOTE — DIETITIAN INITIAL EVALUATION ADULT. - OTHER INFO
Unable to interview pt at this time.  Clinician is familiar c pt; per medical record pt lives c daughter who does shopping & cooking & assists c ADLs as necessary. Pt c uterine Ca c mets to stomach & liver; scheduled to start chemo in January.  Paracentesis (12/7) removed 3400 ml malignant fluid; pt may require additional fluid removal. Wt loss as noted based on previous nutrition assessment (09/11/20). No reports of any N/V at this time; no C/D or chew/swallowing difficulty.

## 2020-12-08 NOTE — DIETITIAN INITIAL EVALUATION ADULT. - ETIOLOGY
Inadequate energy/protein intake + increased energy/protein needs related to metastasized uterine Ca c ascites, SOB

## 2020-12-09 ENCOUNTER — TRANSCRIPTION ENCOUNTER (OUTPATIENT)
Age: 60
End: 2020-12-09

## 2020-12-09 LAB
ANION GAP SERPL CALC-SCNC: 9 MMOL/L — SIGNIFICANT CHANGE UP (ref 5–17)
BUN SERPL-MCNC: 5 MG/DL — LOW (ref 7–23)
CALCIUM SERPL-MCNC: 8.9 MG/DL — SIGNIFICANT CHANGE UP (ref 8.5–10.1)
CHLORIDE SERPL-SCNC: 97 MMOL/L — SIGNIFICANT CHANGE UP (ref 96–108)
CO2 SERPL-SCNC: 30 MMOL/L — SIGNIFICANT CHANGE UP (ref 22–31)
CREAT SERPL-MCNC: 0.49 MG/DL — LOW (ref 0.5–1.3)
GLUCOSE SERPL-MCNC: 85 MG/DL — SIGNIFICANT CHANGE UP (ref 70–99)
HCT VFR BLD CALC: 29.8 % — LOW (ref 34.5–45)
HGB BLD-MCNC: 9.3 G/DL — LOW (ref 11.5–15.5)
MCHC RBC-ENTMCNC: 30.2 PG — SIGNIFICANT CHANGE UP (ref 27–34)
MCHC RBC-ENTMCNC: 31.2 GM/DL — LOW (ref 32–36)
MCV RBC AUTO: 96.8 FL — SIGNIFICANT CHANGE UP (ref 80–100)
NRBC # BLD: 0 /100 WBCS — SIGNIFICANT CHANGE UP (ref 0–0)
PLATELET # BLD AUTO: 277 K/UL — SIGNIFICANT CHANGE UP (ref 150–400)
POTASSIUM SERPL-MCNC: 3.6 MMOL/L — SIGNIFICANT CHANGE UP (ref 3.5–5.3)
POTASSIUM SERPL-SCNC: 3.6 MMOL/L — SIGNIFICANT CHANGE UP (ref 3.5–5.3)
RBC # BLD: 3.08 M/UL — LOW (ref 3.8–5.2)
RBC # FLD: 16.5 % — HIGH (ref 10.3–14.5)
SODIUM SERPL-SCNC: 136 MMOL/L — SIGNIFICANT CHANGE UP (ref 135–145)
WBC # BLD: 5.88 K/UL — SIGNIFICANT CHANGE UP (ref 3.8–10.5)
WBC # FLD AUTO: 5.88 K/UL — SIGNIFICANT CHANGE UP (ref 3.8–10.5)

## 2020-12-09 RX ORDER — APIXABAN 2.5 MG/1
5 TABLET, FILM COATED ORAL EVERY 12 HOURS
Refills: 0 | Status: DISCONTINUED | OUTPATIENT
Start: 2020-12-09 | End: 2020-12-16

## 2020-12-09 RX ORDER — ACETAMINOPHEN 500 MG
650 TABLET ORAL EVERY 6 HOURS
Refills: 0 | Status: DISCONTINUED | OUTPATIENT
Start: 2020-12-09 | End: 2020-12-16

## 2020-12-09 RX ORDER — ANASTROZOLE 1 MG/1
1 TABLET ORAL DAILY
Refills: 0 | Status: DISCONTINUED | OUTPATIENT
Start: 2020-12-09 | End: 2020-12-16

## 2020-12-09 RX ADMIN — Medication 25 MILLIGRAM(S): at 05:55

## 2020-12-09 RX ADMIN — MORPHINE SULFATE 2 MILLIGRAM(S): 50 CAPSULE, EXTENDED RELEASE ORAL at 05:55

## 2020-12-09 RX ADMIN — SENNA PLUS 2 TABLET(S): 8.6 TABLET ORAL at 22:35

## 2020-12-09 RX ADMIN — APIXABAN 5 MILLIGRAM(S): 2.5 TABLET, FILM COATED ORAL at 17:23

## 2020-12-09 RX ADMIN — POLYETHYLENE GLYCOL 3350 17 GRAM(S): 17 POWDER, FOR SOLUTION ORAL at 11:49

## 2020-12-09 RX ADMIN — PANTOPRAZOLE SODIUM 40 MILLIGRAM(S): 20 TABLET, DELAYED RELEASE ORAL at 11:49

## 2020-12-09 RX ADMIN — Medication 40 MILLIGRAM(S): at 05:55

## 2020-12-09 RX ADMIN — Medication 650 MILLIGRAM(S): at 23:35

## 2020-12-09 RX ADMIN — Medication 650 MILLIGRAM(S): at 22:35

## 2020-12-09 RX ADMIN — SPIRONOLACTONE 50 MILLIGRAM(S): 25 TABLET, FILM COATED ORAL at 05:55

## 2020-12-09 RX ADMIN — LOSARTAN POTASSIUM 50 MILLIGRAM(S): 100 TABLET, FILM COATED ORAL at 05:55

## 2020-12-09 RX ADMIN — ENOXAPARIN SODIUM 70 MILLIGRAM(S): 100 INJECTION SUBCUTANEOUS at 05:55

## 2020-12-09 RX ADMIN — MORPHINE SULFATE 2 MILLIGRAM(S): 50 CAPSULE, EXTENDED RELEASE ORAL at 06:10

## 2020-12-09 RX ADMIN — ANASTROZOLE 1 MILLIGRAM(S): 1 TABLET ORAL at 11:49

## 2020-12-09 NOTE — PROGRESS NOTE ADULT - SUBJECTIVE AND OBJECTIVE BOX
Patient is a 60y old  Female who presents with a chief complaint of malignant  ascites (09 Dec 2020 09:20)  Hemodynamically stable  stable for discharge      INTERVAL HPI/OVERNIGHT EVENTS:  PAST MEDICAL & SURGICAL HISTORY:  Essential hypertension    Cancer of breast  Rt. Female    Sleep apnea  Use Oral Device    Obesity (BMI 30.0-34.9)    Seasonal allergies    Acid reflux    S/P hysterectomy    S/P breast reconstruction, bilateral    S/P bilateral mastectomy    Knee injury  Left &amp; had surgery about 30 years ago        MEDICATIONS  (STANDING):  anastrozole 1 milliGRAM(s) Oral daily  apixaban 5 milliGRAM(s) Oral every 12 hours  furosemide    Tablet 40 milliGRAM(s) Oral daily  losartan 50 milliGRAM(s) Oral daily  metoprolol tartrate 25 milliGRAM(s) Oral two times a day  pantoprazole  Injectable 40 milliGRAM(s) IV Push daily  polyethylene glycol 3350 17 Gram(s) Oral daily  senna 2 Tablet(s) Oral at bedtime  spironolactone 50 milliGRAM(s) Oral daily    MEDICATIONS  (PRN):  morphine  - Injectable 2 milliGRAM(s) IV Push every 6 hours PRN Moderate Pain (4 - 6)  ondansetron Injectable 4 milliGRAM(s) IV Push every 6 hours PRN Nausea and/or Vomiting      Allergies    No Known Allergies    Intolerances        REVIEW OF SYSTEMS:  CONSTITUTIONAL: No fever, weight loss, or fatigue  EYES: No eye pain, visual disturbances, or discharge  ENMT:  No difficulty hearing, tinnitus, vertigo; No sinus or throat pain  NECK: No pain or stiffness  BREASTS: No pain, masses, or nipple discharge  RESPIRATORY: No cough, wheezing, chills or hemoptysis; No shortness of breath  CARDIOVASCULAR: No chest pain, palpitations, dizziness, or leg swelling  GASTROINTESTINAL: No abdominal or epigastric pain. No nausea, vomiting, or hematemesis; No diarrhea or constipation. No melena or hematochezia.  GENITOURINARY: No dysuria, frequency, hematuria, or incontinence  NEUROLOGICAL: No headaches, memory loss, loss of strength, numbness, or tremors  SKIN: No itching, burning, rashes, or lesions   LYMPH NODES: No enlarged glands  ENDOCRINE: No heat or cold intolerance; No hair loss  MUSCULOSKELETAL: No joint pain or swelling; No muscle, back, or extremity pain  PSYCHIATRIC: No depression, anxiety, mood swings, or difficulty sleeping  HEME/LYMPH: No easy bruising, or bleeding gums  ALLERY AND IMMUNOLOGIC: No hives or eczema    Vital Signs Last 24 Hrs  T(C): 36.9 (09 Dec 2020 05:00), Max: 37.1 (08 Dec 2020 23:31)  T(F): 98.5 (09 Dec 2020 05:00), Max: 98.8 (08 Dec 2020 23:31)  HR: 98 (09 Dec 2020 05:00) (88 - 100)  BP: 105/68 (09 Dec 2020 05:00) (104/70 - 118/82)  BP(mean): --  RR: 17 (09 Dec 2020 05:00) (17 - 18)  SpO2: 98% (09 Dec 2020 05:00) (94% - 100%)    PHYSICAL EXAM:  GENERAL: NAD, well-groomed, well-developed  HEAD:  Atraumatic, Normocephalic  EYES: EOMI, PERRLA, conjunctiva and sclera clear  ENMT: No tonsillar erythema, exudates, or enlargement; Moist mucous membranes, Good dentition, No lesions  NECK: Supple, No JVD, Normal thyroid  NERVOUS SYSTEM:  Alert & Oriented X3, Good concentration; Motor Strength 5/5 B/L upper and lower extremities; DTRs 2+ intact and symmetric. functionally stable.  CHEST/LUNG: Clear to percussion bilaterally; No rales, rhonchi, wheezing, or rubs  HEART: Regular rate and rhythm; No murmurs, rubs, or gallops  ABDOMEN: Soft, Nontender, Nondistended; Bowel sounds present  EXTREMITIES:  2+ Peripheral Pulses, No clubbing, cyanosis, or edema  LYMPH: No lymphadenopathy noted  SKIN: No rashes or lesions    LABS:                        9.3    5.88  )-----------( 277      ( 09 Dec 2020 07:56 )             29.8     12-09    136  |  97  |  5<L>  ----------------------------<  85  3.6   |  30  |  0.49<L>    Ca    8.9      09 Dec 2020 07:56            CAPILLARY BLOOD GLUCOSE                    RADIOLOGY & ADDITIONAL TESTS:    Imaging Personally Reviewed:  [ ] YES  [ ] NO    Consultant(s) Notes Reviewed:  [ ] YES  [ ] NO    Care Discussed with Consultants/Other Providers [ ] YES  [ ] NO    Care discussed with family,         [  ]   yes  [  ]  No    imp:    stable[x ]    unstable[  ]     improving [   ]       unchanged  [  ]                Plans:  Continue present plans  [ x ] discharge to Rehab               New consult [  ]   specialty  .......               order test[  ]    test name.                  Discharge Planning  [  ]

## 2020-12-09 NOTE — DISCHARGE NOTE PROVIDER - PROVIDER TOKENS
FREE:[LAST:[PCP],PHONE:[(   )    -],FAX:[(   )    -],FOLLOWUP:[1 week]],PROVIDER:[TOKEN:[[id number]]] PROVIDER:[TOKEN:[[id number]]],FREE:[LAST:[PCP],PHONE:[(   )    -],FAX:[(   )    -],FOLLOWUP:[1 week]],PROVIDER:[TOKEN:[[id number]]]

## 2020-12-09 NOTE — PROGRESS NOTE ADULT - SUBJECTIVE AND OBJECTIVE BOX
feels weak and tired      Vital Signs Last 24 Hrs  T(C): 36.9 (09 Dec 2020 05:00), Max: 37.1 (08 Dec 2020 23:31)  T(F): 98.5 (09 Dec 2020 05:00), Max: 98.8 (08 Dec 2020 23:31)  HR: 98 (09 Dec 2020 05:00) (88 - 100)  BP: 105/68 (09 Dec 2020 05:00) (104/70 - 118/82)  BP(mean): --  RR: 17 (09 Dec 2020 05:00) (17 - 18)  SpO2: 98% (09 Dec 2020 05:00) (94% - 100%)    PHYSICAL EXAM:    general - AAO x 3  HEENT - No Icterus  CVS - RRR  RS - AE B/L  Abd - soft, distended +  Ext - Pulses +        LABS:                        9.3    5.88  )-----------( 277      ( 09 Dec 2020 07:56 )             29.8     12-09    136  |  97  |  5<L>  ----------------------------<  85  3.6   |  30  |  0.49<L>    Ca    8.9      09 Dec 2020 07:56            Culture - Body Fluid with Gram Stain (collected 07 Dec 2020 15:20)  Source: Ascites Fl ascites s/p paracentesis  Gram Stain (prelim) (08 Dec 2020 12:26):    polymorphonuclear leukocytes seen    No organisms seen    by cytocentrifuge  Preliminary Report (08 Dec 2020 12:26):    No growth        RADIOLOGY & ADDITIONAL STUDIES:

## 2020-12-09 NOTE — DISCHARGE NOTE PROVIDER - HOSPITAL COURSE
Pt is 59 y/o female with PMHx HTN, uterine cancer diagnosed sept 2020, s/p chemo and radiation, now with mets to the stomach and liver.  Pt was sent by her oncologist (Dr Vital) for paracentesis for large volume ascites noted on ct scan that was done on Monday. Pt states she feels stomach is full and has difficulty breathing at times and decreased PO intake.  Pt also has b/l leg swelling. She reports she had x3 episodes on 12/5. Pt has no diarrhea, no CP, no fevers, no chills.  Admitted for malignant ascites.    Malignant ascites.   -Heme/onc consulted, Dr. Vital  -s/p US guided paracentesis, 3500mL removed.  -Abdominal distention improved  -PT evaluation  -Stable, follow up outpatient    Acid reflux.    -Stable, on Protonix  -Continue with home meds    Essential hypertension.    -Stable on metoprolol, cozaar  -Continue with home meds    Patient was seen and examined by Dr. Grande, deemed stable for discharge. Pt is 61 y/o female with PMHx HTN, uterine cancer diagnosed sept 2020, s/p chemo and radiation, now with mets to the stomach and liver.  Pt was sent by her oncologist (Dr Vital) for paracentesis for large volume ascites noted on ct scan that was done on Monday. Pt states she feels stomach is full and has difficulty breathing at times and decreased PO intake.  Pt also has b/l leg swelling. She reports she had x3 episodes on 12/5. Pt has no diarrhea, no CP, no fevers, no chills.  Admitted for malignant ascites.    Malignant ascites.   -Heme/onc consulted, Dr. Vital  -s/p US guided paracentesis, 3500mL removed.  -Abdominal distention improved  -PT evaluation  -Stable, follow up outpatient    Acid reflux.    -Stable, on Protonix  -Continue with home meds    Essential hypertension.    -Stable on metoprolol, cozaar  -Continue with home meds   patient will be discharged on Eliquis BID 5 mg for dvt.    Patient was seen and examined by Dr. Grande, deemed stable for discharge.

## 2020-12-09 NOTE — PROGRESS NOTE ADULT - PROBLEM SELECTOR PLAN 1
- Physicla Therapy  - may need rehab  - start arimidex fro Uterine cancer  - can change lovenox to eliquis

## 2020-12-09 NOTE — DISCHARGE NOTE PROVIDER - NSDCMRMEDTOKEN_GEN_ALL_CORE_FT
Bystolic 5 mg oral tablet: 1 tab(s) orally once a day  docusate sodium 100 mg oral capsule: 1 cap(s) orally once a day  lisinopril-hydrochlorothiazide 10 mg-12.5 mg oral tablet: 1 tab(s) orally once a day  omeprazole 40 mg oral delayed release capsule: 1 cap(s) orally once a day  Vitamin D2 2000 intl units oral capsule: 1 cap(s) orally once a day   Bystolic 5 mg oral tablet: 1 tab(s) orally once a day  docusate sodium 100 mg oral capsule: 1 cap(s) orally once a day  Eliquis 5 mg oral tablet: 1 tab(s) orally 2 times a day  lisinopril-hydrochlorothiazide 10 mg-12.5 mg oral tablet: 1 tab(s) orally once a day  omeprazole 40 mg oral delayed release capsule: 1 cap(s) orally once a day  Vitamin D2 2000 intl units oral capsule: 1 cap(s) orally once a day

## 2020-12-09 NOTE — DISCHARGE NOTE PROVIDER - NSDCCPCAREPLAN_GEN_ALL_CORE_FT
PRINCIPAL DISCHARGE DIAGNOSIS  Diagnosis: Abdominal ascites  Assessment and Plan of Treatment: s/p US guided paracentesis, improved, stable.      SECONDARY DISCHARGE DIAGNOSES  Diagnosis: Acid reflux  Assessment and Plan of Treatment: Stable, continue with home meds    Diagnosis: Essential hypertension  Assessment and Plan of Treatment: Stable, continue with home meds    Diagnosis: Metastasis from cancer of uterus  Assessment and Plan of Treatment: Stable, follow up outpatient heme/onc

## 2020-12-09 NOTE — DISCHARGE NOTE PROVIDER - CARE PROVIDER_API CALL
PCP,   Phone: (   )    -  Fax: (   )    -  Follow Up Time: 1 week    Charley Vital  HEMATOLOGY  [address], [address]  [place] [address]  Phone: [phone]  Fax: [phone]  Follow Up Time:    Charley Vital  HEMATOLOGY  [address] [facility] [address], [address]  [place] [address]  Phone: [phone]  Fax: [phone]  Follow Up Time:     PCP,   Phone: (   )    -  Fax: (   )    -  Follow Up Time: 1 week    Cali Yang  CARDIOLOGY  [address], [address]  [place] [address]  Phone: [phone]  Fax: [phone]  Follow Up Time:

## 2020-12-09 NOTE — DISCHARGE NOTE PROVIDER - NSTOBACCOUSAGEY/N_GEN_A_CS
No Cheek-To-Nose Interpolation Flap Text: We discussed various closure modalities with the patient, including healing by second intention, primary closure, skin graft and various flaps.  The location and configuration of the defect indicated that a cheek to nose interpolation flap would result in the least disturbance of the position and function of the surrounding anatomic structures, and provide the best result.  The technique, its benefits, alternatives and risks were discussed with the patient.  The patient underwent the procedure as follows: The patient was positioned supine on the operating room table.  The area of the defect and the surrounding skin were anesthetized with buffered 1% lidocaine with epinephrine.  The area was washed with chlorhexidine.  Sterile drapes were applied. \\n\\nA decision was made to reconstruct the defect utilizing an interpolation axial flap and a staged reconstruction.  A telfa template was made of the defect.  This telfa template was then used to outline the Cheek-To-Nose Interpolation flap.  The flap was excised through the skin and subcutaneous tissue down to the layer of the underlying musculature.  The interpolation flap was carefully excised within this deep plane to maintain its blood supply.  The edges of the donor site were undermined.   The donor site was closed in a primary fashion.  The pedicle was then rotated into position and sutured.  Once the tube was sutured into place, adequate blood supply was confirmed with blanching and refill.  The pedicle was then wrapped with xeroform gauze and dressed appropriately with a telfa and gauze bandage to ensure continued blood supply and protect the attached pedicle.  The second stage of the flap (takedown) would occur after sufficient blood supply has been established, at about 3 weeks.

## 2020-12-10 LAB
AMYLASE P1 CFR SERPL: 30 U/L — SIGNIFICANT CHANGE UP (ref 25–115)
ANION GAP SERPL CALC-SCNC: 8 MMOL/L — SIGNIFICANT CHANGE UP (ref 5–17)
APTT BLD: 34.3 SEC — SIGNIFICANT CHANGE UP (ref 27.5–35.5)
BUN SERPL-MCNC: 7 MG/DL — SIGNIFICANT CHANGE UP (ref 7–23)
CALCIUM SERPL-MCNC: 8.9 MG/DL — SIGNIFICANT CHANGE UP (ref 8.5–10.1)
CHLORIDE SERPL-SCNC: 98 MMOL/L — SIGNIFICANT CHANGE UP (ref 96–108)
CO2 SERPL-SCNC: 32 MMOL/L — HIGH (ref 22–31)
CREAT SERPL-MCNC: 0.62 MG/DL — SIGNIFICANT CHANGE UP (ref 0.5–1.3)
GLUCOSE SERPL-MCNC: 80 MG/DL — SIGNIFICANT CHANGE UP (ref 70–99)
INR BLD: 1.58 RATIO — HIGH (ref 0.88–1.16)
LIDOCAIN IGE QN: 64 U/L — LOW (ref 73–393)
POTASSIUM SERPL-MCNC: 3.2 MMOL/L — LOW (ref 3.5–5.3)
POTASSIUM SERPL-SCNC: 3.2 MMOL/L — LOW (ref 3.5–5.3)
PROTHROM AB SERPL-ACNC: 17.9 SEC — HIGH (ref 10.6–13.6)
SODIUM SERPL-SCNC: 138 MMOL/L — SIGNIFICANT CHANGE UP (ref 135–145)

## 2020-12-10 PROCEDURE — 74177 CT ABD & PELVIS W/CONTRAST: CPT | Mod: 26

## 2020-12-10 RX ORDER — IOHEXOL 300 MG/ML
30 INJECTION, SOLUTION INTRAVENOUS ONCE
Refills: 0 | Status: COMPLETED | OUTPATIENT
Start: 2020-12-10 | End: 2020-12-10

## 2020-12-10 RX ADMIN — IOHEXOL 30 MILLILITER(S): 300 INJECTION, SOLUTION INTRAVENOUS at 09:40

## 2020-12-10 RX ADMIN — PANTOPRAZOLE SODIUM 40 MILLIGRAM(S): 20 TABLET, DELAYED RELEASE ORAL at 11:38

## 2020-12-10 RX ADMIN — Medication 40 MILLIGRAM(S): at 05:47

## 2020-12-10 RX ADMIN — APIXABAN 5 MILLIGRAM(S): 2.5 TABLET, FILM COATED ORAL at 17:12

## 2020-12-10 RX ADMIN — ANASTROZOLE 1 MILLIGRAM(S): 1 TABLET ORAL at 11:38

## 2020-12-10 RX ADMIN — Medication 25 MILLIGRAM(S): at 05:47

## 2020-12-10 RX ADMIN — APIXABAN 5 MILLIGRAM(S): 2.5 TABLET, FILM COATED ORAL at 05:47

## 2020-12-10 RX ADMIN — SPIRONOLACTONE 50 MILLIGRAM(S): 25 TABLET, FILM COATED ORAL at 05:47

## 2020-12-10 RX ADMIN — Medication 650 MILLIGRAM(S): at 05:47

## 2020-12-10 RX ADMIN — ONDANSETRON 4 MILLIGRAM(S): 8 TABLET, FILM COATED ORAL at 09:43

## 2020-12-10 RX ADMIN — SENNA PLUS 2 TABLET(S): 8.6 TABLET ORAL at 21:24

## 2020-12-10 RX ADMIN — Medication 650 MILLIGRAM(S): at 06:45

## 2020-12-10 NOTE — PROGRESS NOTE ADULT - SUBJECTIVE AND OBJECTIVE BOX
Patient is a 60y old  Female who presents with a chief complaint of malignant  ascites (10 Dec 2020 09:09)  patient complaining of abdominal pain today   Ct scan ordered by Dr Vital-   will hold discharge    INTERVAL HPI/OVERNIGHT EVENTS:  PAST MEDICAL & SURGICAL HISTORY:  Essential hypertension    Cancer of breast  Rt. Female    Sleep apnea  Use Oral Device    Obesity (BMI 30.0-34.9)    Seasonal allergies    Acid reflux    S/P hysterectomy    S/P breast reconstruction, bilateral    S/P bilateral mastectomy    Knee injury  Left &amp; had surgery about 30 years ago        MEDICATIONS  (STANDING):  anastrozole 1 milliGRAM(s) Oral daily  apixaban 5 milliGRAM(s) Oral every 12 hours  furosemide    Tablet 40 milliGRAM(s) Oral daily  losartan 50 milliGRAM(s) Oral daily  metoprolol tartrate 25 milliGRAM(s) Oral two times a day  pantoprazole  Injectable 40 milliGRAM(s) IV Push daily  polyethylene glycol 3350 17 Gram(s) Oral daily  senna 2 Tablet(s) Oral at bedtime  spironolactone 50 milliGRAM(s) Oral daily    MEDICATIONS  (PRN):  acetaminophen   Tablet .. 650 milliGRAM(s) Oral every 6 hours PRN Mild Pain (1 - 3)  morphine  - Injectable 2 milliGRAM(s) IV Push every 6 hours PRN Moderate Pain (4 - 6)  ondansetron Injectable 4 milliGRAM(s) IV Push every 6 hours PRN Nausea and/or Vomiting      Allergies    No Known Allergies    Intolerances        REVIEW OF SYSTEMS:  CONSTITUTIONAL: No fever, weight loss, or fatigue  EYES: No eye pain, visual disturbances, or discharge  ENMT:  No difficulty hearing, tinnitus, vertigo; No sinus or throat pain  NECK: No pain or stiffness  BREASTS: No pain, masses, or nipple discharge  RESPIRATORY: No cough, wheezing, chills or hemoptysis; No shortness of breath  CARDIOVASCULAR: No chest pain, palpitations, dizziness, or leg swelling  GASTROINTESTINAL: No abdominal or epigastric pain. No nausea, vomiting, or hematemesis; No diarrhea or constipation. No melena or hematochezia.  GENITOURINARY: No dysuria, frequency, hematuria, or incontinence  NEUROLOGICAL: No headaches, memory loss, loss of strength, numbness, or tremors  SKIN: No itching, burning, rashes, or lesions   LYMPH NODES: No enlarged glands  ENDOCRINE: No heat or cold intolerance; No hair loss  MUSCULOSKELETAL: No joint pain or swelling; No muscle, back, or extremity pain  PSYCHIATRIC: No depression, anxiety, mood swings, or difficulty sleeping  HEME/LYMPH: No easy bruising, or bleeding gums  ALLERY AND IMMUNOLOGIC: No hives or eczema    Vital Signs Last 24 Hrs  T(C): 36.9 (10 Dec 2020 05:44), Max: 37.2 (09 Dec 2020 11:48)  T(F): 98.5 (10 Dec 2020 05:44), Max: 98.9 (09 Dec 2020 11:48)  HR: 107 (10 Dec 2020 05:44) (107 - 119)  BP: 113/79 (10 Dec 2020 05:44) (91/65 - 113/79)  BP(mean): --  RR: 18 (10 Dec 2020 05:44) (17 - 18)  SpO2: 96% (10 Dec 2020 05:44) (95% - 97%)    PHYSICAL EXAM:  GENERAL: NAD, well-groomed, well-developed  HEAD:  Atraumatic, Normocephalic  EYES: EOMI, PERRLA, conjunctiva and sclera clear  ENMT: No tonsillar erythema, exudates, or enlargement; Moist mucous membranes, Good dentition, No lesions  NECK: Supple, No JVD, Normal thyroid  NERVOUS SYSTEM:  Alert & Oriented X3, Good concentration; Motor Strength 5/5 B/L upper and lower extremities; DTRs 2+ intact and symmetric  CHEST/LUNG: Clear to percussion bilaterally; No rales, rhonchi, wheezing, or rubs  HEART: Regular rate and rhythm; No murmurs, rubs, or gallops  ABDOMEN: Soft, Nontender, Nondistended; Bowel sounds present. not tense.  EXTREMITIES:  2+ Peripheral Pulses, No clubbing, cyanosis, or edema  LYMPH: No lymphadenopathy noted  SKIN: No rashes or lesions    LABS:                        9.3    5.88  )-----------( 277      ( 09 Dec 2020 07:56 )             29.8     12-09    136  |  97  |  5<L>  ----------------------------<  85  3.6   |  30  |  0.49<L>    Ca    8.9      09 Dec 2020 07:56        PT/INR - ( 10 Dec 2020 06:38 )   PT: 17.9 sec;   INR: 1.58 ratio         PTT - ( 10 Dec 2020 06:38 )  PTT:34.3 sec    CAPILLARY BLOOD GLUCOSE                    RADIOLOGY & ADDITIONAL TESTS:    Imaging Personally Reviewed:  [ ] YES  [ ] NO    Consultant(s) Notes Reviewed:  [ ] YES  [ ] NO    Care Discussed with Consultants/Other Providers [ ] YES  [ ] NO    Care discussed with family,         [  ]   yes  [  ]  No    imp:    stable[ ]    unstable[  ]     improving [   ]       unchanged  [ x ]                Plans:  Continue present plans  [x  ] ct scan today                New consult [  ]   specialty  .......               order test[  ]    test name.  labs in am                Discharge Planning  [  ]

## 2020-12-10 NOTE — PROGRESS NOTE ADULT - SUBJECTIVE AND OBJECTIVE BOX
lying in bed, complaing of pain in abdomen    Vital Signs Last 24 Hrs  T(C): 36.9 (10 Dec 2020 05:44), Max: 37.2 (09 Dec 2020 11:48)  T(F): 98.5 (10 Dec 2020 05:44), Max: 98.9 (09 Dec 2020 11:48)  HR: 107 (10 Dec 2020 05:44) (107 - 119)  BP: 113/79 (10 Dec 2020 05:44) (91/65 - 113/79)  BP(mean): --  RR: 18 (10 Dec 2020 05:44) (17 - 18)  SpO2: 96% (10 Dec 2020 05:44) (95% - 97%)    PHYSICAL EXAM:    general - AAO x 3  HEENT - No Icterus  CVS - RRR  RS - AE B/L  Abd - soft, NT, distended +  Ext - Pulses +        LABS:                        9.3    5.88  )-----------( 277      ( 09 Dec 2020 07:56 )             29.8     12-09    136  |  97  |  5<L>  ----------------------------<  85  3.6   |  30  |  0.49<L>    Ca    8.9      09 Dec 2020 07:56      PT/INR - ( 10 Dec 2020 06:38 )   PT: 17.9 sec;   INR: 1.58 ratio         PTT - ( 10 Dec 2020 06:38 )  PTT:34.3 sec      Culture - Body Fluid with Gram Stain (collected 07 Dec 2020 15:20)  Source: Ascites Fl ascites s/p paracentesis  Gram Stain (prelim) (08 Dec 2020 12:26):    polymorphonuclear leukocytes seen    No organisms seen    by cytocentrifuge  Preliminary Report (08 Dec 2020 12:26):    No growth        RADIOLOGY & ADDITIONAL STUDIES:

## 2020-12-10 NOTE — PROGRESS NOTE ADULT - PROBLEM SELECTOR PLAN 1
- repeat CT abdomen/Pelvis, patient complaing of abdominal pain  - Physical Therapy  - Eliquis  - supportive care

## 2020-12-11 LAB
HCT VFR BLD CALC: 27.5 % — LOW (ref 34.5–45)
HGB BLD-MCNC: 8.7 G/DL — LOW (ref 11.5–15.5)
MCHC RBC-ENTMCNC: 30.3 PG — SIGNIFICANT CHANGE UP (ref 27–34)
MCHC RBC-ENTMCNC: 31.6 GM/DL — LOW (ref 32–36)
MCV RBC AUTO: 95.8 FL — SIGNIFICANT CHANGE UP (ref 80–100)
NON-GYNECOLOGICAL CYTOLOGY STUDY: SIGNIFICANT CHANGE UP
NRBC # BLD: 0 /100 WBCS — SIGNIFICANT CHANGE UP (ref 0–0)
PLATELET # BLD AUTO: 285 K/UL — SIGNIFICANT CHANGE UP (ref 150–400)
RBC # BLD: 2.87 M/UL — LOW (ref 3.8–5.2)
RBC # FLD: 16.6 % — HIGH (ref 10.3–14.5)
WBC # BLD: 5.5 K/UL — SIGNIFICANT CHANGE UP (ref 3.8–10.5)
WBC # FLD AUTO: 5.5 K/UL — SIGNIFICANT CHANGE UP (ref 3.8–10.5)

## 2020-12-11 PROCEDURE — 49083 ABD PARACENTESIS W/IMAGING: CPT

## 2020-12-11 RX ORDER — TRAMADOL HYDROCHLORIDE 50 MG/1
25 TABLET ORAL THREE TIMES A DAY
Refills: 0 | Status: DISCONTINUED | OUTPATIENT
Start: 2020-12-11 | End: 2020-12-16

## 2020-12-11 RX ORDER — MORPHINE SULFATE 50 MG/1
2 CAPSULE, EXTENDED RELEASE ORAL ONCE
Refills: 0 | Status: DISCONTINUED | OUTPATIENT
Start: 2020-12-11 | End: 2020-12-11

## 2020-12-11 RX ORDER — HYDROMORPHONE HYDROCHLORIDE 2 MG/ML
2 INJECTION INTRAMUSCULAR; INTRAVENOUS; SUBCUTANEOUS
Refills: 0 | Status: DISCONTINUED | OUTPATIENT
Start: 2020-12-11 | End: 2020-12-15

## 2020-12-11 RX ADMIN — Medication 40 MILLIGRAM(S): at 05:55

## 2020-12-11 RX ADMIN — MORPHINE SULFATE 2 MILLIGRAM(S): 50 CAPSULE, EXTENDED RELEASE ORAL at 18:45

## 2020-12-11 RX ADMIN — MORPHINE SULFATE 2 MILLIGRAM(S): 50 CAPSULE, EXTENDED RELEASE ORAL at 18:31

## 2020-12-11 RX ADMIN — Medication 650 MILLIGRAM(S): at 15:50

## 2020-12-11 RX ADMIN — Medication 25 MILLIGRAM(S): at 05:55

## 2020-12-11 RX ADMIN — Medication 650 MILLIGRAM(S): at 19:03

## 2020-12-11 RX ADMIN — ANASTROZOLE 1 MILLIGRAM(S): 1 TABLET ORAL at 13:43

## 2020-12-11 RX ADMIN — TRAMADOL HYDROCHLORIDE 25 MILLIGRAM(S): 50 TABLET ORAL at 18:26

## 2020-12-11 RX ADMIN — APIXABAN 5 MILLIGRAM(S): 2.5 TABLET, FILM COATED ORAL at 05:55

## 2020-12-11 RX ADMIN — Medication 650 MILLIGRAM(S): at 01:45

## 2020-12-11 RX ADMIN — APIXABAN 5 MILLIGRAM(S): 2.5 TABLET, FILM COATED ORAL at 17:25

## 2020-12-11 RX ADMIN — PANTOPRAZOLE SODIUM 40 MILLIGRAM(S): 20 TABLET, DELAYED RELEASE ORAL at 13:43

## 2020-12-11 RX ADMIN — Medication 25 MILLIGRAM(S): at 17:26

## 2020-12-11 RX ADMIN — SENNA PLUS 2 TABLET(S): 8.6 TABLET ORAL at 22:05

## 2020-12-11 RX ADMIN — SPIRONOLACTONE 50 MILLIGRAM(S): 25 TABLET, FILM COATED ORAL at 05:55

## 2020-12-11 RX ADMIN — TRAMADOL HYDROCHLORIDE 25 MILLIGRAM(S): 50 TABLET ORAL at 17:26

## 2020-12-11 NOTE — PROGRESS NOTE ADULT - SUBJECTIVE AND OBJECTIVE BOX
lying in bed, repeat CT showed Ascites    Vital Signs Last 24 Hrs  T(C): 37.1 (11 Dec 2020 05:59), Max: 37.1 (10 Dec 2020 17:27)  T(F): 98.7 (11 Dec 2020 05:59), Max: 98.7 (10 Dec 2020 17:27)  HR: 103 (11 Dec 2020 05:59) (93 - 110)  BP: 110/76 (11 Dec 2020 05:59) (99/67 - 110/76)  BP(mean): --  RR: 18 (11 Dec 2020 05:59) (18 - 18)  SpO2: 96% (11 Dec 2020 05:59) (94% - 97%)    PHYSICAL EXAM:    general - AAO x 3  HEENT - No Icterus  CVS - RRR  RS - AE B/L  Abd - soft, distended +  Ext - Pulses +        LABS:                        8.7    5.50  )-----------( 285      ( 11 Dec 2020 07:43 )             27.5     12-10    138  |  98  |  7   ----------------------------<  80  3.2<L>   |  32<H>  |  0.62    Ca    8.9      10 Dec 2020 11:02      PT/INR - ( 10 Dec 2020 06:38 )   PT: 17.9 sec;   INR: 1.58 ratio         PTT - ( 10 Dec 2020 06:38 )  PTT:34.3 sec      Culture - Body Fluid with Gram Stain (collected 07 Dec 2020 15:20)  Source: Ascites Fl ascites s/p paracentesis  Gram Stain (prelim) (08 Dec 2020 12:26):    polymorphonuclear leukocytes seen    No organisms seen    by cytocentrifuge  Preliminary Report (08 Dec 2020 12:26):    No growth        RADIOLOGY & ADDITIONAL STUDIES:

## 2020-12-11 NOTE — CHART NOTE - NSCHARTNOTEFT_GEN_A_CORE
Pt admitted c large volume ascites, SOB, early satiety, decreased PO intake, b/l leg swelling, N/V. 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   PMHx HTN, breast Ca c mastectomy & reconstruction.     Factors impacting intake: [ ] none [ ] nausea  [ ] vomiting [ ] diarrhea [ ] constipation  [ ]chewing problems [ ] swallowing issues  [X ] other: pain, early satiety, SOB    Diet Prescription: Diet, Regular:   Supplement Feeding Modality:  Oral  Ensure Clear Cans or Servings Per Day:  1       Frequency:  Three Times a day (12-08-20 @ 15:35)    Intake:   poor intake continues; 25% most meals, drinking some of supplement     Current Weight: Weight (kg): 74.9 (12/10); admission wt 67.8 kg (12/8)  % Weight Change:  9.5% wt gain x 2 days most likely due to fluid retention/recurrent ascites    Physical Appearance:  remains c 1+ b/l leg edema    Pertinent Medications: MEDICATIONS  (STANDING):  anastrozole 1 milliGRAM(s) Oral daily  apixaban 5 milliGRAM(s) Oral every 12 hours  furosemide    Tablet 40 milliGRAM(s) Oral daily  losartan 50 milliGRAM(s) Oral daily  metoprolol tartrate 25 milliGRAM(s) Oral two times a day  pantoprazole  Injectable 40 milliGRAM(s) IV Push daily  polyethylene glycol 3350 17 Gram(s) Oral daily  senna 2 Tablet(s) Oral at bedtime  spironolactone 50 milliGRAM(s) Oral daily  traMADol 25 milliGRAM(s) Oral three times a day    MEDICATIONS  (PRN):  acetaminophen   Tablet .. 650 milliGRAM(s) Oral every 6 hours PRN Mild Pain (1 - 3)  ondansetron Injectable 4 milliGRAM(s) IV Push every 6 hours PRN Nausea and/or Vomiting    Pertinent Labs: 12-10 Na138 mmol/L Glu 80 mg/dL K+ 3.2 mmol/L<L> Cr  0.62 mg/dL BUN 7 mg/dL 12-07 Alb 2.6 g/dL<L>      Skin:   WDL; surgical incision noted abdominal area    Estimated Needs:   [X ] no change since previous assessment  (12/8)  [ ] recalculated:     Previous Nutrition Diagnosis:   [X ] Severe Malnutrition - chronic  Etiology:  Inadequate energy/protein intake + increased energy/protein needs related to metastasized uterine Ca c ascites, SOB  Signs & Symptoms:  physical findings of severe muscle wasting & 15% wt loss x 3 months    GOAL:  Provide nutrition/hydration as medically appropriate & within pt's wishes for tx    Nutrition Diagnosis is [X ] ongoing  [ ] resolved [ ] not applicable     New Nutrition Diagnosis: [x ] not applicable      Interventions:   continue current diet rx as noted  Recommend  [ ] Change Diet To:  [ ] Nutrition Supplement  [ ] Nutrition Support  [ ] Other:     Monitoring and Evaluation:   [ X] PO intake [ x ] Tolerance to diet prescription [ x ] weights [ x ] labs[ x ] follow up per protocol  [ ] other:

## 2020-12-11 NOTE — PROCEDURE NOTE - ADDITIONAL PROCEDURE DETAILS
s/p therapeutic paracentesis for loculated ascitics .  RLQ- 600mL, LUQ - 750mL  both serous fluid.  Hemostasis achieved.  DSD applied.  No complications    -IR signed off, Outpatient paracentesis can be performed if needed
s/p diagnostic/therapeutic paracentesis for malignant ascites.  Approx 3400mL of serous ascitic fluid aspirated and sent to lab for analysis.  Hemostasis achieved.  DSD applied.    -f/u final ascitic fluid study results  -anticoagulants can be resumed tomorrow  -Consider indwelling peritoneal catheter placement if recurrent ascites

## 2020-12-11 NOTE — PROGRESS NOTE ADULT - SUBJECTIVE AND OBJECTIVE BOX
Patient is a 60y old  Female who presents with a chief complaint of malignant  ascites (10 Dec 2020 10:52)  patient continues to have LUQ pain. lipase , amylase noirmal- CT scn of abdomen showing loculated ascites in LUQ.-   IR contacted, patient is on eliquis.    INTERVAL HPI/OVERNIGHT EVENTS:  PAST MEDICAL & SURGICAL HISTORY:  Essential hypertension    Cancer of breast  Rt. Female    Sleep apnea  Use Oral Device    Obesity (BMI 30.0-34.9)    Seasonal allergies    Acid reflux    S/P hysterectomy    S/P breast reconstruction, bilateral    S/P bilateral mastectomy    Knee injury  Left &amp; had surgery about 30 years ago        MEDICATIONS  (STANDING):  anastrozole 1 milliGRAM(s) Oral daily  apixaban 5 milliGRAM(s) Oral every 12 hours  furosemide    Tablet 40 milliGRAM(s) Oral daily  losartan 50 milliGRAM(s) Oral daily  metoprolol tartrate 25 milliGRAM(s) Oral two times a day  pantoprazole  Injectable 40 milliGRAM(s) IV Push daily  polyethylene glycol 3350 17 Gram(s) Oral daily  senna 2 Tablet(s) Oral at bedtime  spironolactone 50 milliGRAM(s) Oral daily    MEDICATIONS  (PRN):  acetaminophen   Tablet .. 650 milliGRAM(s) Oral every 6 hours PRN Mild Pain (1 - 3)  morphine  - Injectable 2 milliGRAM(s) IV Push every 6 hours PRN Moderate Pain (4 - 6)  ondansetron Injectable 4 milliGRAM(s) IV Push every 6 hours PRN Nausea and/or Vomiting      Allergies    No Known Allergies    Intolerances        REVIEW OF SYSTEMS:  CONSTITUTIONAL: No fever, weight loss, or fatigue  EYES: No eye pain, visual disturbances, or discharge  ENMT:  No difficulty hearing, tinnitus, vertigo; No sinus or throat pain  NECK: No pain or stiffness  BREASTS: No pain, masses, or nipple discharge  RESPIRATORY: No cough, wheezing, chills or hemoptysis; No shortness of breath  CARDIOVASCULAR: No chest pain, palpitations, dizziness, or leg swelling  GASTROINTESTINAL: No abdominal or epigastric pain. No nausea, vomiting, or hematemesis; No diarrhea or constipation. No melena or hematochezia.  GENITOURINARY: No dysuria, frequency, hematuria, or incontinence  NEUROLOGICAL: No headaches, memory loss, loss of strength, numbness, or tremors  SKIN: No itching, burning, rashes, or lesions   LYMPH NODES: No enlarged glands  ENDOCRINE: No heat or cold intolerance; No hair loss  MUSCULOSKELETAL: No joint pain or swelling; No muscle, back, or extremity pain  PSYCHIATRIC: No depression, anxiety, mood swings, or difficulty sleeping  HEME/LYMPH: No easy bruising, or bleeding gums  ALLERY AND IMMUNOLOGIC: No hives or eczema    Vital Signs Last 24 Hrs  T(C): 37.1 (11 Dec 2020 05:59), Max: 37.1 (10 Dec 2020 17:27)  T(F): 98.7 (11 Dec 2020 05:59), Max: 98.7 (10 Dec 2020 17:27)  HR: 103 (11 Dec 2020 05:59) (93 - 110)  BP: 110/76 (11 Dec 2020 05:59) (99/67 - 110/76)  BP(mean): --  RR: 18 (11 Dec 2020 05:59) (18 - 18)  SpO2: 96% (11 Dec 2020 05:59) (94% - 97%)    PHYSICAL EXAM:  GENERAL: NAD, well-groomed, well-developed  HEAD:  Atraumatic, Normocephalic  EYES: EOMI, PERRLA, conjunctiva and sclera clear  ENMT: No tonsillar erythema, exudates, or enlargement; Moist mucous membranes, Good dentition, No lesions  NECK: Supple, No JVD, Normal thyroid  NERVOUS SYSTEM:  Alert & Oriented X3, Good concentration; Motor Strength 5/5 B/L upper and lower extremities; DTRs 2+ intact and symmetric  CHEST/LUNG: Clear to percussion bilaterally; No rales, rhonchi, wheezing, or rubs  HEART: Regular rate and rhythm; No murmurs, rubs, or gallops  ABDOMEN: Soft, Nontender, Nondistended; Bowel sounds present. tenderness LUQ.   EXTREMITIES:  2+ Peripheral Pulses, No clubbing, cyanosis, or edema  LYMPH: No lymphadenopathy noted  SKIN: No rashes or lesions    LABS:                        8.7    5.50  )-----------( 285      ( 11 Dec 2020 07:43 )             27.5     12-10    138  |  98  |  7   ----------------------------<  80  3.2<L>   |  32<H>  |  0.62    Ca    8.9      10 Dec 2020 11:02        PT/INR - ( 10 Dec 2020 06:38 )   PT: 17.9 sec;   INR: 1.58 ratio         PTT - ( 10 Dec 2020 06:38 )  PTT:34.3 sec    CAPILLARY BLOOD GLUCOSE                    RADIOLOGY & ADDITIONAL TESTS:    Imaging Personally Reviewed:  [ ] YES  [ ] NO    Consultant(s) Notes Reviewed:  [ ] YES  [ ] NO    Care Discussed with Consultants/Other Providers [ ] YES  [ ] NO    Care discussed with family,         [  ]   yes  [  ]  No    imp:    stable[ ]    unstable[  ]     improving [   ]       unchanged  [  ]                Plans:  Continue present plans  [x  ] IR  reconsult for paracentesis               New consult [  ]   specialty  .......               order test[  ]    test name.                  Discharge Planning  [  ]

## 2020-12-12 LAB
ANION GAP SERPL CALC-SCNC: 8 MMOL/L — SIGNIFICANT CHANGE UP (ref 5–17)
BUN SERPL-MCNC: 7 MG/DL — SIGNIFICANT CHANGE UP (ref 7–23)
CALCIUM SERPL-MCNC: 9 MG/DL — SIGNIFICANT CHANGE UP (ref 8.5–10.1)
CHLORIDE SERPL-SCNC: 93 MMOL/L — LOW (ref 96–108)
CO2 SERPL-SCNC: 34 MMOL/L — HIGH (ref 22–31)
CREAT SERPL-MCNC: 0.58 MG/DL — SIGNIFICANT CHANGE UP (ref 0.5–1.3)
CULTURE RESULTS: SIGNIFICANT CHANGE UP
GLUCOSE SERPL-MCNC: 87 MG/DL — SIGNIFICANT CHANGE UP (ref 70–99)
GRAM STN FLD: SIGNIFICANT CHANGE UP
HCT VFR BLD CALC: 28.5 % — LOW (ref 34.5–45)
HGB BLD-MCNC: 9.1 G/DL — LOW (ref 11.5–15.5)
MCHC RBC-ENTMCNC: 29.9 PG — SIGNIFICANT CHANGE UP (ref 27–34)
MCHC RBC-ENTMCNC: 31.9 GM/DL — LOW (ref 32–36)
MCV RBC AUTO: 93.8 FL — SIGNIFICANT CHANGE UP (ref 80–100)
NRBC # BLD: 0 /100 WBCS — SIGNIFICANT CHANGE UP (ref 0–0)
PLATELET # BLD AUTO: 332 K/UL — SIGNIFICANT CHANGE UP (ref 150–400)
POTASSIUM SERPL-MCNC: 3.1 MMOL/L — LOW (ref 3.5–5.3)
POTASSIUM SERPL-SCNC: 3.1 MMOL/L — LOW (ref 3.5–5.3)
RBC # BLD: 3.04 M/UL — LOW (ref 3.8–5.2)
RBC # FLD: 16.6 % — HIGH (ref 10.3–14.5)
SODIUM SERPL-SCNC: 135 MMOL/L — SIGNIFICANT CHANGE UP (ref 135–145)
SPECIMEN SOURCE: SIGNIFICANT CHANGE UP
WBC # BLD: 6.24 K/UL — SIGNIFICANT CHANGE UP (ref 3.8–10.5)
WBC # FLD AUTO: 6.24 K/UL — SIGNIFICANT CHANGE UP (ref 3.8–10.5)

## 2020-12-12 RX ORDER — POTASSIUM CHLORIDE 20 MEQ
40 PACKET (EA) ORAL ONCE
Refills: 0 | Status: COMPLETED | OUTPATIENT
Start: 2020-12-12 | End: 2020-12-12

## 2020-12-12 RX ADMIN — SENNA PLUS 2 TABLET(S): 8.6 TABLET ORAL at 21:31

## 2020-12-12 RX ADMIN — TRAMADOL HYDROCHLORIDE 25 MILLIGRAM(S): 50 TABLET ORAL at 14:05

## 2020-12-12 RX ADMIN — PANTOPRAZOLE SODIUM 40 MILLIGRAM(S): 20 TABLET, DELAYED RELEASE ORAL at 11:48

## 2020-12-12 RX ADMIN — LOSARTAN POTASSIUM 50 MILLIGRAM(S): 100 TABLET, FILM COATED ORAL at 06:09

## 2020-12-12 RX ADMIN — TRAMADOL HYDROCHLORIDE 25 MILLIGRAM(S): 50 TABLET ORAL at 22:00

## 2020-12-12 RX ADMIN — TRAMADOL HYDROCHLORIDE 25 MILLIGRAM(S): 50 TABLET ORAL at 15:36

## 2020-12-12 RX ADMIN — Medication 25 MILLIGRAM(S): at 06:08

## 2020-12-12 RX ADMIN — Medication 40 MILLIGRAM(S): at 06:08

## 2020-12-12 RX ADMIN — Medication 25 MILLIGRAM(S): at 18:20

## 2020-12-12 RX ADMIN — SPIRONOLACTONE 50 MILLIGRAM(S): 25 TABLET, FILM COATED ORAL at 06:09

## 2020-12-12 RX ADMIN — TRAMADOL HYDROCHLORIDE 25 MILLIGRAM(S): 50 TABLET ORAL at 06:09

## 2020-12-12 RX ADMIN — APIXABAN 5 MILLIGRAM(S): 2.5 TABLET, FILM COATED ORAL at 18:20

## 2020-12-12 RX ADMIN — APIXABAN 5 MILLIGRAM(S): 2.5 TABLET, FILM COATED ORAL at 06:09

## 2020-12-12 RX ADMIN — TRAMADOL HYDROCHLORIDE 25 MILLIGRAM(S): 50 TABLET ORAL at 21:31

## 2020-12-12 RX ADMIN — Medication 40 MILLIEQUIVALENT(S): at 14:04

## 2020-12-12 RX ADMIN — ANASTROZOLE 1 MILLIGRAM(S): 1 TABLET ORAL at 11:48

## 2020-12-12 RX ADMIN — TRAMADOL HYDROCHLORIDE 25 MILLIGRAM(S): 50 TABLET ORAL at 07:00

## 2020-12-12 NOTE — PROGRESS NOTE ADULT - SUBJECTIVE AND OBJECTIVE BOX
Patient is a 60y old  Female who presents with a chief complaint of malignant  ascites (12 Dec 2020 10:16)  less abdominal pain after paracentesis yesterday.    Hypokalemic.    functionally weak    INTERVAL HPI/OVERNIGHT EVENTS:  PAST MEDICAL & SURGICAL HISTORY:  Essential hypertension    Cancer of breast  Rt. Female    Sleep apnea  Use Oral Device    Obesity (BMI 30.0-34.9)    Seasonal allergies    Acid reflux    S/P hysterectomy    S/P breast reconstruction, bilateral    S/P bilateral mastectomy    Knee injury  Left &amp; had surgery about 30 years ago        MEDICATIONS  (STANDING):  anastrozole 1 milliGRAM(s) Oral daily  apixaban 5 milliGRAM(s) Oral every 12 hours  furosemide    Tablet 40 milliGRAM(s) Oral daily  losartan 50 milliGRAM(s) Oral daily  metoprolol tartrate 25 milliGRAM(s) Oral two times a day  pantoprazole  Injectable 40 milliGRAM(s) IV Push daily  polyethylene glycol 3350 17 Gram(s) Oral daily  potassium chloride    Tablet ER 40 milliEquivalent(s) Oral once  senna 2 Tablet(s) Oral at bedtime  spironolactone 50 milliGRAM(s) Oral daily  traMADol 25 milliGRAM(s) Oral three times a day    MEDICATIONS  (PRN):  acetaminophen   Tablet .. 650 milliGRAM(s) Oral every 6 hours PRN Mild Pain (1 - 3)  HYDROmorphone  Injectable 2 milliGRAM(s) IV Push every 3 hours PRN Severe Pain (7 - 10)  ondansetron Injectable 4 milliGRAM(s) IV Push every 6 hours PRN Nausea and/or Vomiting      Allergies    No Known Allergies    Intolerances        REVIEW OF SYSTEMS:  CONSTITUTIONAL: No fever, weight loss, or fatigue  EYES: No eye pain, visual disturbances, or discharge  ENMT:  No difficulty hearing, tinnitus, vertigo; No sinus or throat pain  NECK: No pain or stiffness  BREASTS: No pain, masses, or nipple discharge  RESPIRATORY: No cough, wheezing, chills or hemoptysis; No shortness of breath  CARDIOVASCULAR: No chest pain, palpitations, dizziness, or leg swelling  GASTROINTESTINAL: No abdominal or epigastric pain. No nausea, vomiting, or hematemesis; No diarrhea or constipation. No melena or hematochezia.  GENITOURINARY: No dysuria, frequency, hematuria, or incontinence  NEUROLOGICAL: No headaches, memory loss, loss of strength, numbness, or tremors  SKIN: No itching, burning, rashes, or lesions   LYMPH NODES: No enlarged glands  ENDOCRINE: No heat or cold intolerance; No hair loss  MUSCULOSKELETAL: No joint pain or swelling; No muscle, back, or extremity pain  PSYCHIATRIC: No depression, anxiety, mood swings, or difficulty sleeping  HEME/LYMPH: No easy bruising, or bleeding gums  ALLERY AND IMMUNOLOGIC: No hives or eczema    Vital Signs Last 24 Hrs  T(C): 36.9 (12 Dec 2020 11:21), Max: 37.2 (11 Dec 2020 17:13)  T(F): 98.4 (12 Dec 2020 11:21), Max: 99 (11 Dec 2020 17:13)  HR: 97 (12 Dec 2020 11:21) (75 - 103)  BP: 91/65 (12 Dec 2020 11:21) (91/65 - 121/80)  BP(mean): --  RR: 18 (12 Dec 2020 11:21) (17 - 18)  SpO2: 96% (12 Dec 2020 11:21) (95% - 97%)    PHYSICAL EXAM:  GENERAL: NAD, well-groomed, well-developed  HEAD:  Atraumatic, Normocephalic  EYES: EOMI, PERRLA, conjunctiva and sclera clear  ENMT: No tonsillar erythema, exudates, or enlargement; Moist mucous membranes, Good dentition, No lesions  NECK: Supple, No JVD, Normal thyroid  NERVOUS SYSTEM:  Alert & Oriented X3, Good concentration; Motor Strength 5/5 B/L upper and lower extremities; DTRs 2+ intact and symmetric  CHEST/LUNG: Clear to percussion bilaterally; No rales, rhonchi, wheezing, or rubs  HEART: Regular rate and rhythm; No murmurs, rubs, or gallops  ABDOMEN: Soft, Nontender, Nondistended; Bowel sounds present  EXTREMITIES:  2+ Peripheral Pulses, No clubbing, cyanosis, or edema  LYMPH: No lymphadenopathy noted  SKIN: No rashes or lesions    LABS:                        9.1    6.24  )-----------( 332      ( 12 Dec 2020 07:04 )             28.5     12-12    135  |  93<L>  |  7   ----------------------------<  87  3.1<L>   |  34<H>  |  0.58    Ca    9.0      12 Dec 2020 07:04            CAPILLARY BLOOD GLUCOSE                    RADIOLOGY & ADDITIONAL TESTS:    Imaging Personally Reviewed:  [ ] YES  [ ] NO    Consultant(s) Notes Reviewed:  [ ] YES  [ ] NO    Care Discussed with Consultants/Other Providers [ ] YES  [ ] NO    Care discussed with family,         [  ]   yes  [  ]  No    imp:    stable[x ]    unstable[  ]     improving [   ]       unchanged  [  ]                Plans:  Continue present plans  [x  ] potassium supplementation               New consult [  ]   specialty  ..Palliative care.....               order test[  ]    test name.                  Discharge Planning  [  ]

## 2020-12-12 NOTE — PROGRESS NOTE ADULT - SUBJECTIVE AND OBJECTIVE BOX
lying in bed, feeling weak +    Vital Signs Last 24 Hrs  T(C): 36.8 (12 Dec 2020 04:55), Max: 37.2 (11 Dec 2020 17:13)  T(F): 98.3 (12 Dec 2020 04:55), Max: 99 (11 Dec 2020 17:13)  HR: 95 (12 Dec 2020 04:55) (75 - 103)  BP: 121/80 (12 Dec 2020 04:55) (105/69 - 121/80)  BP(mean): --  RR: 18 (12 Dec 2020 04:55) (17 - 18)  SpO2: 95% (12 Dec 2020 04:55) (95% - 97%)    PHYSICAL EXAM:    general - AAO x 3  HEENT - No Icterus  CVS - RRR  RS - AE B/L  Abd - soft, distended +  Ext - Pulses +        LABS:                        9.1    6.24  )-----------( 332      ( 12 Dec 2020 07:04 )             28.5     12-12    135  |  93<L>  |  7   ----------------------------<  87  3.1<L>   |  34<H>  |  0.58    Ca    9.0      12 Dec 2020 07:04            Culture - Body Fluid with Gram Stain (collected 07 Dec 2020 15:20)  Source: Ascites Fl ascites s/p paracentesis  Gram Stain (prelim) (08 Dec 2020 12:26):    polymorphonuclear leukocytes seen    No organisms seen    by cytocentrifuge  Preliminary Report (08 Dec 2020 12:26):    No growth        RADIOLOGY & ADDITIONAL STUDIES:

## 2020-12-12 NOTE — PROGRESS NOTE ADULT - PROBLEM SELECTOR PLAN 1
- Patient s/p repeat paracentesis  - still feeling weak, repeat PT evaluation  - may benefit from rehab  - Palliative care evaluation  - discussed options of hospice with patient but she wants to think about it

## 2020-12-13 RX ADMIN — TRAMADOL HYDROCHLORIDE 25 MILLIGRAM(S): 50 TABLET ORAL at 22:26

## 2020-12-13 RX ADMIN — HYDROMORPHONE HYDROCHLORIDE 2 MILLIGRAM(S): 2 INJECTION INTRAMUSCULAR; INTRAVENOUS; SUBCUTANEOUS at 14:45

## 2020-12-13 RX ADMIN — SPIRONOLACTONE 50 MILLIGRAM(S): 25 TABLET, FILM COATED ORAL at 06:08

## 2020-12-13 RX ADMIN — APIXABAN 5 MILLIGRAM(S): 2.5 TABLET, FILM COATED ORAL at 21:25

## 2020-12-13 RX ADMIN — HYDROMORPHONE HYDROCHLORIDE 2 MILLIGRAM(S): 2 INJECTION INTRAMUSCULAR; INTRAVENOUS; SUBCUTANEOUS at 00:30

## 2020-12-13 RX ADMIN — ANASTROZOLE 1 MILLIGRAM(S): 1 TABLET ORAL at 12:18

## 2020-12-13 RX ADMIN — TRAMADOL HYDROCHLORIDE 25 MILLIGRAM(S): 50 TABLET ORAL at 06:40

## 2020-12-13 RX ADMIN — TRAMADOL HYDROCHLORIDE 25 MILLIGRAM(S): 50 TABLET ORAL at 21:25

## 2020-12-13 RX ADMIN — TRAMADOL HYDROCHLORIDE 25 MILLIGRAM(S): 50 TABLET ORAL at 06:07

## 2020-12-13 RX ADMIN — Medication 10 MILLIGRAM(S): at 00:06

## 2020-12-13 RX ADMIN — ONDANSETRON 4 MILLIGRAM(S): 8 TABLET, FILM COATED ORAL at 15:37

## 2020-12-13 RX ADMIN — SENNA PLUS 2 TABLET(S): 8.6 TABLET ORAL at 21:25

## 2020-12-13 RX ADMIN — APIXABAN 5 MILLIGRAM(S): 2.5 TABLET, FILM COATED ORAL at 06:09

## 2020-12-13 RX ADMIN — Medication 40 MILLIGRAM(S): at 06:08

## 2020-12-13 RX ADMIN — HYDROMORPHONE HYDROCHLORIDE 2 MILLIGRAM(S): 2 INJECTION INTRAMUSCULAR; INTRAVENOUS; SUBCUTANEOUS at 00:06

## 2020-12-13 RX ADMIN — HYDROMORPHONE HYDROCHLORIDE 2 MILLIGRAM(S): 2 INJECTION INTRAMUSCULAR; INTRAVENOUS; SUBCUTANEOUS at 14:16

## 2020-12-13 RX ADMIN — LOSARTAN POTASSIUM 50 MILLIGRAM(S): 100 TABLET, FILM COATED ORAL at 06:08

## 2020-12-13 RX ADMIN — Medication 25 MILLIGRAM(S): at 06:09

## 2020-12-13 RX ADMIN — PANTOPRAZOLE SODIUM 40 MILLIGRAM(S): 20 TABLET, DELAYED RELEASE ORAL at 12:22

## 2020-12-13 RX ADMIN — ONDANSETRON 4 MILLIGRAM(S): 8 TABLET, FILM COATED ORAL at 06:15

## 2020-12-13 NOTE — PROGRESS NOTE ADULT - SUBJECTIVE AND OBJECTIVE BOX
Patient is a 60y old  Female who presents with a chief complaint of malignant  ascites (13 Dec 2020 10:36)  functionally weak.    For Palliative care consult.    INTERVAL HPI/OVERNIGHT EVENTS:  PAST MEDICAL & SURGICAL HISTORY:  Essential hypertension    Cancer of breast  Rt. Female    Sleep apnea  Use Oral Device    Obesity (BMI 30.0-34.9)    Seasonal allergies    Acid reflux    S/P hysterectomy    S/P breast reconstruction, bilateral    S/P bilateral mastectomy    Knee injury  Left &amp; had surgery about 30 years ago        MEDICATIONS  (STANDING):  anastrozole 1 milliGRAM(s) Oral daily  apixaban 5 milliGRAM(s) Oral every 12 hours  furosemide    Tablet 40 milliGRAM(s) Oral daily  losartan 50 milliGRAM(s) Oral daily  metoprolol tartrate 25 milliGRAM(s) Oral two times a day  pantoprazole  Injectable 40 milliGRAM(s) IV Push daily  polyethylene glycol 3350 17 Gram(s) Oral daily  senna 2 Tablet(s) Oral at bedtime  spironolactone 50 milliGRAM(s) Oral daily  traMADol 25 milliGRAM(s) Oral three times a day    MEDICATIONS  (PRN):  acetaminophen   Tablet .. 650 milliGRAM(s) Oral every 6 hours PRN Mild Pain (1 - 3)  HYDROmorphone  Injectable 2 milliGRAM(s) IV Push every 3 hours PRN Severe Pain (7 - 10)  ondansetron Injectable 4 milliGRAM(s) IV Push every 6 hours PRN Nausea and/or Vomiting      Allergies    No Known Allergies    Intolerances        REVIEW OF SYSTEMS:  CONSTITUTIONAL: No fever, weight loss, or fatigue  EYES: No eye pain, visual disturbances, or discharge  ENMT:  No difficulty hearing, tinnitus, vertigo; No sinus or throat pain  NECK: No pain or stiffness  BREASTS: No pain, masses, or nipple discharge  RESPIRATORY: No cough, wheezing, chills or hemoptysis; No shortness of breath  CARDIOVASCULAR: No chest pain, palpitations, dizziness, or leg swelling  GASTROINTESTINAL: No abdominal or epigastric pain. No nausea, vomiting, or hematemesis; No diarrhea or constipation. No melena or hematochezia.  GENITOURINARY: No dysuria, frequency, hematuria, or incontinence  NEUROLOGICAL: No headaches, memory loss, loss of strength, numbness, or tremors  SKIN: No itching, burning, rashes, or lesions   LYMPH NODES: No enlarged glands  ENDOCRINE: No heat or cold intolerance; No hair loss  MUSCULOSKELETAL: No joint pain or swelling; No muscle, back, or extremity pain  PSYCHIATRIC: No depression, anxiety, mood swings, or difficulty sleeping  HEME/LYMPH: No easy bruising, or bleeding gums  ALLERY AND IMMUNOLOGIC: No hives or eczema    Vital Signs Last 24 Hrs  T(C): 36.3 (13 Dec 2020 11:37), Max: 36.8 (12 Dec 2020 18:15)  T(F): 97.4 (13 Dec 2020 11:37), Max: 98.3 (12 Dec 2020 18:15)  HR: 86 (13 Dec 2020 11:37) (86 - 102)  BP: 95/68 (13 Dec 2020 11:37) (90/64 - 116/78)  BP(mean): --  RR: 17 (13 Dec 2020 11:37) (17 - 18)  SpO2: 96% (13 Dec 2020 11:37) (94% - 97%)    PHYSICAL EXAM:  GENERAL: NAD, well-groomed, well-developed  HEAD:  Atraumatic, Normocephalic  EYES: EOMI, PERRLA, conjunctiva and sclera clear  ENMT: No tonsillar erythema, exudates, or enlargement; Moist mucous membranes, Good dentition, No lesions  NECK: Supple, No JVD, Normal thyroid  NERVOUS SYSTEM:  Alert & Oriented X3, Good concentration; Motor Strength 5/5 B/L upper and lower extremities; DTRs 2+ intact and symmetric  CHEST/LUNG: Clear to percussion bilaterally; No rales, rhonchi, wheezing, or rubs  HEART: Regular rate and rhythm; No murmurs, rubs, or gallops  ABDOMEN: Soft, Nontender, Nondistended; Bowel sounds present  EXTREMITIES:  2+ Peripheral Pulses, No clubbing, cyanosis, or edema  LYMPH: No lymphadenopathy noted  SKIN: No rashes or lesions    LABS:                        9.1    6.24  )-----------( 332      ( 12 Dec 2020 07:04 )             28.5     12-12    135  |  93<L>  |  7   ----------------------------<  87  3.1<L>   |  34<H>  |  0.58    Ca    9.0      12 Dec 2020 07:04            CAPILLARY BLOOD GLUCOSE                    RADIOLOGY & ADDITIONAL TESTS:    Imaging Personally Reviewed:  [ ] YES  [ ] NO    Consultant(s) Notes Reviewed:  [ ] YES  [ ] NO    Care Discussed with Consultants/Other Providers [ ] YES  [ ] NO    Care discussed with family,         [  ]   yes  [  ]  No    imp:    stable[ ]    unstable[  ]     improving [   ]       unchanged  [x  ]                Plans:  Continue present plans  [x  ]               New consult [  ]   specialty  ..Palliative Care.....               order test[  ]    test name.  labs in AM                 Discharge Planning  [  ]

## 2020-12-13 NOTE — PROGRESS NOTE ADULT - SUBJECTIVE AND OBJECTIVE BOX
lying in bed, weak looking +    Vital Signs Last 24 Hrs  T(C): 36.3 (13 Dec 2020 05:49), Max: 36.9 (12 Dec 2020 11:21)  T(F): 97.4 (13 Dec 2020 05:49), Max: 98.4 (12 Dec 2020 11:21)  HR: 90 (13 Dec 2020 05:49) (90 - 102)  BP: 116/78 (13 Dec 2020 05:49) (90/64 - 116/78)  BP(mean): --  RR: 18 (13 Dec 2020 05:49) (17 - 18)  SpO2: 94% (13 Dec 2020 05:49) (94% - 97%)    PHYSICAL EXAM:    general - AAO x 3  HEENT - No Icterus  CVS - RRR  RS - AE B/L  Abd - soft, distended +  Ext - Pulses +        LABS:                        9.1    6.24  )-----------( 332      ( 12 Dec 2020 07:04 )             28.5     12-12    135  |  93<L>  |  7   ----------------------------<  87  3.1<L>   |  34<H>  |  0.58    Ca    9.0      12 Dec 2020 07:04            Culture - Body Fluid with Gram Stain (collected 07 Dec 2020 15:20)  Source: Ascites Fl ascites s/p paracentesis  Gram Stain (12 Dec 2020 10:49):    polymorphonuclear leukocytes seen    No organisms seen    by cytocentrifuge  Final Report (12 Dec 2020 10:49):    No growth at 5 days        RADIOLOGY & ADDITIONAL STUDIES:

## 2020-12-14 LAB
ANION GAP SERPL CALC-SCNC: 7 MMOL/L — SIGNIFICANT CHANGE UP (ref 5–17)
BUN SERPL-MCNC: 9 MG/DL — SIGNIFICANT CHANGE UP (ref 7–23)
CALCIUM SERPL-MCNC: 8.8 MG/DL — SIGNIFICANT CHANGE UP (ref 8.5–10.1)
CHLORIDE SERPL-SCNC: 94 MMOL/L — LOW (ref 96–108)
CO2 SERPL-SCNC: 33 MMOL/L — HIGH (ref 22–31)
CREAT SERPL-MCNC: 0.75 MG/DL — SIGNIFICANT CHANGE UP (ref 0.5–1.3)
GLUCOSE BLDC GLUCOMTR-MCNC: 96 MG/DL — SIGNIFICANT CHANGE UP (ref 70–99)
GLUCOSE SERPL-MCNC: 91 MG/DL — SIGNIFICANT CHANGE UP (ref 70–99)
HCT VFR BLD CALC: 25.2 % — LOW (ref 34.5–45)
HGB BLD-MCNC: 7.8 G/DL — LOW (ref 11.5–15.5)
MCHC RBC-ENTMCNC: 29.3 PG — SIGNIFICANT CHANGE UP (ref 27–34)
MCHC RBC-ENTMCNC: 31 GM/DL — LOW (ref 32–36)
MCV RBC AUTO: 94.7 FL — SIGNIFICANT CHANGE UP (ref 80–100)
NRBC # BLD: 0 /100 WBCS — SIGNIFICANT CHANGE UP (ref 0–0)
PLATELET # BLD AUTO: 348 K/UL — SIGNIFICANT CHANGE UP (ref 150–400)
POTASSIUM SERPL-MCNC: 3.4 MMOL/L — LOW (ref 3.5–5.3)
POTASSIUM SERPL-SCNC: 3.4 MMOL/L — LOW (ref 3.5–5.3)
PROT SERPL-MCNC: 5.7 G/DL — LOW (ref 6–8.3)
PROT SERPL-MCNC: 5.7 G/DL — LOW (ref 6–8.3)
RBC # BLD: 2.66 M/UL — LOW (ref 3.8–5.2)
RBC # FLD: 16.7 % — HIGH (ref 10.3–14.5)
SODIUM SERPL-SCNC: 134 MMOL/L — LOW (ref 135–145)
WBC # BLD: 5.49 K/UL — SIGNIFICANT CHANGE UP (ref 3.8–10.5)
WBC # FLD AUTO: 5.49 K/UL — SIGNIFICANT CHANGE UP (ref 3.8–10.5)

## 2020-12-14 RX ADMIN — SPIRONOLACTONE 50 MILLIGRAM(S): 25 TABLET, FILM COATED ORAL at 06:05

## 2020-12-14 RX ADMIN — APIXABAN 5 MILLIGRAM(S): 2.5 TABLET, FILM COATED ORAL at 18:19

## 2020-12-14 RX ADMIN — Medication 25 MILLIGRAM(S): at 18:19

## 2020-12-14 RX ADMIN — POLYETHYLENE GLYCOL 3350 17 GRAM(S): 17 POWDER, FOR SOLUTION ORAL at 12:47

## 2020-12-14 RX ADMIN — TRAMADOL HYDROCHLORIDE 25 MILLIGRAM(S): 50 TABLET ORAL at 06:04

## 2020-12-14 RX ADMIN — TRAMADOL HYDROCHLORIDE 25 MILLIGRAM(S): 50 TABLET ORAL at 21:47

## 2020-12-14 RX ADMIN — Medication 40 MILLIGRAM(S): at 06:05

## 2020-12-14 RX ADMIN — TRAMADOL HYDROCHLORIDE 25 MILLIGRAM(S): 50 TABLET ORAL at 15:51

## 2020-12-14 RX ADMIN — SENNA PLUS 2 TABLET(S): 8.6 TABLET ORAL at 21:47

## 2020-12-14 RX ADMIN — TRAMADOL HYDROCHLORIDE 25 MILLIGRAM(S): 50 TABLET ORAL at 22:47

## 2020-12-14 RX ADMIN — LOSARTAN POTASSIUM 50 MILLIGRAM(S): 100 TABLET, FILM COATED ORAL at 06:05

## 2020-12-14 RX ADMIN — APIXABAN 5 MILLIGRAM(S): 2.5 TABLET, FILM COATED ORAL at 06:05

## 2020-12-14 RX ADMIN — ANASTROZOLE 1 MILLIGRAM(S): 1 TABLET ORAL at 12:49

## 2020-12-14 RX ADMIN — PANTOPRAZOLE SODIUM 40 MILLIGRAM(S): 20 TABLET, DELAYED RELEASE ORAL at 12:47

## 2020-12-14 RX ADMIN — Medication 25 MILLIGRAM(S): at 06:05

## 2020-12-14 RX ADMIN — TRAMADOL HYDROCHLORIDE 25 MILLIGRAM(S): 50 TABLET ORAL at 14:52

## 2020-12-14 NOTE — PROGRESS NOTE ADULT - SUBJECTIVE AND OBJECTIVE BOX
Patient is a 60y old  Female who presents with a chief complaint of malignant  ascites (14 Dec 2020 10:39)  improved, still weak   for palliative care consult.   going home vsrehab.    INTERVAL HPI/OVERNIGHT EVENTS:  PAST MEDICAL & SURGICAL HISTORY:  Essential hypertension    Cancer of breast  Rt. Female    Sleep apnea  Use Oral Device    Obesity (BMI 30.0-34.9)    Seasonal allergies    Acid reflux    S/P hysterectomy    S/P breast reconstruction, bilateral    S/P bilateral mastectomy    Knee injury  Left &amp; had surgery about 30 years ago        MEDICATIONS  (STANDING):  anastrozole 1 milliGRAM(s) Oral daily  apixaban 5 milliGRAM(s) Oral every 12 hours  furosemide    Tablet 40 milliGRAM(s) Oral daily  losartan 50 milliGRAM(s) Oral daily  metoprolol tartrate 25 milliGRAM(s) Oral two times a day  pantoprazole  Injectable 40 milliGRAM(s) IV Push daily  polyethylene glycol 3350 17 Gram(s) Oral daily  senna 2 Tablet(s) Oral at bedtime  spironolactone 50 milliGRAM(s) Oral daily  traMADol 25 milliGRAM(s) Oral three times a day    MEDICATIONS  (PRN):  acetaminophen   Tablet .. 650 milliGRAM(s) Oral every 6 hours PRN Mild Pain (1 - 3)  HYDROmorphone  Injectable 2 milliGRAM(s) IV Push every 3 hours PRN Severe Pain (7 - 10)  ondansetron Injectable 4 milliGRAM(s) IV Push every 6 hours PRN Nausea and/or Vomiting      Allergies    No Known Allergies    Intolerances        REVIEW OF SYSTEMS:  CONSTITUTIONAL: No fever, weight loss, or fatigue  EYES: No eye pain, visual disturbances, or discharge  ENMT:  No difficulty hearing, tinnitus, vertigo; No sinus or throat pain  NECK: No pain or stiffness  BREASTS: No pain, masses, or nipple discharge  RESPIRATORY: No cough, wheezing, chills or hemoptysis; No shortness of breath  CARDIOVASCULAR: No chest pain, palpitations, dizziness, or leg swelling  GASTROINTESTINAL: No abdominal or epigastric pain. No nausea, vomiting, or hematemesis; No diarrhea or constipation. No melena or hematochezia.  GENITOURINARY: No dysuria, frequency, hematuria, or incontinence  NEUROLOGICAL: No headaches, memory loss, loss of strength, numbness, or tremors  SKIN: No itching, burning, rashes, or lesions   LYMPH NODES: No enlarged glands  ENDOCRINE: No heat or cold intolerance; No hair loss  MUSCULOSKELETAL: No joint pain or swelling; No muscle, back, or extremity pain  PSYCHIATRIC: No depression, anxiety, mood swings, or difficulty sleeping  HEME/LYMPH: No easy bruising, or bleeding gums  ALLERY AND IMMUNOLOGIC: No hives or eczema    Vital Signs Last 24 Hrs  T(C): 36.9 (14 Dec 2020 04:50), Max: 37.1 (13 Dec 2020 23:15)  T(F): 98.4 (14 Dec 2020 04:50), Max: 98.8 (13 Dec 2020 23:15)  HR: 98 (14 Dec 2020 04:50) (85 - 99)  BP: 113/75 (14 Dec 2020 04:50) (95/68 - 113/75)  BP(mean): --  RR: 17 (14 Dec 2020 04:50) (17 - 17)  SpO2: 98% (14 Dec 2020 04:50) (96% - 99%)    PHYSICAL EXAM:  GENERAL: NAD, well-groomed, well-developed  HEAD:  Atraumatic, Normocephalic  EYES: EOMI, PERRLA, conjunctiva and sclera clear  ENMT: No tonsillar erythema, exudates, or enlargement; Moist mucous membranes, Good dentition, No lesions  NECK: Supple, No JVD, Normal thyroid  NERVOUS SYSTEM:  Alert & Oriented X3, Good concentration; Motor Strength 5/5 B/L upper and lower extremities; DTRs 2+ intact and symmetric  CHEST/LUNG: Clear to percussion bilaterally; No rales, rhonchi, wheezing, or rubs  HEART: Regular rate and rhythm; No murmurs, rubs, or gallops  ABDOMEN: Soft, Nontender, Nondistended; Bowel sounds present. ascites present  EXTREMITIES:  2+ Peripheral Pulses, No clubbing, cyanosis, or edema  LYMPH: No lymphadenopathy noted  SKIN: No rashes or lesions    LABS:                        7.8    5.49  )-----------( 348      ( 14 Dec 2020 07:52 )             25.2     12-14    134<L>  |  94<L>  |  9   ----------------------------<  91  3.4<L>   |  33<H>  |  0.75    Ca    8.8      14 Dec 2020 07:52            CAPILLARY BLOOD GLUCOSE                    RADIOLOGY & ADDITIONAL TESTS:    Imaging Personally Reviewed:  [ ] YES  [ ] NO    Consultant(s) Notes Reviewed:  [ ] YES  [ ] NO    Care Discussed with Consultants/Other Providers [ ] YES  [ ] NO    Care discussed with family,         [  ]   yes  [  ]  No    imp:    stable[ xx]    unstable[  ]     improving [   ]       unchanged  [  x]                Plans:  Continue present plans  [  x] for palliative care               New consult [  ]   specialty  .......Palliative care               order test[  ]    test name.                  Discharge Planning  [  ]

## 2020-12-14 NOTE — PROGRESS NOTE ADULT - SUBJECTIVE AND OBJECTIVE BOX
lying in bed, feels better today    Vital Signs Last 24 Hrs  T(C): 36.9 (14 Dec 2020 04:50), Max: 37.1 (13 Dec 2020 23:15)  T(F): 98.4 (14 Dec 2020 04:50), Max: 98.8 (13 Dec 2020 23:15)  HR: 98 (14 Dec 2020 04:50) (85 - 99)  BP: 113/75 (14 Dec 2020 04:50) (95/68 - 113/75)  BP(mean): --  RR: 17 (14 Dec 2020 04:50) (17 - 17)  SpO2: 98% (14 Dec 2020 04:50) (96% - 99%)    PHYSICAL EXAM:    general - AAO x 3  HEENT - No Icterus  CVS - RRR  RS - AE B/L  Abd - soft, NT  Ext - Pulses +        LABS:                        7.8    5.49  )-----------( 348      ( 14 Dec 2020 07:52 )             25.2     12-14    134<L>  |  94<L>  |  9   ----------------------------<  91  3.4<L>   |  33<H>  |  0.75    Ca    8.8      14 Dec 2020 07:52            Culture - Body Fluid with Gram Stain (collected 07 Dec 2020 15:20)  Source: Ascites Fl ascites s/p paracentesis  Gram Stain (12 Dec 2020 10:49):    polymorphonuclear leukocytes seen    No organisms seen    by cytocentrifuge  Final Report (12 Dec 2020 10:49):    No growth at 5 days        RADIOLOGY & ADDITIONAL STUDIES:

## 2020-12-15 LAB
% ALBUMIN: 42 % — SIGNIFICANT CHANGE UP
% ALPHA 1: 10 % — SIGNIFICANT CHANGE UP
% ALPHA 2: 15.2 % — SIGNIFICANT CHANGE UP
% BETA: 15.3 % — SIGNIFICANT CHANGE UP
% GAMMA: 17.5 % — SIGNIFICANT CHANGE UP
ALBUMIN SERPL ELPH-MCNC: 2.4 G/DL — LOW (ref 3.6–5.5)
ALBUMIN/GLOB SERPL ELPH: 0.7 RATIO — SIGNIFICANT CHANGE UP
ALPHA1 GLOB SERPL ELPH-MCNC: 0.6 G/DL — HIGH (ref 0.1–0.4)
ALPHA2 GLOB SERPL ELPH-MCNC: 0.9 G/DL — SIGNIFICANT CHANGE UP (ref 0.5–1)
B-GLOBULIN SERPL ELPH-MCNC: 0.9 G/DL — SIGNIFICANT CHANGE UP (ref 0.5–1)
GAMMA GLOBULIN: 1 G/DL — SIGNIFICANT CHANGE UP (ref 0.6–1.6)
INTERPRETATION SERPL IFE-IMP: SIGNIFICANT CHANGE UP
PROT PATTERN SERPL ELPH-IMP: SIGNIFICANT CHANGE UP

## 2020-12-15 PROCEDURE — 99223 1ST HOSP IP/OBS HIGH 75: CPT

## 2020-12-15 RX ORDER — APIXABAN 2.5 MG/1
1 TABLET, FILM COATED ORAL
Qty: 60 | Refills: 0
Start: 2020-12-15 | End: 2021-01-13

## 2020-12-15 RX ORDER — MORPHINE SULFATE 50 MG/1
2 CAPSULE, EXTENDED RELEASE ORAL EVERY 4 HOURS
Refills: 0 | Status: DISCONTINUED | OUTPATIENT
Start: 2020-12-15 | End: 2020-12-16

## 2020-12-15 RX ORDER — TRAMADOL HYDROCHLORIDE 50 MG/1
50 TABLET ORAL EVERY 6 HOURS
Refills: 0 | Status: DISCONTINUED | OUTPATIENT
Start: 2020-12-15 | End: 2020-12-16

## 2020-12-15 RX ADMIN — APIXABAN 5 MILLIGRAM(S): 2.5 TABLET, FILM COATED ORAL at 06:02

## 2020-12-15 RX ADMIN — PANTOPRAZOLE SODIUM 40 MILLIGRAM(S): 20 TABLET, DELAYED RELEASE ORAL at 12:41

## 2020-12-15 RX ADMIN — TRAMADOL HYDROCHLORIDE 25 MILLIGRAM(S): 50 TABLET ORAL at 15:35

## 2020-12-15 RX ADMIN — ANASTROZOLE 1 MILLIGRAM(S): 1 TABLET ORAL at 12:41

## 2020-12-15 RX ADMIN — TRAMADOL HYDROCHLORIDE 25 MILLIGRAM(S): 50 TABLET ORAL at 06:02

## 2020-12-15 RX ADMIN — TRAMADOL HYDROCHLORIDE 25 MILLIGRAM(S): 50 TABLET ORAL at 14:38

## 2020-12-15 RX ADMIN — SPIRONOLACTONE 50 MILLIGRAM(S): 25 TABLET, FILM COATED ORAL at 06:02

## 2020-12-15 RX ADMIN — APIXABAN 5 MILLIGRAM(S): 2.5 TABLET, FILM COATED ORAL at 17:33

## 2020-12-15 RX ADMIN — TRAMADOL HYDROCHLORIDE 25 MILLIGRAM(S): 50 TABLET ORAL at 07:02

## 2020-12-15 RX ADMIN — TRAMADOL HYDROCHLORIDE 25 MILLIGRAM(S): 50 TABLET ORAL at 21:52

## 2020-12-15 RX ADMIN — SENNA PLUS 2 TABLET(S): 8.6 TABLET ORAL at 21:52

## 2020-12-15 RX ADMIN — Medication 25 MILLIGRAM(S): at 17:33

## 2020-12-15 RX ADMIN — TRAMADOL HYDROCHLORIDE 25 MILLIGRAM(S): 50 TABLET ORAL at 22:52

## 2020-12-15 RX ADMIN — Medication 40 MILLIGRAM(S): at 06:02

## 2020-12-15 NOTE — PROGRESS NOTE ADULT - CONVERSATION DETAILS
In addition to the EM visit, an advance care planning meeting was performed  Start time: 11:00 am  End time: 11:16 am  Total time: 16 min   A face to face meeting to discuss advance care planning was held today regarding: ERLIN MG  Primary decision maker: Patient   Alternate/surrogate: Patrice Mg (spouse) & Maribel Mg (daughter)  Discussed advance directives including, but not limited to, healthcare proxy and code status.  Decision regarding code status: FULL CODE   Documentation completed today: HCP      16 min face to face conversation held at patients bedside to discuss advanced directives and code status. Patient is  and has 1 daughter, patients  travels a lot and many times is not present so she would like to appoint her daughter as her HCP. Form will be obtained this afternoon and placed in patients chart. Diagnosis, prognosis, and code status discussed in detail with the patient at this time she states that she would like to remain a full code and would "like to be brought back if her heart stopped" explained to patient that this could lead to intubation with the possibility of staying on mechanical ventilation, patient states this is something she would want and "if I do not get better than my daughter could decide". Patient remains FULL CODE. In addition to the EM visit, an advance care planning meeting was performed  Start time: 11:00 am  End time: 11:16 am  Total time: 16 min   A face to face meeting to discuss advance care planning was held today regarding: ERLIN MG  Primary decision maker: Patient   Alternate/surrogate: Patrice Martinezogun (spouse) & Maribel Mg (daughter)  Discussed advance directives including, but not limited to, healthcare proxy and code status.  Decision regarding code status: FULL CODE   Documentation completed today: HCP      16 min face to face conversation held at patients bedside to discuss advanced directives and code status. Patient is  and has 1 daughter, patients  travels a lot and many times is not present so she would like to appoint her daughter as her HCP. Form in patients chart. Diagnosis, prognosis, and code status discussed in detail with the patient at this time she states that she would like to remain a full code and would "like to be brought back if her heart stopped" explained to patient that this could lead to intubation with the possibility of staying on mechanical ventilation, patient states this is something she would want and "if I do not get better than my daughter could decide". Patient remains FULL CODE.

## 2020-12-15 NOTE — PROGRESS NOTE ADULT - PROBLEM SELECTOR PLAN 3
Patient report having no nausea/vomiting in the last 24 hours. If patient reports nausea/vomiting would recommend patient receives Zofran 4mg IVP q6h PRN.

## 2020-12-15 NOTE — PROGRESS NOTE ADULT - PROBLEM SELECTOR PLAN 8
Palliative care consulted for complex decision making related to goals of care and pain/symptom management. Patient remains a full code and remains hopeful that she will get stronger to be able to receive DMT. We will continue to follow for ongoing goc and symptom management. Emotional support provided.

## 2020-12-15 NOTE — PROGRESS NOTE ADULT - ASSESSMENT
59 y/o F pmhx HTN, uterine cancer diagnosed sept 2020, s/p chemo and radiation, now with mets to the stomach and liver, pt was sent by her oncologist (Dr Vital) for paracentesis for large volume ascites noted on ct scan that was done on Monday.

## 2020-12-15 NOTE — PROGRESS NOTE ADULT - NUTRITIONAL ASSESSMENT
This patient has been assessed with a concern for Malnutrition and has been determined to have a diagnosis/diagnoses of Severe protein-calorie malnutrition.    This patient is being managed with:   Diet DASH/TLC-  Sodium & Cholesterol Restricted  Entered: Dec  6 2020  5:17PM    The following pending diet order is being considered for treatment of Severe protein-calorie malnutrition:  Diet Regular-  Supplement Feeding Modality:  Oral  Ensure Clear Cans or Servings Per Day:  1       Frequency:  Three Times a day  Entered: Dec  8 2020  3:35PM  
This patient has been assessed with a concern for Malnutrition and has been determined to have a diagnosis/diagnoses of Severe protein-calorie malnutrition.    This patient is being managed with:   Diet Regular-  Supplement Feeding Modality:  Oral  Ensure Clear Cans or Servings Per Day:  1       Frequency:  Three Times a day  Entered: Dec  8 2020  3:35PM    
This patient has been assessed with a concern for Malnutrition and has been determined to have a diagnosis/diagnoses of Severe protein-calorie malnutrition.    This patient is being managed with:   Diet DASH/TLC-  Sodium & Cholesterol Restricted  Entered: Dec  6 2020  5:17PM    The following pending diet order is being considered for treatment of Severe protein-calorie malnutrition:  Diet Regular-  Supplement Feeding Modality:  Oral  Ensure Clear Cans or Servings Per Day:  1       Frequency:  Three Times a day  Entered: Dec  8 2020  3:35PM  
This patient has been assessed with a concern for Malnutrition and has been determined to have a diagnosis/diagnoses of Severe protein-calorie malnutrition.    This patient is being managed with:   Diet Regular-  Supplement Feeding Modality:  Oral  Ensure Clear Cans or Servings Per Day:  1       Frequency:  Three Times a day  Entered: Dec  8 2020  3:35PM    

## 2020-12-15 NOTE — PROGRESS NOTE ADULT - PROBLEM SELECTOR PLAN 7
16 min face to face conversation held at patients bedside to discuss advanced directives and code status. Patient is  and has 1 daughter, patients  travels a lot and many times is not present so she would like to appoint her daughter as her HCP. Form will be obtained this afternoon and placed in patients chart. 16 min face to face conversation held at patients bedside to discuss advanced directives and code status. Patient is  and has 1 daughter, patients  travels a lot and many times is not present so she would like to appoint her daughter as her HCP. Form in patients chart.

## 2020-12-15 NOTE — PROGRESS NOTE ADULT - PROBLEM SELECTOR PLAN 6
Patient with a hx. of breast cancer in 2017 which she underwent chemo and radiation for now dx. in 2020 with uterine cancer with mets to stomach and liver despite chemo/radiation treatment. Patient sees Dr. Vital (oncology) for DMT. Oncology involvement appreciated.

## 2020-12-15 NOTE — PROGRESS NOTE ADULT - SUBJECTIVE AND OBJECTIVE BOX
HPI:    · HPI Objective Statement: 59 y/o F pmhx HTN, uterine cancer diagnosed sept 2020, s/p chemo and radiation, now with mets to the stomach and liver, pt was sent by her oncologist (Dr Vital) for paracentesis for large volume ascites noted on ct scan that was done on Monday. Pt states she feels stomach is full and has difficulty breathing at times and decreased PO intake.  Pt also has b/l leg swelling. She reports she had x3 episodes of N/V yesterday. Pt has no diarrhea, no CP, no fevers, no chills.   (06 Dec 2020 17:06)    PERTINENT PM/SXH:   Essential hypertension    Cancer of breast    Sleep apnea    Obesity (BMI 30.0-34.9)    Seasonal allergies    Acid reflux    Hypertension      S/P hysterectomy    S/P breast reconstruction, bilateral    S/P bilateral mastectomy    Knee injury      FAMILY HISTORY:    ITEMS NOT CHECKED ARE NOT PRESENT    SOCIAL HISTORY:   Significant other/partner[X ]  Children[X ]  Restorationist/Spirituality: Jehovah's witness   Substance hx:  [ ]   Tobacco hx:  [ ]   Alcohol hx: [ ]   Home Opioid hx:  [X ] I-Stop Reference No: [id number]  Living Situation: [X ]Home  [ ]Long term care  [ ]Rehab [ ]Other    ADVANCE DIRECTIVES:    DNR  MOLST  [ ]  Living Will  [ ]   DECISION MAKER(s):  [ ] Health Care Proxy(s)  [X ] Surrogate(s)  [ ] Guardian           Name(s): Phone Number(s):  Patrice Mg [phone]  Maribel Mg [phone]    BASELINE (I)ADL(s) (prior to admission):  [place]: [X ]Total  [ ] Moderate [ ]Dependent    Allergies    No Known Allergies    Intolerances    MEDICATIONS  (STANDING):  anastrozole 1 milliGRAM(s) Oral daily  apixaban 5 milliGRAM(s) Oral every 12 hours  furosemide    Tablet 40 milliGRAM(s) Oral daily  losartan 50 milliGRAM(s) Oral daily  metoprolol tartrate 25 milliGRAM(s) Oral two times a day  pantoprazole  Injectable 40 milliGRAM(s) IV Push daily  polyethylene glycol 3350 17 Gram(s) Oral daily  senna 2 Tablet(s) Oral at bedtime  spironolactone 50 milliGRAM(s) Oral daily  traMADol 25 milliGRAM(s) Oral three times a day    MEDICATIONS  (PRN):  acetaminophen   Tablet .. 650 milliGRAM(s) Oral every 6 hours PRN Mild Pain (1 - 3)  HYDROmorphone  Injectable 2 milliGRAM(s) IV Push every 3 hours PRN Severe Pain (7 - 10)  ondansetron Injectable 4 milliGRAM(s) IV Push every 6 hours PRN Nausea and/or Vomiting    PRESENT SYMPTOMS: [ ]Unable to obtain due to poor mentation   Source if other than patient:  [ ]Family   [ ]Team     Pain: [ ]yes [X]no  QOL impact -   Location -                    Aggravating factors -  Quality -  Radiation -  Timing-  Severity (0-10 scale):  Minimal acceptable level (0-10 scale):     PAIN AD Score:     [email or web address] (press ctrl +  left click to view)    Dyspnea:                           [ ]Mild [ ]Moderate [ ]Severe  Anxiety:                             [ ]Mild [ ]Moderate [ ]Severe  Fatigue:                             [ ]Mild [ ]Moderate [ ]Severe  Nausea:                             [ ]Mild [ ]Moderate [ ]Severe  Loss of appetite:              [ ]Mild [ ]Moderate [ ]Severe  Constipation:                    [ ]Mild [ ]Moderate [ ]Severe    Other Symptoms:  [X ]All other review of systems negative     Palliative Performance Status Version 2:         80%    http://npcrc.org/files/news/palliative_performance_scale_ppsv2.pdf  PHYSICAL EXAM:  Vital Signs Last 24 Hrs  T(C): 36.9 (15 Dec 2020 05:48), Max: 37.6 (14 Dec 2020 18:33)  T(F): 98.4 (15 Dec 2020 05:48), Max: 99.6 (14 Dec 2020 18:33)  HR: 92 (15 Dec 2020 05:48) (88 - 99)  BP: 98/66 (15 Dec 2020 05:48) (86/53 - 135/85)  BP(mean): --  RR: 18 (15 Dec 2020 05:48) (17 - 18)  SpO2: 96% (15 Dec 2020 05:48) (94% - 99%) I&O's Summary    14 Dec 2020 07:01  -  15 Dec 2020 07:00  --------------------------------------------------------  IN: 260 mL / OUT: 0 mL / NET: 260 mL      GENERAL:  [X ]Alert  [X ]Oriented x 3  [ ]Lethargic  [ ]Cachexia  [ ]Unarousable  [X ]Verbal  [ ]Non-Verbal  Behavioral:   [ ] Anxiety  [ ] Delirium [ ] Agitation [X] Other: Calm  HEENT:  [X ]Normal   [ ]Dry mouth   [ ]ET Tube/Trach  [ ]Oral lesions  PULMONARY:   [X ]Clear [ ]Tachypnea  [ ]Audible excessive secretions   [ ]Rhonchi        [ ]Right [ ]Left [ ]Bilateral  [ ]Crackles        [ ]Right [ ]Left [ ]Bilateral  [ ]Wheezing     [ ]Right [ ]Left [ ]Bilatera  [ ]Diminished breath sounds [ ]right [ ]left [ ]bilateral  CARDIOVASCULAR:    [X ]Regular [ ]Irregular [ ]Tachy  [ ]Dev [ ]Murmur [ ]Other  GASTROINTESTINAL:  [X ]Soft  [ ]Distended   [ ]+BS  [X]Non tender [ ]Tender  [ ]PEG [ ]OGT/ NGT  Last BM: 12/14    GENITOURINARY:  [X ]Normal [ ] Incontinent   [ ]Oliguria/Anuria   [ ]Schneider  MUSCULOSKELETAL:   [ ]Normal   [X ]Weakness  [ ]Bed/Wheelchair bound [ ]Edema  NEUROLOGIC:   [X ]No focal deficits  [ ]Cognitive impairment  [ ]Dysphagia [ ]Dysarthria [ ]Paresis [ ]Other   SKIN:   [ ]Normal    [ ]Rash  [ ]Pressure ulcer(s)       Present on admission [ ]y [ ]n       [X] Surgical incision     CRITICAL CARE:  [ ] Shock Present  [ ]Septic [ ]Cardiogenic [ ]Neurologic [ ]Hypovolemic  [ ]  Vasopressors [ ]  Inotropes   [ ]Respiratory failure present [ ]Mechanical ventilation [ ]Non-invasive ventilatory support [ ]High flow  [ ]Acute  [ ]Chronic [ ]Hypoxic  [ ]Hypercarbic [ ]Other  [ ]Other organ failure     LABS:                        7.8    5.49  )-----------( 348      ( 14 Dec 2020 07:52 )             25.2   12-14    134<L>  |  94<L>  |  9   ----------------------------<  91  3.4<L>   |  33<H>  |  0.75    Ca    8.8      14 Dec 2020 07:52    TPro  5.7<L>  /  Alb  x   /  TBili  x   /  DBili  x   /  AST  x   /  ALT  x   /  AlkPhos  x   12-14        RADIOLOGY & ADDITIONAL STUDIES:    < from: Xray Chest 1 View- PORTABLE-Urgent (Xray Chest 1 View- PORTABLE-Urgent .) (12.06.20 @ 16:53) >    Impression: Limited. No gross infiltrates.    < end of copied text >      < from: Xray Abdomen 2 Views (12.06.20 @ 16:53) >  Impression: Suspicion for ascites. No obstruction or free air.    < end of copied text >      < from: US Duplex Venous Lower Ext Complete, Bilateral (12.06.20 @ 17:06) >    IMPRESSION:  Partial thrombosis of the right posterior tibial and right peroneal vein.    Partial thrombosis of the distal left femoral vein    < end of copied text >      < from: US Paracentesis (12.07.20 @ 11:14) >  Impression: Successful sonographic guided paracentesis.    < end of copied text >    < from: CT Abdomen and Pelvis w/ Oral Cont and w/ IV Cont (12.10.20 @ 12:46) >  IMPRESSION:  Large loculated collection in the midabdomen extending into the left upper quadrant and tracking around the left hepaticlobe is slightly larger than prior.    Right subphrenic/perihepatic collection which extends across the midline into the pelvis is significantly smaller than prior.    Hepatic metastases. Splenic lesion presumably metastatic.    Distended jejunum likely ileus. Contrast reaches the right colon.    < end of copied text >    < from: US Paracentesis (12.11.20 @ 12:48) >  Impression: Successful sonographic guided paracentesis.    < end of copied text >      PROTEIN CALORIE MALNUTRITION PRESENT: [ ]mild [ ]moderate [ ]severe [ ]underweight [ ]morbid obesity  https://www.andeal.org/vault/5130/web/files/ONC/Table_Clinical%20Characteristics%20to%20Document%20Malnutrition-White%20JV%20et%20    Height (cm): 170.2 (12-06-20 @ 12:31), 170.2 (09-07-20 @ 18:35), 170.18 (08-28-20 @ 01:37)  Weight (kg): 75 (12-06-20 @ 12:39), 80 (09-10-20 @ 13:40), 79.8 (08-28-20 @ 01:37)  BMI (kg/m2): 25.9 (12-06-20 @ 12:39), 27.6 (12-06-20 @ 12:31), 27.6 (09-10-20 @ 13:40)    [ ]PPSV2 < or = to 30% [ ]significant weight loss  [ ]poor nutritional intake  [ ]anasarca     Albumin, Serum: 2.6 g/dL (12-07-20 @ 07:43)   [ ]Artificial Nutrition      REFERRALS:   [ ]Chaplaincy  [ ]Hospice  [ ]Child Life  [X ]Social Work  [X ]Case management [ ]Holistic Therapy     Goals of Care Document: HPI:    · HPI Objective Statement: 59 y/o F pmhx HTN, uterine cancer diagnosed sept 2020, s/p chemo and radiation, now with mets to the stomach and liver, pt was sent by her oncologist (Dr Vital) for paracentesis for large volume ascites noted on ct scan that was done on Monday. Pt states she feels stomach is full and has difficulty breathing at times and decreased PO intake.  Pt also has b/l leg swelling. She reports she had x3 episodes of N/V yesterday. Pt has no diarrhea, no CP, no fevers, no chills.   (06 Dec 2020 17:06)    PERTINENT PM/SXH:   Essential hypertension    Cancer of breast    Sleep apnea    Obesity (BMI 30.0-34.9)    Seasonal allergies    Acid reflux    Hypertension      S/P hysterectomy    S/P breast reconstruction, bilateral    S/P bilateral mastectomy    Knee injury      FAMILY HISTORY:    ITEMS NOT CHECKED ARE NOT PRESENT    SOCIAL HISTORY:   Significant other/partner[X ]  Children[X ]  Anabaptism/Spirituality: Shinto   Substance hx:  [ ]   Tobacco hx:  [ ]   Alcohol hx: [ ]   Home Opioid hx:  [X ] I-Stop Reference No: [id number]  Living Situation: [X ]Home  [ ]Long term care  [ ]Rehab [ ]Other    ADVANCE DIRECTIVES:    DNR  MOLST  [ ]  Living Will  [ ]   DECISION MAKER(s):  [X ] Health Care Proxy(s)  [ ] Surrogate(s)  [ ] Guardian           Name(s): Phone Number(s):  Maribel Hurt [phone]    BASELINE (I)ADL(s) (prior to admission):  [place]: [X ]Total  [ ] Moderate [ ]Dependent    Allergies    No Known Allergies    Intolerances    MEDICATIONS  (STANDING):  anastrozole 1 milliGRAM(s) Oral daily  apixaban 5 milliGRAM(s) Oral every 12 hours  furosemide    Tablet 40 milliGRAM(s) Oral daily  losartan 50 milliGRAM(s) Oral daily  metoprolol tartrate 25 milliGRAM(s) Oral two times a day  pantoprazole  Injectable 40 milliGRAM(s) IV Push daily  polyethylene glycol 3350 17 Gram(s) Oral daily  senna 2 Tablet(s) Oral at bedtime  spironolactone 50 milliGRAM(s) Oral daily  traMADol 25 milliGRAM(s) Oral three times a day    MEDICATIONS  (PRN):  acetaminophen   Tablet .. 650 milliGRAM(s) Oral every 6 hours PRN Mild Pain (1 - 3)  HYDROmorphone  Injectable 2 milliGRAM(s) IV Push every 3 hours PRN Severe Pain (7 - 10)  ondansetron Injectable 4 milliGRAM(s) IV Push every 6 hours PRN Nausea and/or Vomiting    PRESENT SYMPTOMS: [ ]Unable to obtain due to poor mentation   Source if other than patient:  [ ]Family   [ ]Team     Pain: [ ]yes [X]no  QOL impact -   Location -                    Aggravating factors -  Quality -  Radiation -  Timing-  Severity (0-10 scale):  Minimal acceptable level (0-10 scale):     PAIN AD Score:     [email or web address].[email or web address]/painad.pdf (press ctrl +  left click to view)    Dyspnea:                           [ ]Mild [ ]Moderate [ ]Severe  Anxiety:                             [ ]Mild [ ]Moderate [ ]Severe  Fatigue:                             [ ]Mild [ ]Moderate [ ]Severe  Nausea:                             [ ]Mild [ ]Moderate [ ]Severe  Loss of appetite:              [ ]Mild [ ]Moderate [ ]Severe  Constipation:                    [ ]Mild [ ]Moderate [ ]Severe    Other Symptoms:  [X ]All other review of systems negative     Palliative Performance Status Version 2:         80%    http://npcrc.org/files/news/palliative_performance_scale_ppsv2.pdf  PHYSICAL EXAM:  Vital Signs Last 24 Hrs  T(C): 36.9 (15 Dec 2020 05:48), Max: 37.6 (14 Dec 2020 18:33)  T(F): 98.4 (15 Dec 2020 05:48), Max: 99.6 (14 Dec 2020 18:33)  HR: 92 (15 Dec 2020 05:48) (88 - 99)  BP: 98/66 (15 Dec 2020 05:48) (86/53 - 135/85)  BP(mean): --  RR: 18 (15 Dec 2020 05:48) (17 - 18)  SpO2: 96% (15 Dec 2020 05:48) (94% - 99%) I&O's Summary    14 Dec 2020 07:01  -  15 Dec 2020 07:00  --------------------------------------------------------  IN: 260 mL / OUT: 0 mL / NET: 260 mL      GENERAL:  [X ]Alert  [X ]Oriented x 3  [ ]Lethargic  [ ]Cachexia  [ ]Unarousable  [X ]Verbal  [ ]Non-Verbal  Behavioral:   [ ] Anxiety  [ ] Delirium [ ] Agitation [X] Other: Calm  HEENT:  [X ]Normal   [ ]Dry mouth   [ ]ET Tube/Trach  [ ]Oral lesions  PULMONARY:   [X ]Clear [ ]Tachypnea  [ ]Audible excessive secretions   [ ]Rhonchi        [ ]Right [ ]Left [ ]Bilateral  [ ]Crackles        [ ]Right [ ]Left [ ]Bilateral  [ ]Wheezing     [ ]Right [ ]Left [ ]Bilatera  [ ]Diminished breath sounds [ ]right [ ]left [ ]bilateral  CARDIOVASCULAR:    [X ]Regular [ ]Irregular [ ]Tachy  [ ]Dev [ ]Murmur [ ]Other  GASTROINTESTINAL:  [X ]Soft  [ ]Distended   [ ]+BS  [X]Non tender [ ]Tender  [ ]PEG [ ]OGT/ NGT  Last BM: 12/14    GENITOURINARY:  [X ]Normal [ ] Incontinent   [ ]Oliguria/Anuria   [ ]Schneider  MUSCULOSKELETAL:   [ ]Normal   [X ]Weakness  [ ]Bed/Wheelchair bound [ ]Edema  NEUROLOGIC:   [X ]No focal deficits  [ ]Cognitive impairment  [ ]Dysphagia [ ]Dysarthria [ ]Paresis [ ]Other   SKIN:   [ ]Normal    [ ]Rash  [ ]Pressure ulcer(s)       Present on admission [ ]y [ ]n       [X] Surgical incision     CRITICAL CARE:  [ ] Shock Present  [ ]Septic [ ]Cardiogenic [ ]Neurologic [ ]Hypovolemic  [ ]  Vasopressors [ ]  Inotropes   [ ]Respiratory failure present [ ]Mechanical ventilation [ ]Non-invasive ventilatory support [ ]High flow  [ ]Acute  [ ]Chronic [ ]Hypoxic  [ ]Hypercarbic [ ]Other  [ ]Other organ failure     LABS:                        7.8    5.49  )-----------( 348      ( 14 Dec 2020 07:52 )             25.2   12-14    134<L>  |  94<L>  |  9   ----------------------------<  91  3.4<L>   |  33<H>  |  0.75    Ca    8.8      14 Dec 2020 07:52    TPro  5.7<L>  /  Alb  x   /  TBili  x   /  DBili  x   /  AST  x   /  ALT  x   /  AlkPhos  x   12-14        RADIOLOGY & ADDITIONAL STUDIES:    < from: Xray Chest 1 View- PORTABLE-Urgent (Xray Chest 1 View- PORTABLE-Urgent .) (12.06.20 @ 16:53) >    Impression: Limited. No gross infiltrates.    < end of copied text >      < from: Xray Abdomen 2 Views (12.06.20 @ 16:53) >  Impression: Suspicion for ascites. No obstruction or free air.    < end of copied text >      < from: US Duplex Venous Lower Ext Complete, Bilateral (12.06.20 @ 17:06) >    IMPRESSION:  Partial thrombosis of the right posterior tibial and right peroneal vein.    Partial thrombosis of the distal left femoral vein    < end of copied text >      < from: US Paracentesis (12.07.20 @ 11:14) >  Impression: Successful sonographic guided paracentesis.    < end of copied text >    < from: CT Abdomen and Pelvis w/ Oral Cont and w/ IV Cont (12.10.20 @ 12:46) >  IMPRESSION:  Large loculated collection in the midabdomen extending into the left upper quadrant and tracking around the left hepaticlobe is slightly larger than prior.    Right subphrenic/perihepatic collection which extends across the midline into the pelvis is significantly smaller than prior.    Hepatic metastases. Splenic lesion presumably metastatic.    Distended jejunum likely ileus. Contrast reaches the right colon.    < end of copied text >    < from: US Paracentesis (12.11.20 @ 12:48) >  Impression: Successful sonographic guided paracentesis.    < end of copied text >      PROTEIN CALORIE MALNUTRITION PRESENT: [ ]mild [ ]moderate [ ]severe [ ]underweight [ ]morbid obesity  https://www.andeal.org/vault/2440/web/files/ONC/Table_Clinical%20Characteristics%20to%20Document%20Malnutrition-White%20JV%20et%20    Height (cm): 170.2 (12-06-20 @ 12:31), 170.2 (09-07-20 @ 18:35), 170.18 (08-28-20 @ 01:37)  Weight (kg): 75 (12-06-20 @ 12:39), 80 (09-10-20 @ 13:40), 79.8 (08-28-20 @ 01:37)  BMI (kg/m2): 25.9 (12-06-20 @ 12:39), 27.6 (12-06-20 @ 12:31), 27.6 (09-10-20 @ 13:40)    [ ]PPSV2 < or = to 30% [ ]significant weight loss  [ ]poor nutritional intake  [ ]anasarca     Albumin, Serum: 2.6 g/dL (12-07-20 @ 07:43)   [ ]Artificial Nutrition      REFERRALS:   [ ]Chaplaincy  [ ]Hospice  [ ]Child Life  [X ]Social Work  [X ]Case management [ ]Holistic Therapy     Goals of Care Document: HCP

## 2020-12-15 NOTE — PROGRESS NOTE ADULT - PROBLEM SELECTOR PLAN 5
Patient was sent by her oncologist (Dr Vital) for paracentesis for large volume ascites noted on ct scan. Ascites most likely due to worsening/progression of disease. Patient is s/p paracentesis on 12/7 and then again on 12/11, she reports significant relief.

## 2020-12-15 NOTE — PROGRESS NOTE ADULT - PROBLEM SELECTOR PLAN 1
- pallaitive care evaluation - for adavce directive  - continue arimidex  - Eliquis for DVT  - Physical Therapy going home vs rehab

## 2020-12-15 NOTE — PROGRESS NOTE ADULT - PROBLEM SELECTOR PLAN 4
Patient reports generalized weakness. PPS of 80%, please assist with ADL's PRN, PT involvement appreciated.

## 2020-12-15 NOTE — PROGRESS NOTE ADULT - PROBLEM SELECTOR PLAN 1
Upon evaluation this morning patient reports no pain but states that this morning she did have some mild abdominal pain with a severity of 4 that was alleviated with current pain regimen. Patient is currently ordered for Tramadol 25mg PO TID along with Dilaudid 2mg IVP q3h PRN for severe pain. Would recommend Tramadol 25mg PO TID for mild pain, Tramadol 50mg PO PRN q4h for moderate pain, and Morphine 2mg IVP q4h PRN for severe pain. Continue with bowel regimen to prevent constipation in the use of opiates.

## 2020-12-15 NOTE — CHART NOTE - NSCHARTNOTEFT_GEN_A_CORE
Pt admitted c large volume ascites, SOB, early satiety, decreased PO intake, b/l leg swelling, N/V. Pt now c uterine Ca c mets to stomach & liver; scheduled to start chemo in January; presently too weak & debilitated to initiate. Palliative Care consult for GOC discussion & symptoms management; pt wishes to remain full code & try to get stronger to start chemo. Paracenteses performed on 12/7 (3400 ml removed) & again on 12/11 (1350 ml removed). PMHx includes HTN, breast Ca c mastectomy & reconstruction.     Factors impacting intake: [ ] none [ ] nausea  [ ] vomiting [ ] diarrhea [ ] constipation  [ ]chewing problems [ ] swallowing issues  [X ] other: persistent lack of appetite; early satiety    Diet Prescription: Diet, Regular:   Supplement Feeding Modality:  Oral  Ensure Clear Cans or Servings Per Day:  1       Frequency:  Three Times a day (12-08-20 @ 15:35)    Intake:  poor intake continues but has improved; 50-75% most meals; drinking some of supplement    Current Weight:   74.9 kg (12/14); admission 75 kg (12/6)  % Weight Change; stable wt x 8 days not necessarily reflective of nutritional status as affected by ascites/edema status    Nutrition focused physical exam conducted:    Subcutaneous fat loss: [ ] Orbital fat pads region, [ ]Buccal fat region, [ ]Triceps region,  [ ]Ribs region.    Muscle wasting: [ ]Temples region, [ ]Clavicle region, [ ]Shoulder region, [ ]Scapula region, [ ]Interosseous region,  [ ]thigh region, [ ]Calf region    Physical Appearance:  presently no edema noted; at admission 2+ edema noted b/l legs and large volume ascite present    Pertinent Medications: MEDICATIONS  (STANDING):  anastrozole 1 milliGRAM(s) Oral daily  apixaban 5 milliGRAM(s) Oral every 12 hours  furosemide    Tablet 40 milliGRAM(s) Oral daily  losartan 50 milliGRAM(s) Oral daily  metoprolol tartrate 25 milliGRAM(s) Oral two times a day  pantoprazole  Injectable 40 milliGRAM(s) IV Push daily  polyethylene glycol 3350 17 Gram(s) Oral daily  senna 2 Tablet(s) Oral at bedtime  spironolactone 50 milliGRAM(s) Oral daily  traMADol 25 milliGRAM(s) Oral three times a day    MEDICATIONS  (PRN):  acetaminophen   Tablet .. 650 milliGRAM(s) Oral every 6 hours PRN Mild Pain (1 - 3)  morphine  - Injectable 2 milliGRAM(s) IV Push every 4 hours PRN Severe Pain (7 - 10) or Dyspnea  ondansetron Injectable 4 milliGRAM(s) IV Push every 6 hours PRN Nausea and/or Vomiting  traMADol 50 milliGRAM(s) Oral every 6 hours PRN Moderate Pain (4 - 6)    Pertinent Labs: 12-14 Na134 mmol/L<L> Glu 91 mg/dL K+ 3.4 mmol/L<L> Cr  0.75 mg/dL BUN 9 mg/dL    Skin:   WDL; surgical incision noted in abdominal area    Estimated Needs:   [X ] no change since previous assessment  (12/8)  [ ] recalculated:     Previous Nutrition Diagnosis:   [x ] Severe Malnutrition - chronic  Etiology:  Inadequate energy/protein intake + increased energy/protein needs related to metastasized uterine Ca c ascites, SOB  Signs & Symptoms:  physical findings of severe muscle wasting & 15% wt loss x 3 months    Previous GOAL:  Provide nutrition/hydration as medically appropriate & within pt's wishes for tx  NEW GOAL:  Pt to consume >50% meals/supplements during hospitalization    Nutrition Diagnosis is [X ] ongoing  [ ] resolved [ ] not applicable     New Nutrition Diagnosis: [ X] not applicable    Interventions:   continue current diet rx as noted  Recommend  [ ] Change Diet To:  [ ] Nutrition Supplement  [ ] Nutrition Support  [ ] Other:     Monitoring and Evaluation:   [X ] PO intake [ x ] Tolerance to diet prescription [ x ] weights [ x ] labs[ x ] follow up per protocol  [ ] other: Pt admitted c large volume ascites, SOB, early satiety, decreased PO intake, b/l leg swelling, N/V. Pt now c uterine Ca c mets to stomach & liver; scheduled to start chemo in January; presently too weak & debilitated to initiate. Palliative Care consult for GOC discussion & symptoms management; pt wishes to remain full code & try to get stronger to start chemo. Paracenteses performed on 12/7 (3400 ml removed) & again on 12/11 (1350 ml removed). PMHx includes HTN, breast Ca c mastectomy & reconstruction.     Factors impacting intake: [ ] none [ ] nausea  [ ] vomiting [ ] diarrhea [ ] constipation  [ ]chewing problems [ ] swallowing issues  [X ] other: persistent lack of appetite; early satiety    Diet Prescription: Diet, Regular:   Supplement Feeding Modality:  Oral  Ensure Clear Cans or Servings Per Day:  1       Frequency:  Three Times a day (12-08-20 @ 15:35)    Intake:  poor intake continues but has improved; 50-75% most meals; drinking some of supplement    Current Weight:   74.9 kg (12/14); admission 75 kg (12/6)  % Weight Change; stable wt x 8 days not necessarily reflective of nutritional status as affected by ascites/edema status    Nutrition focused physical exam conducted:    Subcutaneous fat loss: [mild ] Orbital fat pads region, [WDL ]Buccal fat region, [moderate ]Triceps region,  [moderate ]Ribs region.    Muscle wasting: [ severe]Temples region, [severe]Clavicle region, [severe ]Shoulder region, [unable ]Scapula region, [moderate ]Interosseous region,  [WDL ]thigh region, [WDL ]Calf region    Physical Appearance:  presently no edema noted; at admission 2+ edema noted b/l legs and large volume ascite present    Pertinent Medications: MEDICATIONS  (STANDING):  anastrozole 1 milliGRAM(s) Oral daily  apixaban 5 milliGRAM(s) Oral every 12 hours  furosemide    Tablet 40 milliGRAM(s) Oral daily  losartan 50 milliGRAM(s) Oral daily  metoprolol tartrate 25 milliGRAM(s) Oral two times a day  pantoprazole  Injectable 40 milliGRAM(s) IV Push daily  polyethylene glycol 3350 17 Gram(s) Oral daily  senna 2 Tablet(s) Oral at bedtime  spironolactone 50 milliGRAM(s) Oral daily  traMADol 25 milliGRAM(s) Oral three times a day    MEDICATIONS  (PRN):  acetaminophen   Tablet .. 650 milliGRAM(s) Oral every 6 hours PRN Mild Pain (1 - 3)  morphine  - Injectable 2 milliGRAM(s) IV Push every 4 hours PRN Severe Pain (7 - 10) or Dyspnea  ondansetron Injectable 4 milliGRAM(s) IV Push every 6 hours PRN Nausea and/or Vomiting  traMADol 50 milliGRAM(s) Oral every 6 hours PRN Moderate Pain (4 - 6)    Pertinent Labs: 12-14 Na134 mmol/L<L> Glu 91 mg/dL K+ 3.4 mmol/L<L> Cr  0.75 mg/dL BUN 9 mg/dL    Skin:   WDL; surgical incision noted in abdominal area    Estimated Needs:   [X ] no change since previous assessment  (12/8)  [ ] recalculated:     Previous Nutrition Diagnosis:   [x ] Severe Malnutrition - chronic  Etiology:  Inadequate energy/protein intake + increased energy/protein needs related to metastasized uterine Ca c ascites, SOB  Signs & Symptoms:  physical findings of severe muscle wasting & 15% wt loss x 3 months    Previous GOAL:  Provide nutrition/hydration as medically appropriate & within pt's wishes for tx  NEW GOAL:  Pt to consume >50% meals/supplements during hospitalization    Nutrition Diagnosis is [X ] ongoing  [ ] resolved [ ] not applicable     New Nutrition Diagnosis: [ X] not applicable    Interventions:   Regular diet  Recommend  [ ] Change Diet To:  [x ] Nutrition Supplement: Ensure Enlive x 2/day (provides 700 kcal, 40 g protein)   [ ] Nutrition Support  [X ] Other: stressed importance of increased PO intake to assist in recovery & building strength to initiate chemo; maximize intake on days when she is feeling better    Monitoring and Evaluation:   [X ] PO intake [ x ] Tolerance to diet prescription [ x ] weights [ x ] labs[ x ] follow up per protocol  [ ] other:

## 2020-12-15 NOTE — PROGRESS NOTE ADULT - SUBJECTIVE AND OBJECTIVE BOX
feeling better    Vital Signs Last 24 Hrs  T(C): 36.9 (15 Dec 2020 05:48), Max: 37.6 (14 Dec 2020 18:33)  T(F): 98.4 (15 Dec 2020 05:48), Max: 99.6 (14 Dec 2020 18:33)  HR: 92 (15 Dec 2020 05:48) (88 - 99)  BP: 98/66 (15 Dec 2020 05:48) (86/53 - 135/85)  BP(mean): --  RR: 18 (15 Dec 2020 05:48) (17 - 18)  SpO2: 96% (15 Dec 2020 05:48) (94% - 99%)    PHYSICAL EXAM:    general - AAO x 3  HEENT - No Icterus  CVS - RRR  RS - AE B/L  Abd - soft, distended  Ext - Pulses +        LABS:                        7.8    5.49  )-----------( 348      ( 14 Dec 2020 07:52 )             25.2     12-14    134<L>  |  94<L>  |  9   ----------------------------<  91  3.4<L>   |  33<H>  |  0.75    Ca    8.8      14 Dec 2020 07:52    TPro  5.7<L>  /  Alb  x   /  TBili  x   /  DBili  x   /  AST  x   /  ALT  x   /  AlkPhos  x   12-14          Culture - Body Fluid with Gram Stain (collected 07 Dec 2020 15:20)  Source: Ascites Fl ascites s/p paracentesis  Gram Stain (12 Dec 2020 10:49):    polymorphonuclear leukocytes seen    No organisms seen    by cytocentrifuge  Final Report (12 Dec 2020 10:49):    No growth at 5 days        RADIOLOGY & ADDITIONAL STUDIES:

## 2020-12-15 NOTE — PROGRESS NOTE ADULT - SUBJECTIVE AND OBJECTIVE BOX
Patient is a 60y old  Female who presents with a chief complaint of malignant ascites (15 Dec 2020 11:12)  alert. no distress.   stable for discharge    INTERVAL HPI/OVERNIGHT EVENTS:  PAST MEDICAL & SURGICAL HISTORY:  Essential hypertension    Cancer of breast  Rt. Female    Sleep apnea  Use Oral Device    Obesity (BMI 30.0-34.9)    Seasonal allergies    Acid reflux    S/P hysterectomy    S/P breast reconstruction, bilateral    S/P bilateral mastectomy    Knee injury  Left &amp; had surgery about 30 years ago        MEDICATIONS  (STANDING):  anastrozole 1 milliGRAM(s) Oral daily  apixaban 5 milliGRAM(s) Oral every 12 hours  furosemide    Tablet 40 milliGRAM(s) Oral daily  losartan 50 milliGRAM(s) Oral daily  metoprolol tartrate 25 milliGRAM(s) Oral two times a day  pantoprazole  Injectable 40 milliGRAM(s) IV Push daily  polyethylene glycol 3350 17 Gram(s) Oral daily  senna 2 Tablet(s) Oral at bedtime  spironolactone 50 milliGRAM(s) Oral daily  traMADol 25 milliGRAM(s) Oral three times a day    MEDICATIONS  (PRN):  acetaminophen   Tablet .. 650 milliGRAM(s) Oral every 6 hours PRN Mild Pain (1 - 3)  morphine  - Injectable 2 milliGRAM(s) IV Push every 4 hours PRN Severe Pain (7 - 10) or Dyspnea  ondansetron Injectable 4 milliGRAM(s) IV Push every 6 hours PRN Nausea and/or Vomiting  traMADol 50 milliGRAM(s) Oral every 6 hours PRN Moderate Pain (4 - 6)      Allergies    No Known Allergies    Intolerances        REVIEW OF SYSTEMS:  CONSTITUTIONAL: No fever, weight loss, or fatigue  EYES: No eye pain, visual disturbances, or discharge  ENMT:  No difficulty hearing, tinnitus, vertigo; No sinus or throat pain  NECK: No pain or stiffness  BREASTS: No pain, masses, or nipple discharge  RESPIRATORY: No cough, wheezing, chills or hemoptysis; No shortness of breath  CARDIOVASCULAR: No chest pain, palpitations, dizziness, or leg swelling  GASTROINTESTINAL: No abdominal or epigastric pain. No nausea, vomiting, or hematemesis; No diarrhea or constipation. No melena or hematochezia.  GENITOURINARY: No dysuria, frequency, hematuria, or incontinence  NEUROLOGICAL: No headaches, memory loss, loss of strength, numbness, or tremors  SKIN: No itching, burning, rashes, or lesions   LYMPH NODES: No enlarged glands  ENDOCRINE: No heat or cold intolerance; No hair loss  MUSCULOSKELETAL: No joint pain or swelling; No muscle, back, or extremity pain  PSYCHIATRIC: No depression, anxiety, mood swings, or difficulty sleeping  HEME/LYMPH: No easy bruising, or bleeding gums  ALLERY AND IMMUNOLOGIC: No hives or eczema    Vital Signs Last 24 Hrs  T(C): 37.3 (15 Dec 2020 16:32), Max: 37.6 (14 Dec 2020 18:33)  T(F): 99.1 (15 Dec 2020 16:32), Max: 99.6 (14 Dec 2020 18:33)  HR: 101 (15 Dec 2020 16:32) (92 - 101)  BP: 101/68 (15 Dec 2020 16:32) (97/63 - 135/85)  BP(mean): --  RR: 18 (15 Dec 2020 16:32) (17 - 18)  SpO2: 96% (15 Dec 2020 16:32) (94% - 99%)    PHYSICAL EXAM:  GENERAL: NAD, well-groomed, well-developed  HEAD:  Atraumatic, Normocephalic  EYES: EOMI, PERRLA, conjunctiva and sclera clear  ENMT: No tonsillar erythema, exudates, or enlargement; Moist mucous membranes, Good dentition, No lesions  NECK: Supple, No JVD, Normal thyroid  NERVOUS SYSTEM:  Alert & Oriented X3, Good concentration; Motor Strength 5/5 B/L upper and lower extremities; DTRs 2+ intact and symmetric  CHEST/LUNG: Clear to percussion bilaterally; No rales, rhonchi, wheezing, or rubs  HEART: Regular rate and rhythm; No murmurs, rubs, or gallops  ABDOMEN: Soft, Nontender, Nondistended; Bowel sounds present  EXTREMITIES:  2+ Peripheral Pulses, No clubbing, cyanosis, or edema  LYMPH: No lymphadenopathy noted  SKIN: No rashes or lesions    LABS:                        7.8    5.49  )-----------( 348      ( 14 Dec 2020 07:52 )             25.2     12-14    134<L>  |  94<L>  |  9   ----------------------------<  91  3.4<L>   |  33<H>  |  0.75    Ca    8.8      14 Dec 2020 07:52    TPro  5.7<L>  /  Alb  x   /  TBili  x   /  DBili  x   /  AST  x   /  ALT  x   /  AlkPhos  x   12-14          CAPILLARY BLOOD GLUCOSE                    RADIOLOGY & ADDITIONAL TESTS:    Imaging Personally Reviewed:  [ ] YES  [ ] NO    Consultant(s) Notes Reviewed:  [ ] YES  [ ] NO    Care Discussed with Consultants/Other Providers [ ] YES  [ ] NO    Care discussed with family,         [  ]   yes  [  ]  No    imp:    stable[ ]    unstable[  ]     improving [   ]       unchanged  [  ]                Plans:  Continue present plans  [ x ] palliative care               New consult [  ]   specialty  .......               order test[  ]    test name.                  Discharge Planning  [  ]

## 2020-12-16 ENCOUNTER — TRANSCRIPTION ENCOUNTER (OUTPATIENT)
Age: 60
End: 2020-12-16

## 2020-12-16 VITALS
DIASTOLIC BLOOD PRESSURE: 83 MMHG | SYSTOLIC BLOOD PRESSURE: 118 MMHG | HEART RATE: 118 BPM | TEMPERATURE: 98 F | RESPIRATION RATE: 18 BRPM | OXYGEN SATURATION: 99 %

## 2020-12-16 PROCEDURE — 99233 SBSQ HOSP IP/OBS HIGH 50: CPT

## 2020-12-16 RX ADMIN — SPIRONOLACTONE 50 MILLIGRAM(S): 25 TABLET, FILM COATED ORAL at 05:57

## 2020-12-16 RX ADMIN — Medication 40 MILLIGRAM(S): at 05:57

## 2020-12-16 RX ADMIN — TRAMADOL HYDROCHLORIDE 25 MILLIGRAM(S): 50 TABLET ORAL at 05:57

## 2020-12-16 RX ADMIN — APIXABAN 5 MILLIGRAM(S): 2.5 TABLET, FILM COATED ORAL at 05:57

## 2020-12-16 RX ADMIN — TRAMADOL HYDROCHLORIDE 25 MILLIGRAM(S): 50 TABLET ORAL at 06:54

## 2020-12-16 NOTE — DISCHARGE NOTE NURSING/CASE MANAGEMENT/SOCIAL WORK - PATIENT PORTAL LINK FT
You can access the FollowMyHealth Patient Portal offered by [facility] by registering at the following website: [email or web address]. By joining [facility]’s FollowMyHealth portal, you will also be able to view your health information using other applications (apps) compatible with our system.

## 2020-12-16 NOTE — PROGRESS NOTE ADULT - PROBLEM SELECTOR PLAN 2
NOTIFICATION RETURN TO WORK / SCHOOL 
 
7/22/2020 12:52 AM 
 
Ms. Raymond Winters [address] [facility] To Whom It May Concern: 
 
Raymond Winters is currently under the care of [address] Please be aware that Mrs. Amadeo Montoya has Asthma which may put her at higher risk during the current COVID- 19 pandemic. If there are questions or concerns please have the patient contact our office. Sincerely, Therese Guerrero MD 
 
                                
 

Patient denies any SOB this morning. On admission patient reported feeling SOB at times. If patient reports SOB or has a RR > 18 would recommend patient receive Morphine 2mg IVP q4H PRN for dyspnea.
Patient denies any SOB this morning. On admission patient reported feeling SOB at times. If patient reports SOB or has a RR > 18 would recommend patient receive Morphine 2mg IVP q4H PRN for dyspnea.  Patient has not required any PRNs in the last 24 hours.

## 2020-12-16 NOTE — PROGRESS NOTE ADULT - PROBLEM SELECTOR PLAN 1
- patient feels better, states may want to go home  - contniue Arimidex  - contniue eliquis for DVT  - if going home can f/u in office next week

## 2020-12-16 NOTE — PROGRESS NOTE ADULT - PROBLEM SELECTOR PLAN 6
Patient with a hx. of breast cancer in 2017 which she underwent chemo and radiation for now dx. in 2020 with uterine cancer with mets to stomach and liver despite chemo/radiation treatment. Patient sees Dr. Vital (oncology) for DMT. Oncology involvement appreciated. Patient remains hopeful in getting stronger and being able to obtain DMT in the near future.

## 2020-12-16 NOTE — PROGRESS NOTE ADULT - SUBJECTIVE AND OBJECTIVE BOX
SUBJECTIVE AND OBJECTIVE: Patient found sitting in the chair this morning in better spirits and stating that she feels much better   INTERVAL HPI/OVERNIGHT EVENTS: has not required any PRNs in the last 24 hours     DNR on chart:   Allergies    No Known Allergies    Intolerances    MEDICATIONS  (STANDING):  anastrozole 1 milliGRAM(s) Oral daily  apixaban 5 milliGRAM(s) Oral every 12 hours  furosemide    Tablet 40 milliGRAM(s) Oral daily  losartan 50 milliGRAM(s) Oral daily  metoprolol tartrate 25 milliGRAM(s) Oral two times a day  pantoprazole  Injectable 40 milliGRAM(s) IV Push daily  polyethylene glycol 3350 17 Gram(s) Oral daily  senna 2 Tablet(s) Oral at bedtime  spironolactone 50 milliGRAM(s) Oral daily  traMADol 25 milliGRAM(s) Oral three times a day    MEDICATIONS  (PRN):  acetaminophen   Tablet .. 650 milliGRAM(s) Oral every 6 hours PRN Mild Pain (1 - 3)  morphine  - Injectable 2 milliGRAM(s) IV Push every 4 hours PRN Severe Pain (7 - 10) or Dyspnea  ondansetron Injectable 4 milliGRAM(s) IV Push every 6 hours PRN Nausea and/or Vomiting  traMADol 50 milliGRAM(s) Oral every 6 hours PRN Moderate Pain (4 - 6)      ITEMS UNCHECKED ARE NOT PRESENT    PRESENT SYMPTOMS: [ ]Unable to obtain due to poor mentation   Source if other than patient:  [ ]Family   [ ]Team     Pain:  [ ]yes [x ]no  QOL impact -   Location -                    Aggravating factors -  Quality -  Radiation -  Timing-  Severity (0-10 scale):  Minimal acceptable level (0-10 scale):     Dyspnea:                           [ ]Mild [ ]Moderate [ ]Severe  Anxiety:                             [ ]Mild [ ]Moderate [ ]Severe  Fatigue:                             [ ]Mild [ ]Moderate [ ]Severe  Nausea:                             [ ]Mild [ ]Moderate [ ]Severe  Loss of appetite:              [ ]Mild [ ]Moderate [ ]Severe  Constipation:                    [ ]Mild [ ]Moderate [ ]Severe    PAIN AD Score:	  [email or web address] (Ctrl + left click to view)    Other Symptoms:  [x ]All other review of systems negative     Palliative Performance Status Version 2:         80%      http://npcrc.org/files/news/palliative_performance_scale_ppsv2.pdf  PHYSICAL EXAM:  Vital Signs Last 24 Hrs  T(C): 36.8 (16 Dec 2020 12:40), Max: 37.3 (15 Dec 2020 16:32)  T(F): 98.2 (16 Dec 2020 12:40), Max: 99.1 (15 Dec 2020 16:32)  HR: 118 (16 Dec 2020 12:40) (96 - 118)  BP: 118/83 (16 Dec 2020 12:40) (101/68 - 120/83)  BP(mean): --  RR: 18 (16 Dec 2020 12:40) (18 - 18)  SpO2: 99% (16 Dec 2020 12:40) (96% - 99%)     I&O's Summary     GENERAL:  [x ]Alert  [x ]Oriented x 3   [ ]Lethargic  [ ]Cachexia  [ ]Unarousable  [ x]Verbal  [ ]Non-Verbal  Behavioral:   [ ]Anxiety  [ ]Delirium [ ]Agitation [ x]Other: calm  HEENT:  [x ]Normal   [ ]Dry mouth   [ ]ET Tube/Trach  [ ]Oral lesions  PULMONARY:   [x ]Clear [ ]Tachypnea  [ ]Audible excessive secretions   [ ]Rhonchi        [ ]Right [ ]Left [ ]Bilateral  [ ]Crackles        [ ]Right [ ]Left [ ]Bilateral  [ ]Wheezing     [ ]Right [ ]Left [ ]Bilateral  [ ]Diminished BS [ ] Right [ ]Left [ ]Bilateral  CARDIOVASCULAR:    [x ]Regular [ ]Irregular [ ]Tachy  [ ]Dev [ ]Murmur [ ]Other  GASTROINTESTINAL:  [x ]Soft  [ x]Distended   [ ]+BS  [ ]Non tender [ ]Tender  [ ]PEG [ ]OGT/ NGT   Last BM:      GENITOURINARY:  [x ]Normal [ ]Incontinent   [ ]Oliguria/Anuria   [ ]Schneider  MUSCULOSKELETAL:   [ ]Normal   [x ]Weakness  [ ]Bed/Wheelchair bound [ ]Edema  NEUROLOGIC:   [x ]No focal deficits  [ ] Cognitive impairment  [ ] Dysphagia [ ]Dysarthria [ ] Paresis [ ]Other   SKIN:   [ ]Normal  [ ]Rash   [ ]Pressure ulcer(s) [ ]y [ ]n present on admission  [X] Surgical incision     CRITICAL CARE:  [ ]Shock Present  [ ]Septic [ ]Cardiogenic [ ]Neurologic [ ]Hypovolemic  [ ]Vasopressors [ ]Inotropes  [ ]Respiratory failure present [ ]Mechanical Ventilation [ ]Non-invasive ventilatory support [ ]High-Flow  [ ]Acute  [ ]Chronic [ ]Hypoxic  [ ]Hypercarbic [ ]Other  [ ]Other organ failure     LABS:      RADIOLOGY & ADDITIONAL STUDIES:  reviewed    Protein Calorie Malnutrition Present: [ ]mild [ ]moderate [ ]severe [ ]underweight [ ]morbid obesity  https://www.andeal.org/rory/2440/web/files/ONC/Table_Clinical%20Characteristics%20to%20Document%20Malnutrition-White%20JV%20et%20al%164011.pdf    Height (cm): 170.2 (12-06-20 @ 12:31), 170.2 (09-07-20 @ 18:35), 170.18 (08-28-20 @ 01:37)  Weight (kg): 75 (12-06-20 @ 12:39), 80 (09-10-20 @ 13:40), 79.8 (08-28-20 @ 01:37)  BMI (kg/m2): 25.9 (12-06-20 @ 12:39), 27.6 (12-06-20 @ 12:31), 27.6 (09-10-20 @ 13:40)    [ ]PPSV2 < or = 30%  [ ]significant weight loss [ ]poor nutritional intake [ ]anasarca   Albumin, Serum: 2.4 g/dL (12-14-20 @ 10:43)   [ ]Artificial Nutrition    REFERRALS:   [ ]Chaplaincy  [ ]Hospice  [ ]Child Life  [ ]Social Work  [ ]Case management [ ]Holistic Therapy     Goals of Care Document:

## 2020-12-16 NOTE — PROGRESS NOTE ADULT - REASON FOR ADMISSION
malignant  ascites

## 2020-12-16 NOTE — PROGRESS NOTE ADULT - ATTENDING COMMENTS
I have seen and evaluated this patient independently and with CARMEN Lee and agree with the above findings: Pt to be discharged home today with tramadol for pain, discussed the need to use laxatives to prevent OIC, explained if pt had continued nausea issues and or increased pain to seek medical attention and possibly could benefit from steroids. At this time pt reports feeling much better and has not used any PRNs. Pt to follow up with Dr. Vital and hospice will make contact with patient to explain services in the home. Pt discharge home today.  Total attending time 35min
I have seen and evaluated this patient independently and with NP fellow and agree with the above findings: 60 year old female with PMH breast cancer s/p mastectomy/reconstruction, chemo with good response subsequent dx of uterine Ca s/p chemo and radiation admitted with abd distension, dyspnea, nausea and weight loss. Pt is now s/p paracentesis x 2 on this admission, reports feeling a lot better but still not eating much, feeling weak. Pt also complaining of some back pain relieved by pain meds, this is her first issue with pain per her report. We discussed what palliative care is and what our role is in her care. Explained medication regimen for symptom relief and importance of bowel regimen, nutritional intake. Pt stated she was trying and hopes to get better and get more chemo "maybe next year." Explored with patient if she had ever discussed with her daughter or  her goals of care should her disease get worse or she lose the ability to make her wishes known. She stated she tried to talk to them in October when she was having some issues and they told her "not to think about the bad stuff." We discussed advance care plans briefly and she wanted to discuss with her daughter and elected her daughter Maribel her HCP as her  is often in [place] and not available. Pt recognizes that she is weak and needs some help, I explained that hospice could be an option to get her help at home and that if she did get better/stronger enough to go back for more chemo she could go off hospice program to do so. Hospice referral requested.   Total attending time 70 min

## 2020-12-16 NOTE — PROGRESS NOTE ADULT - SUBJECTIVE AND OBJECTIVE BOX
sitting up in chair    Vital Signs Last 24 Hrs  T(C): 36.8 (16 Dec 2020 06:07), Max: 37.3 (15 Dec 2020 16:32)  T(F): 98.3 (16 Dec 2020 06:07), Max: 99.1 (15 Dec 2020 16:32)  HR: 96 (16 Dec 2020 06:07) (96 - 102)  BP: 110/75 (16 Dec 2020 06:07) (101/68 - 120/83)  BP(mean): --  RR: 18 (16 Dec 2020 06:07) (18 - 18)  SpO2: 98% (16 Dec 2020 06:07) (96% - 98%)    PHYSICAL EXAM:    general - AAO x 3  HEENT - No Icterus  CVS - RRR  RS - AE B/L  Abd - soft, NT  Ext - Pulses +        LABS:        TPro  5.7<L>  /  Alb  2.4<L>  /  TBili  x   /  DBili  x   /  AST  x   /  ALT  x   /  AlkPhos  x   12-14          Culture - Body Fluid with Gram Stain (collected 07 Dec 2020 15:20)  Source: Ascites Fl ascites s/p paracentesis  Gram Stain (12 Dec 2020 10:49):    polymorphonuclear leukocytes seen    No organisms seen    by cytocentrifuge  Final Report (12 Dec 2020 10:49):    No growth at 5 days        RADIOLOGY & ADDITIONAL STUDIES:

## 2020-12-16 NOTE — PROGRESS NOTE ADULT - PROBLEM SELECTOR PLAN 1
Upon evaluation this morning patient reports no pain but this morning she did have some mild abdominal pain with a severity of 4 that was alleviated with current pain regimen. Patient is currently ordered for Tramadol 25mg PO TID along with Dilaudid 2mg IVP q3h PRN for severe pain. Would recommend Tramadol 25mg PO TID for mild pain, Tramadol 50mg PO PRN q4h for moderate pain, and Morphine 2mg IVP q4h PRN for severe pain. Continue with bowel regimen to prevent constipation in the use of opiates. If pain continues to worsen patient may benefit from Decadron 2mg PO BID.   Patient has not required any PRNs in the last 24 hours. Upon evaluation this morning patient reports no pain but this morning she did have some mild abdominal pain with a severity of 4 that was alleviated with current pain regimen. Patient is currently ordered Tramadol 25mg PO TID for mild pain, Tramadol 50mg PO PRN q4h for moderate pain, and Morphine 2mg IVP q4h PRN for severe pain. Continue with bowel regimen to prevent constipation in the use of opiates. If pain continues to worsen patient may benefit from Decadron 2mg PO BID.   Patient has not required any PRNs in the last 24 hours.

## 2020-12-16 NOTE — PROGRESS NOTE ADULT - PROBLEM SELECTOR PLAN 3
Patient report having no nausea/vomiting in the last 24 hours. If patient reports nausea/vomiting would recommend patient receives Zofran 4mg IVP q6h PRN.  Patient has not required any PRNs in the last 24 hours.

## 2020-12-16 NOTE — PROGRESS NOTE ADULT - NSHPATTENDINGPLANDISCUSS_GEN_ALL_CORE
Patient/IR
oncology/ patient/ team.
patient./ .
patient/ 
patient/ oncology.
patient/ team
patient/ team.
patient/ team.
patient/ team/ase management./ socaiul worker
IDT
IDT

## 2020-12-18 DIAGNOSIS — E43 UNSPECIFIED SEVERE PROTEIN-CALORIE MALNUTRITION: ICD-10-CM

## 2020-12-18 DIAGNOSIS — K21.9 GASTRO-ESOPHAGEAL REFLUX DISEASE WITHOUT ESOPHAGITIS: ICD-10-CM

## 2020-12-18 DIAGNOSIS — C55 MALIGNANT NEOPLASM OF UTERUS, PART UNSPECIFIED: ICD-10-CM

## 2020-12-18 DIAGNOSIS — E87.6 HYPOKALEMIA: ICD-10-CM

## 2020-12-18 DIAGNOSIS — C78.89 SECONDARY MALIGNANT NEOPLASM OF OTHER DIGESTIVE ORGANS: ICD-10-CM

## 2020-12-18 DIAGNOSIS — R18.0 MALIGNANT ASCITES: ICD-10-CM

## 2020-12-18 DIAGNOSIS — C78.7 SECONDARY MALIGNANT NEOPLASM OF LIVER AND INTRAHEPATIC BILE DUCT: ICD-10-CM

## 2020-12-18 DIAGNOSIS — I10 ESSENTIAL (PRIMARY) HYPERTENSION: ICD-10-CM

## 2020-12-18 DIAGNOSIS — G47.33 OBSTRUCTIVE SLEEP APNEA (ADULT) (PEDIATRIC): ICD-10-CM

## 2020-12-18 DIAGNOSIS — D63.0 ANEMIA IN NEOPLASTIC DISEASE: ICD-10-CM

## 2021-01-14 NOTE — DISCHARGE NOTE PROVIDER - DISCHARGE DATE
31-Aug-2020 Topical Steroids Counseling: I discussed with the patient that prolonged use of topical steroids can result in the increased appearance of superficial blood vessels (telangiectasias), lightening (hypopigmentation) and thinning of the skin (atrophy).  Patient understands to avoid using high potency steroids in skin folds, the groin or the face.  The patient verbalized understanding of the proper use and possible adverse effects of topical steroids.  All of the patient's questions and concerns were addressed. Detail Level: Zone

## 2021-01-30 ENCOUNTER — EMERGENCY (EMERGENCY)
Facility: HOSPITAL | Age: 61
LOS: 0 days | Discharge: HOME | End: 2021-01-31
Attending: STUDENT IN AN ORGANIZED HEALTH CARE EDUCATION/TRAINING PROGRAM
Payer: MEDICAID

## 2021-01-30 VITALS
OXYGEN SATURATION: 97 % | RESPIRATION RATE: 16 BRPM | DIASTOLIC BLOOD PRESSURE: 72 MMHG | HEIGHT: 67 IN | TEMPERATURE: 97 F | WEIGHT: 130.07 LBS | HEART RATE: 106 BPM | SYSTOLIC BLOOD PRESSURE: 117 MMHG

## 2021-01-30 DIAGNOSIS — J30.2 OTHER SEASONAL ALLERGIC RHINITIS: ICD-10-CM

## 2021-01-30 DIAGNOSIS — C54.1 MALIGNANT NEOPLASM OF ENDOMETRIUM: ICD-10-CM

## 2021-01-30 DIAGNOSIS — R41.82 ALTERED MENTAL STATUS, UNSPECIFIED: ICD-10-CM

## 2021-01-30 DIAGNOSIS — S89.90XA UNSPECIFIED INJURY OF UNSPECIFIED LOWER LEG, INITIAL ENCOUNTER: Chronic | ICD-10-CM

## 2021-01-30 DIAGNOSIS — D64.9 ANEMIA, UNSPECIFIED: ICD-10-CM

## 2021-01-30 DIAGNOSIS — Z90.13 ACQUIRED ABSENCE OF BILATERAL BREASTS AND NIPPLES: Chronic | ICD-10-CM

## 2021-01-30 DIAGNOSIS — I10 ESSENTIAL (PRIMARY) HYPERTENSION: ICD-10-CM

## 2021-01-30 DIAGNOSIS — Z98.890 OTHER SPECIFIED POSTPROCEDURAL STATES: Chronic | ICD-10-CM

## 2021-01-30 DIAGNOSIS — C79.9 SECONDARY MALIGNANT NEOPLASM OF UNSPECIFIED SITE: ICD-10-CM

## 2021-01-30 DIAGNOSIS — G47.30 SLEEP APNEA, UNSPECIFIED: ICD-10-CM

## 2021-01-30 DIAGNOSIS — K21.9 GASTRO-ESOPHAGEAL REFLUX DISEASE WITHOUT ESOPHAGITIS: ICD-10-CM

## 2021-01-30 DIAGNOSIS — Z90.710 ACQUIRED ABSENCE OF BOTH CERVIX AND UTERUS: Chronic | ICD-10-CM

## 2021-01-30 LAB
ALBUMIN SERPL ELPH-MCNC: 1.2 G/DL — LOW (ref 3.3–5)
ALP SERPL-CCNC: 310 U/L — HIGH (ref 40–120)
ALT FLD-CCNC: 23 U/L — SIGNIFICANT CHANGE UP (ref 12–78)
ANION GAP SERPL CALC-SCNC: 9 MMOL/L — SIGNIFICANT CHANGE UP (ref 5–17)
ANISOCYTOSIS BLD QL: SIGNIFICANT CHANGE UP
AST SERPL-CCNC: 67 U/L — HIGH (ref 15–37)
BASOPHILS # BLD AUTO: 0 K/UL — SIGNIFICANT CHANGE UP (ref 0–0.2)
BASOPHILS NFR BLD AUTO: 0 % — SIGNIFICANT CHANGE UP (ref 0–2)
BILIRUB SERPL-MCNC: 0.5 MG/DL — SIGNIFICANT CHANGE UP (ref 0.2–1.2)
BLD GP AB SCN SERPL QL: SIGNIFICANT CHANGE UP
BUN SERPL-MCNC: 90 MG/DL — HIGH (ref 7–23)
CALCIUM SERPL-MCNC: 8.9 MG/DL — SIGNIFICANT CHANGE UP (ref 8.5–10.1)
CHLORIDE SERPL-SCNC: 92 MMOL/L — LOW (ref 96–108)
CO2 SERPL-SCNC: 27 MMOL/L — SIGNIFICANT CHANGE UP (ref 22–31)
CREAT SERPL-MCNC: 2.4 MG/DL — HIGH (ref 0.5–1.3)
EOSINOPHIL # BLD AUTO: 0 K/UL — SIGNIFICANT CHANGE UP (ref 0–0.5)
EOSINOPHIL NFR BLD AUTO: 0 % — SIGNIFICANT CHANGE UP (ref 0–6)
GLUCOSE BLDC GLUCOMTR-MCNC: 125 MG/DL — HIGH (ref 70–99)
GLUCOSE SERPL-MCNC: 117 MG/DL — HIGH (ref 70–99)
HCT VFR BLD CALC: 21.1 % — LOW (ref 34.5–45)
HGB BLD-MCNC: 6.9 G/DL — CRITICAL LOW (ref 11.5–15.5)
HYPOCHROMIA BLD QL: SIGNIFICANT CHANGE UP
LG PLATELETS BLD QL AUTO: SIGNIFICANT CHANGE UP
LYMPHOCYTES # BLD AUTO: 1.12 K/UL — SIGNIFICANT CHANGE UP (ref 1–3.3)
LYMPHOCYTES # BLD AUTO: 9 % — LOW (ref 13–44)
MACROCYTES BLD QL: SIGNIFICANT CHANGE UP
MANUAL SMEAR VERIFICATION: SIGNIFICANT CHANGE UP
MCHC RBC-ENTMCNC: 28.4 PG — SIGNIFICANT CHANGE UP (ref 27–34)
MCHC RBC-ENTMCNC: 32.7 GM/DL — SIGNIFICANT CHANGE UP (ref 32–36)
MCV RBC AUTO: 86.8 FL — SIGNIFICANT CHANGE UP (ref 80–100)
MONOCYTES # BLD AUTO: 1 K/UL — HIGH (ref 0–0.9)
MONOCYTES NFR BLD AUTO: 8 % — SIGNIFICANT CHANGE UP (ref 2–14)
NEUTROPHILS # BLD AUTO: 10.33 K/UL — HIGH (ref 1.8–7.4)
NEUTROPHILS NFR BLD AUTO: 83 % — HIGH (ref 43–77)
NRBC # BLD: 0 /100 — SIGNIFICANT CHANGE UP (ref 0–0)
NRBC # BLD: SIGNIFICANT CHANGE UP /100 WBCS (ref 0–0)
OB PNL STL: POSITIVE
PLAT MORPH BLD: NORMAL — SIGNIFICANT CHANGE UP
PLATELET # BLD AUTO: 92 K/UL — LOW (ref 150–400)
POLYCHROMASIA BLD QL SMEAR: SLIGHT — SIGNIFICANT CHANGE UP
POTASSIUM SERPL-MCNC: 5.1 MMOL/L — SIGNIFICANT CHANGE UP (ref 3.5–5.3)
POTASSIUM SERPL-SCNC: 5.1 MMOL/L — SIGNIFICANT CHANGE UP (ref 3.5–5.3)
PROT SERPL-MCNC: 6.5 GM/DL — SIGNIFICANT CHANGE UP (ref 6–8.3)
RBC # BLD: 2.43 M/UL — LOW (ref 3.8–5.2)
RBC # FLD: 18.6 % — HIGH (ref 10.3–14.5)
RBC BLD AUTO: ABNORMAL
SODIUM SERPL-SCNC: 128 MMOL/L — LOW (ref 135–145)
WBC # BLD: 12.44 K/UL — HIGH (ref 3.8–10.5)
WBC # FLD AUTO: 12.44 K/UL — HIGH (ref 3.8–10.5)

## 2021-01-30 PROCEDURE — 72170 X-RAY EXAM OF PELVIS: CPT | Mod: 26

## 2021-01-30 PROCEDURE — 99285 EMERGENCY DEPT VISIT HI MDM: CPT

## 2021-01-30 PROCEDURE — 72125 CT NECK SPINE W/O DYE: CPT | Mod: 26

## 2021-01-30 PROCEDURE — 70450 CT HEAD/BRAIN W/O DYE: CPT | Mod: 26

## 2021-01-30 PROCEDURE — 93010 ELECTROCARDIOGRAM REPORT: CPT

## 2021-01-30 PROCEDURE — 71045 X-RAY EXAM CHEST 1 VIEW: CPT | Mod: 26

## 2021-01-30 RX ORDER — ACETAMINOPHEN 500 MG
650 TABLET ORAL ONCE
Refills: 0 | Status: COMPLETED | OUTPATIENT
Start: 2021-01-30 | End: 2021-01-30

## 2021-01-30 NOTE — ED PROVIDER NOTE - CLINICAL SUMMARY MEDICAL DECISION MAKING FREE TEXT BOX
pt presents today s/p fall, found on the fall, h/o endometrial cancer with mets, found to be anemic on labs, 1unit prbc ordered, ct and xray pending, case signed out to the oncoming physician

## 2021-01-30 NOTE — ED ADULT NURSE NOTE - ED STAT RN HANDOFF DETAILS
Report endorsed to oncoming MADISON Buck. Safety checks completed this shift. Safety rounds completed hourly.  IV sites checked Q2+remains WDL. Medications administered as ordered with no signs/symptoms of adverse reactions. Fall & skin precautions in place. Any issues endorsed to oncoming RN for follow up.

## 2021-01-30 NOTE — ED ADULT NURSE REASSESSMENT NOTE - NS ED NURSE REASSESS COMMENT FT1
15 minutes into transfusion- pt is stable w no acute distress noted. no transfusion adverse reactions noted upon assessment. VS stable and in pt baseline. Will cont to monitor.

## 2021-01-30 NOTE — ED ADULT NURSE REASSESSMENT NOTE - NS ED NURSE REASSESS COMMENT FT1
Pt received at bed 6, nonverbal, awake. Unable to respond to questions. Pt noted with accessed R chest port from previous RN Ivonne. Pt placed on NRB 10ml due to low O2 sat. Performed dysphagia screen for PO meds, pt failed. Dr. Geiger made aware.

## 2021-01-30 NOTE — ED PROVIDER NOTE - CARE PLAN
Principal Discharge DX:	Endometrial cancer  Secondary Diagnosis:	Metastasis  Secondary Diagnosis:	Anemia

## 2021-01-30 NOTE — ED ADULT NURSE NOTE - PSH
Knee injury  Left & had surgery about 30 years ago  S/P bilateral mastectomy    S/P breast reconstruction, bilateral    S/P hysterectomy

## 2021-01-30 NOTE — ED ADULT NURSE NOTE - OBJECTIVE STATEMENT
pt received awake, alert, pt able to answer yes or no questions by shaking/ nodding head.  pt speaks softly. pt c/o thirsty, cold, and headache. RLE swelling noted. right chest wall infusaport un-accessed note

## 2021-01-30 NOTE — ED PROVIDER NOTE - OBJECTIVE STATEMENT
60 year old female with h/o endometrial cancer (diagnosed 8/2019) with mets on home hospice presents today biba for AMS, spoke to pt's daughter who states that she found her on the floor on her back at 6:3am, it appeared as if she was just lying there but pt is nonambulatory and most likely fell, she was unable to pick her up so she was on the floor for two hours until a friend assisted her back onto the bed, by 9:30am, she found that she was less responsive to verbal and tactile stimuli, she attempted to call hospice three times, when she did not hear back she decided to call EMS

## 2021-01-30 NOTE — ED ADULT TRIAGE NOTE - CHIEF COMPLAINT QUOTE
brought by ems for altered mental status . history of metastatic cancer , lever, breast stomach and lung

## 2021-01-30 NOTE — ED ADULT NURSE NOTE - NSIMPLEMENTINTERV_GEN_ALL_ED
Implemented All Fall Risk Interventions:  [place] to call system. Call bell, personal items and telephone within reach. Instruct patient to call for assistance. Room bathroom lighting operational. Non-slip footwear when patient is off stretcher. Physically safe environment: no spills, clutter or unnecessary equipment. Stretcher in lowest position, wheels locked, appropriate side rails in place. Provide visual cue, wrist band, yellow gown, etc. Monitor gait and stability. Monitor for mental status changes and reorient to person, place, and time. Review medications for side effects contributing to fall risk. Reinforce activity limits and safety measures with patient and family.

## 2021-01-30 NOTE — ED ADULT NURSE REASSESSMENT NOTE - NS ED NURSE REASSESS COMMENT FT1
Covering breakfor primary RN. After complete infusion of 1 unit of PRBCs, No blood transfusion reaction noted, on cardiac monitor, no acute distress noted, will continue to monitor.

## 2021-01-30 NOTE — ED PROVIDER NOTE - PROGRESS NOTE DETAILS
spoke to pt's daughter a few times, made aware of her hgb, telephone consent obtained for 1u prbc, pt pending ct and xray at this time Pt signed out by Dr. Geiger as pending blood transfusion, and then d/c home for home hospice. Pt got blood transfusion, and is ready for d/c home.

## 2021-01-30 NOTE — ED ADULT NURSE REASSESSMENT NOTE - NS ED NURSE REASSESS COMMENT FT1
Pt transfusion 1U of PRBC initiated at 2228, to be infused for 1 hour through R chest port. Pt unable to verbalize consent for self. Blood consent for transfusion in pt chart, obtained from daughter by Dr. Geiger. MADISON Alejandro acted as witness to consent. Will continue to monitor pt for any adverse reactions during period of transfusion. Pt transfusion 1U of PRBC initiated at 2228, to be infused for 1 hour as per Dr Geiger orders through R chest port. Pt unable to verbalize consent for self. Blood consent for transfusion in pt chart, obtained from daughter by Dr. Geiger. MADISON Alejandro acted as witness to consent. Will continue to monitor pt for any adverse reactions during period of transfusion.

## 2021-01-31 VITALS
HEART RATE: 110 BPM | SYSTOLIC BLOOD PRESSURE: 116 MMHG | DIASTOLIC BLOOD PRESSURE: 78 MMHG | RESPIRATION RATE: 22 BRPM | OXYGEN SATURATION: 100 %

## 2021-01-31 RX ORDER — OXYCODONE AND ACETAMINOPHEN 5; 325 MG/1; MG/1
1 TABLET ORAL ONCE
Refills: 0 | Status: DISCONTINUED | OUTPATIENT
Start: 2021-01-31 | End: 2021-01-31

## 2021-01-31 RX ADMIN — OXYCODONE AND ACETAMINOPHEN 1 TABLET(S): 5; 325 TABLET ORAL at 09:16

## 2021-07-02 NOTE — DIETITIAN INITIAL EVALUATION ADULT. - FEEDING SKILL
[employer] Application   Below Knee    NAME:  Maicol Prince OF BIRTH:  [id number]  MEDICAL RECORD NUMBER:  [id number]  DATE:  7/2/2021    Remove old [employer] or Boots. Applied moisturizing agent to dry skin as needed. Appied primary and secondary dressing as ordered. Applied Unna roll from toes to knee overlapping each time. Applied ace wrap or coban from toes to below the knee. Secured with tape and/or metal clips covered with tape. Instructed patient/caregiver to keep dressing dry and intact. DO NOT REMOVE DRESSING. Instructed pt/family/caregiver to report excessive draining, loose bandage, wet dressing, severe pain or tingling in toes. Applied [employer] dressing below the knee to bilateral lower legs. Unna Boot(s) were applied per  Guidelines.     Response to treatment: Well tolerated by patient      Electronically signed by Elver Ray RN on 7/2/2021 at 2:31 PM independent

## 2021-09-08 NOTE — ED ADULT NURSE NOTE - TEMPLATE LIST FOR HEAD TO TOE ASSESSMENT
ED Physician Assistant Note      Patient : Nader Lim Age: 26 year old Sex: male   MRN: [id number] Encounter Date: 9/7/2021      Chief Complaint   Patient presents with   • Chest Pain Adult   • Abdominal Pain             History     HPI      This is a 26-year-old male with no significant past medical history who presents for evaluation of lower abdominal pain, and left-sided chest pain.  Patient was seen in immediate care prior to ER and sent for further evaluation.  Chest pain symptoms  started approximately 5 days ago, intermittent, left-sided, radiates to left arm.  Notes lower abdominal pain diffusely, has been having normal bowel movements, last this afternoon.  Has been taking p.o. Tums without improvement.  No diarrhea, constipation, dysuria, flank pain .  Chest pain is more prominent with activity, denies nausea, vomiting, fever, cough, shortness of breath, known sick contacts.    No Known Allergies    Past Medical History:   Diagnosis Date   • No pertinent past medical history        Past Surgical History:   Procedure Laterality Date   • NO PAST SURGERIES         Family History   Problem Relation Age of Onset   • Patient is unaware of any medical problems Mother    • Patient is unaware of any medical problems Father        Social History     Tobacco Use   • Smoking status: Never Smoker   • Smokeless tobacco: Never Used   Substance Use Topics   • Alcohol use: Yes   • Drug use: Never       Review of Systems   Constitutional: Negative for chills and fever.   HENT: Negative for ear pain and sore throat.    Eyes: Negative for discharge and visual disturbance.   Respiratory: Negative for cough, shortness of breath and wheezing.    Cardiovascular: Positive for chest pain (intermittent left sided, radiates to left arm). Negative for leg swelling.   Gastrointestinal: Positive for abdominal pain (lower). Negative for constipation, diarrhea, nausea and vomiting.   Genitourinary: Negative for dysuria and flank  pain.   Musculoskeletal: Negative for neck pain and neck stiffness.   Skin: Negative for pallor and rash.   Neurological: Negative for dizziness and headaches.   Psychiatric/Behavioral: Negative for hallucinations and suicidal ideas.        Physical Exam     ED Triage Vitals [09/07/21 2243]   ED Triage Vitals Group      Temp 98.1 °F (36.7 °C)      Heart Rate 65      Resp 16      /84      SpO2 99 %      EtCO2 mmHg       Height 5' 8\" (1.727 m)      Weight 190 lb (86.2 kg)      Weight Scale Used ED Estimate      BMI (Calculated) 28.89      IBW/kg (Calculated) 68.4       Visit Vitals  /62   Pulse 68   Temp 98.1 °F (36.7 °C)   Resp 14   Ht 5' 8\" (1.727 m)   Wt 86.2 kg (190 lb)   SpO2 95%   BMI 28.89 kg/m²      Patients pulse Ox is 100 % on RA which is normal    Physical Exam  Vitals and nursing note reviewed.   Constitutional:       General: He is not in acute distress.     Appearance: He is not toxic-appearing.   HENT:      Head: Normocephalic and atraumatic.      Mouth/Throat:      Mouth: Mucous membranes are moist.      Pharynx: No oropharyngeal exudate.   Eyes:      Conjunctiva/sclera: Conjunctivae normal.      Pupils: Pupils are equal, round, and reactive to light.   Cardiovascular:      Rate and Rhythm: Normal rate and regular rhythm.      Heart sounds: Normal heart sounds. No murmur heard.     Pulmonary:      Effort: Pulmonary effort is normal.      Breath sounds: No decreased breath sounds, wheezing, rhonchi or rales.   Abdominal:      General: Bowel sounds are normal. There is no distension.      Palpations: Abdomen is soft.      Tenderness: There is abdominal tenderness in the suprapubic area and left lower quadrant. There is no right CVA tenderness, left CVA tenderness or guarding. Negative signs include Shane's sign and McBurney's sign.      Hernia: No hernia is present.      Comments: Mild to moderate left lower quadrant tenderness, no guarding, no rigidity, no rebound tenderness, no CVA  tenderness.   Musculoskeletal:         General: No tenderness or deformity.      Cervical back: Neck supple. No rigidity.   Skin:     General: Skin is warm and dry.      Capillary Refill: Capillary refill takes less than 2 seconds.      Findings: No rash.   Neurological:      General: No focal deficit present.      Mental Status: He is alert and oriented to person, place, and time.   Psychiatric:         Mood and Affect: Mood normal.         Behavior: Behavior normal.           ED Course     Procedures    Lab Results     Results for orders placed or performed during the hospital encounter of 09/07/21   Comprehensive Metabolic Panel   Result Value Ref Range    Fasting Status      Sodium 140 135 - 145 mmol/L    Potassium 3.7 3.4 - 5.1 mmol/L    Chloride 103 98 - 107 mmol/L    Carbon Dioxide 26 21 - 32 mmol/L    Anion Gap 15 10 - 20 mmol/L    Glucose 92 65 - 99 mg/dL    BUN 13 6 - 20 mg/dL    Creatinine 0.72 0.67 - 1.17 mg/dL    Glomerular Filtration Rate >90 >=60    BUN/ Creatinine Ratio 18 7 - 25    Calcium 9.4 8.4 - 10.2 mg/dL    Bilirubin, Total 0.5 0.2 - 1.0 mg/dL    GOT/AST 42 (H) <=37 Units/L    GPT/ (H) <64 Units/L    Alkaline Phosphatase 89 45 - 117 Units/L    Albumin 3.9 3.6 - 5.1 g/dL    Protein, Total 7.6 6.4 - 8.2 g/dL    Globulin 3.7 2.0 - 4.0 g/dL    A/G Ratio 1.1 1.0 - 2.4   Lipase   Result Value Ref Range    Lipase 79 73 - 393 Units/L   Troponin I Ultra Sensitive   Result Value Ref Range    Troponin I, Ultra Sensitive <0.02 <=0.04 ng/mL   CBC with Automated Differential (performable only)   Result Value Ref Range    WBC 7.7 4.2 - 11.0 K/mcL    RBC 4.81 4.50 - 5.90 mil/mcL    HGB 15.1 13.0 - 17.0 g/dL    HCT 43.9 39.0 - 51.0 %    MCV 91.3 78.0 - 100.0 fl    MCH 31.4 26.0 - 34.0 pg    MCHC 34.4 32.0 - 36.5 g/dL    RDW-CV 13.6 11.0 - 15.0 %    RDW-SD 46.1 39.0 - 50.0 fL     140 - 450 K/mcL    NRBC 0 <=0 /100 WBC    Neutrophil, Percent 50 %    Lymphocytes, Percent 36 %    Mono, Percent 11 %     Eosinophils, Percent 2 %    Basophils, Percent 1 %    Immature Granulocytes 0 %    Absolute Neutrophils 3.9 1.8 - 7.7 K/mcL    Absolute Lymphocytes 2.8 1.0 - 4.8 K/mcL    Absolute Monocytes 0.8 0.3 - 0.9 K/mcL    Absolute Eosinophils  0.1 0.0 - 0.5 K/mcL    Absolute Basophils 0.1 0.0 - 0.3 K/mcL    Absolute Immmature Granulocytes 0.0 0.0 - 0.2 K/mcL   2019 Novel Coronavirus (SARS-CoV-2)   Result Value Ref Range    SARS-CoV-2 by PCR Not Detected Not Detected / Detected / Inhibitor Present    Isolation Guidelines       Do not use this test result as the sole decision-maker for discontinuation of isolation.   Clinical evaluation should be considered for other respiratory illness requiring transmission-based isolation.    -    No fever (<99.0 F/37.2 C) for at least 24 hours without the use of fever-reducing medications    AND  -    Respiratory symptoms have improved or resolved (e.g. cough, shortness of breath)     AND  -    COVID-19 negative test    See COVID-19 Deisolation Resource Guide      Procedural Notes       Negative for SARS-CoV-2 (2019-nCoV) nucleic acid in the specimen, consider other viruses.    A negative result does not preclude Coronavirus (COVID-19) infection and  should not be used as sole basis for treatment or patient management decisions.    Negative results must be combined with clinical observations, patient history, and epidemiological information.    This test is an [facility] LDT/EUA validated assay, based on TMA or real-time PCR methodology intended for the qualitative detection of SARS-CoV-2 (2019-nCoV) nucleic acid. Performance characteristics for the LDT/EUA test has been determined by [employer] and are incorporated as part of FDA Emergency Use Authorization (EUA).    This test was performed by [facility] using the FDA cleared Emergency Use Authorization (EUA) Aptima SARS-CoV-2 (2019-nCoV) from [employer]. This laboratory is certified under the Clinical Laboratory Improvement Amendments (CLIA) as  qualified to perform high complexity clinical laboratory testing.         EKG Results   EKG Interpretation  Time 2243  Rate: 64bpm  Rhythm: Sinus rhythm  Abnormality:No    EKG tracing interpreted by ED physician/.me    Rhythm Strip Interpretation by ED physician/.me  Rate: 64bpm  Rhythm: normal Sinus Rhythm      Radiology Results     CT abdomen pelvis with IV contrast  Impression:  CT abdomen pelvis.  No evidence of acute intra-abdominal or intrapelvic abnormality.  Appendix not identified but no evidence of acute appendicitis.  Unremarkable small bowel and large bowel.  Unremarkable liver, gallbladder, stomach, spleen, pancreas, adrenal glands kidneys and aorta.  No ascites or pneumoperitoneum.    ED Medication Orders (From admission, onward)    Ordered Start     Status Ordering Provider    09/08/21 0013 09/08/21 0015  famotidine (PEPCID) injection 20 mg  ONCE         Last MAR action: Given RIGO ZIMMERMAN          ED Course as of Sep 09 0044   Wed Sep 08, 2021   0240 ALT/SGPT(!): 103 [WS]      ED Course User Index  [WS] Rigo Zimmerman PA-C       Multiple diagnoses were considered including,: Appendicitis, Mesenteric Adenitis, Diverticulitis,  Small Bowel Obstruction, Colitis, Crohns Disease, Irritable Bowel Syndrome, Ulcerative Colitis,  Enteritis, Mesenteric Ischemia, Abdominal Aortic Aneurysm, Renal Artery Infarction, Urethritis, Cystitis, Urinary Retention,  Benign Prostatic Hypertrophy, Prostate Cancer, Urethritis, STD, Ureteral Lithiasis, Nephrolithiasis, Hydronephrosis, Renal Colic,  Based on the workup and exam, I feel the patients symptoms are most consistent with left lower quadrant abdominal pain, noncardiac chest pain,      MDM   History and physical exam as above  Afebrile, nontoxic, no acute distress  Regular rate and rhythm, normal S1-S2, no murmurs, lungs clear to auscultation bilaterally, no wheezes, no rales  Abdomen soft, mild suprapubic, left lower quadrant tenderness, no guarding, no  rebound, no CVA tenderness.  Chest x-ray with no acute cardiopulmonary process  EKG normal sinus rhythm, no ischemic changes  Troponin negative x1  HEART Score Total : 0  Lipase within normal limits 79  Rapid Covid negative  CBC with normal WBC, normal hemoglobin, normal platelet  CMP mild transaminitis, AST 42,  otherwise with no significant abnormalities.  Noted improvement status post IV famotidine in ED  CT abdomen pelvis with IV contrast with no acute intra-abdominal pelvic process.  Patient hemodynamically stable, plan DC home  Follow-up primary care physician in 2 to 3 days for reevaluation  Rx p.o. Bentyl, p.o. Pepcid  All questions answered, patient agrees with plan of care  Return precautions discussed.        The patient was advised that the workup and exam performed today in the emergency room is a limited exam and workup designed to ascertain whether or not an emergent medical condition exists. It is also intended to assess if the patient's condition warrants admission to the hospital for further evaluation and treatment, or if the patient is stable enough to be discharged and managed at home or in an outpatient setting.   The patient has been instructed to follow up with his or her primary care physician or approrpriate specialist within the period of time indicated for further evaluation and treatment as deemed necessary.      Clinical Impression     ED Diagnosis   1. Left sided abdominal pain     2. Non-cardiac chest pain         Disposition          Discharge 9/8/2021  2:39 AM  Nader Lim discharge to home/self care.           Summary of your Discharge Medications      Take these Medications      Details   dicyclomine 20 MG tablet  Commonly known as: BENTYL   Take 1 tablet by mouth 4 times daily as needed (abdominal pain/spasm).     famotidine 20 MG tablet  Commonly known as: Pepcid   Take 1 tablet by mouth 2 times daily for 14 days.              Josh Zimmerman PA-C   9/9/2021 12:14  SHREYA Zimmerman PA-C  09/09/21 0044     General

## 2021-12-06 NOTE — ED PROVIDER NOTE - PATIENT PORTAL LINK FT
With patient's  this morning, and reviewed CE.    Mentation improving per CE, able to take po lactulose, following commands, labs  improving.  They asked when she would be transferred here- no bed available and no ETA per admissions;  Advised them to call us if discharges from [facility] as we will need to set up IR follow up as outpatient ASAP.      Will fax in or email  [facility] paperwork for Susan to me.    You can access the FollowMyHealth Patient Portal offered by [facility] by registering at the following website: [email or web address]. By joining [employer]’s FollowMyHealth portal, you will also be able to view your health information using other applications (apps) compatible with our system.

## 2022-04-17 NOTE — DISCHARGE NOTE PROVIDER - CARE PROVIDERS DIRECT ADDRESSES
,DirectAddress_Unknown,DirectAddress_Unknown ,DirectAddress_Unknown,DirectAddress_Unknown,DirectAddress_Unknown regular

## 2022-07-18 NOTE — ED ADULT NURSE NOTE - DOES PATIENT HAVE ADVANCE DIRECTIVE
[facility] 75  coding opportunities       Chart reviewed, no opportunity found    [id number]        Patients Insurance     Medicare Insurance: [employer] No

## 2023-01-12 NOTE — DISCHARGE NOTE PROVIDER - NSDCHOSPICE_GEN_A_CORE
Pulmonary Consultation    Ami Giordano, *,    Thank you for asking me to see Zhane Traylor for   Chief Complaint   Patient presents with   • Establish Care     Abnormal PFT   • Cough   • Shortness of Breath   .      History of Present Illness  Zhane Traylor is a 25 y.o. female with a PMH significant for bronchial asthma and obesity presents for evaluation patient complains of wheezing off and on along with shortness of breath which is well controlled with Advair twice daily along with albuterol as needed  She does complain of excessive sleepiness during daytime and shortness of breath at night  Patient quit smoking      Tobacco use history:  Former smoker      Review of Systems: History obtained from chart review and the patient.  Review of Systems   Respiratory: Positive for shortness of breath.    All other systems reviewed and are negative.    As described in the HPI. Otherwise, remainder of ROS (14 systems) were negative.    Patient Active Problem List   Diagnosis   • Alcoholism in remission ([facility])   • Allergic rhinitis   • Anxiety disorder   • Asthma   • Borderline diabetes   • Major depression   • Class 3 severe obesity due to excess calories with serious comorbidity and body mass index (BMI) of 40.0 to 44.9 in adult ([facility])   • Injury of left knee   • Left knee pain   • Left anterior cruciate ligament tear   • Closed fracture of left tibial plateau   • Tear of lateral meniscus of left knee   • Acute medial meniscus tear of left knee   • Sprain of medial collateral ligament of left knee   • Acute injury of left anterior cruciate ligament   • Aftercare following surgery of left knee arthroscopic ACL reconstruction with allograft, 2/21/2022   • MPFL tear   • Sprain of medial collateral ligament of right knee   • Primary hypertension   • Tachycardia   • Vitamin D deficiency   • Iron deficiency   • Mild intermittent asthma with exacerbation   • History of alcohol abuse   • Elevated LFTs         Current  Outpatient Medications:   •  albuterol sulfate  (90 Base) MCG/ACT inhaler, Inhale 2 puffs Every 4 (Four) Hours As Needed for Wheezing or Shortness of Air., Disp: 18 g, Rfl: 5  •  amLODIPine (NORVASC) 5 MG tablet, Take 1 tablet by mouth Daily., Disp: 30 tablet, Rfl: 1  •  cetirizine (zyrTEC) 10 MG tablet, Zyrtec 10 mg oral tablet take 1 tablet (10 mg) by oral route once daily   Active, Disp: , Rfl:   •  Ergocalciferol (VITAMIN D2 PO), Take 1 capsule by mouth Daily., Disp: , Rfl:   •  Etonogestrel (Nexplanon) 68 MG implant subdermal implant, Inject 68 mg into the appropriate area of the skin as directed by provider., Disp: , Rfl:   •  Fluticasone-Salmeterol (ADVAIR/WIXELA) 250-50 MCG/ACT DISKUS, Inhale 1 puff 2 (Two) Times a Day. For asthma. Rinse mouth after use, Disp: 60 each, Rfl: 3  •  lamoTRIgine (LaMICtal) 100 MG tablet, Take 1 tablet by mouth twice daily, Disp: 60 tablet, Rfl: 3  •  Latuda 40 MG tablet tablet, Take 1 tablet by mouth every night at bedtime., Disp: 30 tablet, Rfl: 2  •  levalbuterol (XOPENEX HFA) 45 MCG/ACT inhaler, Inhale 1-2 puffs Every 4 (Four) Hours As Needed for Wheezing or Shortness of Air., Disp: 15 g, Rfl: 5  •  LORazepam (ATIVAN) 1 MG tablet, Take 1 tablet by mouth 3 times a day as needed for anxiety, Disp: 90 tablet, Rfl: 0  •  losartan (Cozaar) 50 MG tablet, Take 1 tablet by mouth Daily., Disp: 90 tablet, Rfl: 3  •  meloxicam (MOBIC) 15 MG tablet, 1 tablet by mouth daily, Disp: 90 tablet, Rfl: 0  •  metoprolol succinate XL (Toprol XL) 25 MG 24 hr tablet, Take 1 tablet by mouth Daily., Disp: 30 tablet, Rfl: 2  •  montelukast (Singulair) 10 MG tablet, Take 1 tablet by mouth Every Night., Disp: 30 tablet, Rfl: 5  •  nitrofurantoin, macrocrystal-monohydrate, (MACROBID) 100 MG capsule, Take 1 capsule by mouth 2 (Two) Times a Day., Disp: 20 capsule, Rfl: 0  •  ramelteon (Rozerem) 8 MG tablet, take 1 tablet by mouth at bedtime, Disp: 30 tablet, Rfl: 3  •  Retin-A 0.025 % cream, apply  daily after washing, Disp: 45 g, Rfl: 2  •  spironolactone (ALDACTONE) 25 MG tablet, Take 1 tablet by mouth 3 (Three) Times a Day., Disp: 90 tablet, Rfl: 5  •  tretinoin microspheres (Retin-A Micro) 0.04 % gel, Apply a pea-size amount to face every other day, Disp: 45 g, Rfl: 2    No Known Allergies    Past Medical History:   Diagnosis Date   • Alcoholism in remission (HCC) 2021   • Anxiety disorder 2021   • Asthma    • Constipation    • Encounter for blood transfusion    • H/O psychiatric care    • Hypertension    • Left anterior cruciate ligament tear    • Major depression 2021   • Obesity    • Onychomycosis    • PONV (postoperative nausea and vomiting)      Past Surgical History:   Procedure Laterality Date   • DILATATION AND CURETTAGE     • KNEE ACL RECONSTRUCTION Left 2022    Procedure: LEFT KNEE ATHROSCOPIC ANTERIOR CRUCIATE LIGAMENT RECONSTRUCTION  WITH  ALLOGRAFT;  Surgeon: Troy Hsu MD;  [place] [facility] [place]  Service: [place]opedics;  Laterality: Left;   • WISDOM TOOTH EXTRACTION       Social History     Socioeconomic History   • Marital status: Single   Tobacco Use   • Smoking status: Former     Packs/day: 0.50     Years: 6.00     Pack years: 3.00     Types: Cigarettes     Start date: 10/19/2010     Quit date: 10/19/2016     Years since quittin.2     Passive exposure: Never   • Smokeless tobacco: Never   Vaping Use   • Vaping Use: Never used   Substance and Sexual Activity   • Alcohol use: Not Currently     Comment: LAST DRINK 2021 alcoholism in remission   • Drug use: Never   • Sexual activity: Defer     Family History   Problem Relation Age of Onset   • Atrial fibrillation Father    • Heart failure Maternal Grandmother    • Heart failure Maternal Grandfather    • Heart failure Paternal Grandmother    • Heart failure Paternal Grandfather    • Heart disease Other    • Diabetes Other    • Breast cancer Other        No radiology results for the last 90 days.       "  Objective     Blood pressure 130/79, pulse 91, temperature 97.5 °F (36.4 °C), temperature source Tympanic, resp. rate 16, height 167.6 cm (66\"), weight 122 kg (269 lb 6.4 oz), SpO2 100 %, not currently breastfeeding.  Physical Exam  Vitals and nursing note reviewed.   Constitutional:       Appearance: She is obese.   HENT:      Head: Normocephalic and atraumatic.      Nose: Nose normal.      Mouth/Throat:      Mouth: Mucous membranes are moist.      Pharynx: Oropharynx is clear.   Eyes:      Conjunctiva/sclera: Conjunctivae normal.      Pupils: Pupils are equal, round, and reactive to light.   Cardiovascular:      Rate and Rhythm: Normal rate and regular rhythm.      Pulses: Normal pulses.      Heart sounds: Normal heart sounds.   Pulmonary:      Effort: Pulmonary effort is normal.      Breath sounds: Normal breath sounds.   Abdominal:      General: Abdomen is flat. Bowel sounds are normal.      Palpations: Abdomen is soft.   Musculoskeletal:         General: Normal range of motion.      Cervical back: Normal range of motion and neck supple.   Skin:     General: Skin is warm.      Capillary Refill: Capillary refill takes less than 2 seconds.   Neurological:      General: No focal deficit present.      Mental Status: She is alert and oriented to person, place, and time.   Psychiatric:         Mood and Affect: Mood normal.       Immunization History   Administered Date(s) Administered   • COVID-19 (PFIZER) PURPLE CAP 08/03/2021, 08/24/2021   • Covid-19 (Pfizer) Gray Cap 01/24/2022   • DT 10/17/1997   • DTP / HiB 1996, 1996, 01/06/1997   • DTaP 10/17/1997   • DTaP / Hep B / IPV 1996, 1996, 1996, 01/06/1997, 07/14/1997, 08/28/2013   • DTaP / IPV 07/28/2000   • DTaP, Unspecified 07/28/2000   • Flu Vaccine Quad PF >36MO 10/07/2020   • FluLaval/Fluzone >6mos 10/19/2021, 10/07/2022   • HPV Quadrivalent 06/17/2014, 08/18/2014, 12/22/2014   • Hep A, 2 Dose 06/17/2014, 12/22/2014   • Hep B, " Adolescent or Pediatric 1996, 07/14/1997   • HiB 10/17/1997   • Hib (PRP-T) 1996, 1996, 01/06/1997, 10/17/1997   • IPV 07/28/2000   • Influenza Injectable Mdck Pf Quad 10/07/2022   • Influenza Quad Vaccine (Inpatient) 09/16/2013   • MMR 10/17/1997, 07/28/2000   • MMRV 07/28/2000, 07/03/2007   • Meningococcal Conjugate 07/03/2007, 06/17/2014   • OPV 1996, 1996, 01/06/1997   • Tdap 07/03/2007, 10/07/2022   • Varicella 07/14/1997            Assessment & Plan     Diagnoses and all orders for this visit:    1. Class 3 severe obesity due to excess calories with serious comorbidity and body mass index (BMI) of 40.0 to 44.9 in adult (HCC) (Primary)  -     Home Sleep Study; Future    2. Mild intermittent asthma without complication  -     Home Sleep Study; Future         Discussion/ Recommendations:   Patient is advised to reduce weight her body mass index is 43.48  We will order sleep study for evaluation of possible obstructive sleep apnea  Continue Advair  PFTs were reviewed and discussed with the patient which show evidence of air trapping and hyperinflation likely secondary to asthma  Discussed vaccination and recommended    Class 3 Severe Obesity (BMI >=40). Obesity-related health conditions include the following: obstructive sleep apnea. Obesity is unchanged. BMI is is above average; BMI management plan is completed. We discussed low calorie, low carb based diet program, portion control and increasing exercise.           Return in about 3 months (around 4/12/2023).      Thank you for allowing me to participate in the care of Zhane Traylor. Please do not hesitate to contact me with any questions.         This document has been electronically signed by Ramon Knowles MD on January 12, 2023 15:21 EST     No

## 2023-02-06 NOTE — PATIENT PROFILE ADULT - NSPRONUTRITIONRISK_GEN_A_NUR
----- Message from PRINCESS Santos sent at 2/3/2023  4:51 PM CST -----  Hemoglobin has improved/is above normal.  Follows along with Hematology/Oncology for OLIMPIA.   No indicators present

## 2023-05-04 NOTE — PHYSICAL THERAPY INITIAL EVALUATION ADULT - REHAB POTENTIAL, PT EVAL
[facility]    Brief Operative Note    Pre-operative diagnosis: Encounter for care related to feeding tube [Z46.59]  Post-operative diagnosis Same as pre-operative diagnosis    Procedure: Procedure(s):  Esophagastroduodenoscopy REPLACEMENT, GASTROSTOMY TUBE, WITHOUT ENDOSCOPY  REPLACEMENT, JEJUNOSTOMY TUBE, PERCUTANEOUS  Surgeon: Surgeon(s) and Role:     * Mandeep Park MD - Primary     * Francisco Dodson MD  Anesthesia: General   Estimated Blood Loss: 2 mL from 5/4/2023  1:12 PM to 5/4/2023  3:09 PM      Drains: None  Specimens:   ID Type Source Tests Collected by Time Destination   A : esophageal brushings Brushing Other FUNGAL OR YEAST CULTURE ROUTINE Mandeep Park MD 5/4/2023  1:50 PM      Findings:   None.  Complications: None.  Implants: * No implants in log *     EGD:  1) Probable candida esophagitis  2) Broken G tube balloon  3) Multiple clips in antrum that under floroscopy might be collapsing against balloons and causing very frequent breakage    1) Endo scissors used to cut away three of four antral clips  2) Cold snare used to excise remaining clip embedded in mucosa  3) 3/4 clips retrieved, other will pass      J tube:  Contrast study showing very long 45 cm 12F tube in position, apparently downstream  Over glidewire, new 14F J tube cut to 20cm length beyond balloon passed into jejunum.   Balloon inflated to only 5cc w dilute contrast           good, to achieve stated therapy goals

## 2023-06-05 NOTE — DISCHARGE NOTE NURSING/CASE MANAGEMENT/SOCIAL WORK - CASE MANAGER'S NAME
This patient left without being seen and we will try to schedule for later today   Echo Mendez, RN, BSN

## 2023-07-13 NOTE — DISCHARGE NOTE PROVIDER - NSDCCAREPROVSEEN_GEN_ALL_CORE_FT
Hollsi Blake [Follow - Up] : a follow-up visit [DM Type 2] : DM Type 2 [Osteoporosis] : osteoporosis

## 2023-08-11 NOTE — H&P ADULT - NSHPPOACENTRALVENOUSCATHETER_GEN_ALL_CORE
Chief complaint:   Chief Complaint   Patient presents with   • Vaginal Itching     Entered by patient       Vitals:  Visit Vitals  /87   Pulse 74   Temp 97.2 °F (36.2 °C) (Tympanic)   Resp 18   LMP 07/26/2023 (Exact Date)   SpO2 98%       HISTORY OF PRESENT ILLNESS     HPI    Other significant problems:  Patient Active Problem List    Diagnosis Date Noted   • Sprain of anterior talofibular ligament of left ankle 05/30/2023     Priority: Low   • Closed nondisplaced fracture of cuboid bone of left foot 05/30/2023     Priority: Low   • Pelvic pressure in female 10/15/2020     Priority: Low   • RLQ abdominal pain 10/15/2020     Priority: Low   • Screening for malignant neoplasm of cervix 10/15/2020     Priority: Low   • Need for rhogam due to Rh negative mother 10/15/2020     Priority: Low   • Initiation of Depo Provera 10/15/2020     Priority: Low   • Anemia of mother in pregnancy, antepartum: Hgb 10.1 on 11/20/19. FE supplement discussed 12/16/2019     Priority: Low   • Supervision of normal pregnancy 07/30/2019     Priority: Low     Baby Rx platform     • Rh negative, antepartum 07/30/2019     Priority: Low   • Major depressive disorder, single episode, severe without psychotic features (CMD) 03/21/2016     Priority: Low       PAST MEDICAL, FAMILY AND SOCIAL HISTORY     Medications:  Current Outpatient Medications   Medication Sig Dispense Refill   • acetaminophen-codeine (TYLENOL with CODEINE) 120-12 MG/5ML solution Take 5 mLs by mouth every 6 hours as needed for Pain.     • Prenatal Vit-Fe Fumarate-FA (MULTIVITAMIN & MINERAL W/FOLIC ACID- PRENATAL) 27-1 MG Tab Take 1 tablet by mouth daily.     • albuterol 108 (90 BASE) MCG/ACT inhaler Inhale 2 puffs into the lungs every 4 hours as needed for Shortness of Breath or Wheezing.       No current facility-administered medications for this visit.       Allergies:  ALLERGIES:   Allergen Reactions   • Bee Sting SWELLING       Past Medical  History/Surgeries:  Past  Medical History:   Diagnosis Date   • Anxiety    • Depression    • Miscarriage 02/2019    coming for D&C   • NO HX OF     Ca, Htn, DM, CAD, CVA, DVT, liver disease or migraine   • RAD (reactive airway disease) March 2016    Rescue inhaler once a week       Past Surgical History:   Procedure Laterality Date   • D and c  2019    missed AB       Family History:  Family History   Problem Relation Age of Onset   • Patient is unaware of any medical problems Mother    • Patient is unaware of any medical problems Father    • Patient is unaware of any medical problems Sister    • Patient is unaware of any medical problems Brother    • Patient is unaware of any medical problems Brother    • Heart Maternal Grandmother    • Patient is unaware of any medical problems Paternal Grandmother    • Patient is unaware of any medical problems Paternal Grandfather    • Other Other         2020- neg ov, ut or colon ca   • Cancer, Breast Other        Social History:  Social History     Tobacco Use   • Smoking status: Never   • Smokeless tobacco: Never   Substance Use Topics   • Alcohol use: Yes     Comment: occ       REVIEW OF SYSTEMS     Review of Systems    PHYSICAL EXAM     Physical Exam    ASSESSMENT/PLAN     ***   no

## 2023-11-28 NOTE — ED ADULT NURSE NOTE - NSIMPLEMENTINTERV_GEN_ALL_ED
See above
Implemented All Universal Safety Interventions:  [place] to call system. Call bell, personal items and telephone within reach. Instruct patient to call for assistance. Room bathroom lighting operational. Non-slip footwear when patient is off stretcher. Physically safe environment: no spills, clutter or unnecessary equipment. Stretcher in lowest position, wheels locked, appropriate side rails in place.

## 2024-04-02 NOTE — PHYSICAL THERAPY INITIAL EVALUATION ADULT - IMPAIRMENTS FOUND, PT EVAL
Pt reports using O2 due to hx of CHF. Currently at baseline respiratory status    gait, locomotion, and balance

## 2024-05-24 NOTE — ED ADULT TRIAGE NOTE - BEFAST BALANCE
Assumed care of patient at 19:31 from dayshift RN Abel, charting as noted, patient is awake, calm, RR even and unlabored, denies sob, denies chest pain, denies headache, stretcher locked in lowest position, call bell placed within reach. No

## 2024-09-03 NOTE — ED PROVIDER NOTE - DISPOSITION TYPE
-- DO NOT REPLY / DO NOT REPLY ALL --  -- This inbox is not monitored. If this was sent to the wrong provider or department, reroute message to P [facility] Reroute pool. --  -- Message is from Engagement Center Operations ([facility]) --      Message Type:  Refill Medication   Refill request for Pended medication named: Pended  Preferred pharmacy verified, and selected.   [id number] - [address]    Is the patient OUT of Medication?  Yes and Medication Refills handled by [facility] Clinical        Message: Patient state he has been out of medication since Sunday                    DISCHARGE

## 2024-09-27 NOTE — ED PROVIDER NOTE - NS ED MD DISPO DIVISION
Adult Annual Physical  Name: Fidelia Morales      : 1966      MRN: [id number]  Encounter Provider: Toñito Escalera DO  Encounter Date: 2024   Encounter department: [facility]    Assessment & Plan  Adult general medical examination    Borderline hypercholesterolemia    Can hold off statin for now. Would recommend repeating CT coronary calcium score in 3-5 years.    Nutrition and Diet:  A diet emphasizing intake of vegetables, fruits, legumes, nuts, whole grains, and fish is recommended. A diet containing reduced amounts of cholesterol and sodium can be beneficial.  Replacement of saturated fat with dietary mono- and poly-unsaturated fats can be beneficial.  Minimizing the intake of trans fats, processed meats, refined carbohydrates, and sweetened beverages as part of a heart healthy diet.    Physical Activity:  Engage in at least 150 minutes per week of accumulated moderate intensity or 75 minutes per week of vigorous intensity aerobic physical activity (or an equivalent combination of moderate and vigorous activity)    Engaging in some moderate or vigorous intensity physical activity, even if less than this recommended amount, can be beneficial to reduce cardiovascular risk.    Intensity METS Examples   Sedentary Behavior* 1-1.5 Sitting, reclining, or lying; watching TV   Light 1.6-2.9 Walking slowly, cooking, light house work   Moderate 3.0-5.9 Brisk walking (2.4-4mph), biking 5-9mph, ballroom dancing, active yoga, recreational swimming   Vigorous >=6 Jogging/running, biking >=10mph, singles tennis, swimming laps     *Sedentary behavior is defined as any waking behavior characterized by an energy expenditure <=1.5 metabolic equivalents (METs), while in a sitting, reclining, or lying posture. Standing is a sedentary activity in that it involves <=1.5 METs, but is not considered a component of sedentary behavior; mph indicates miles per hour.    Orders:    Lipid Panel with  Direct LDL reflex; Future    Comprehensive metabolic panel; Future    Immunizations and preventive care screenings were discussed with patient today. Appropriate education was printed on patient's after visit summary.    Counseling:  Alcohol/drug use: discussed moderation in alcohol intake, the recommendations for healthy alcohol use, and avoidance of illicit drug use.  Dental Health: discussed importance of regular tooth brushing, flossing, and dental visits.  Injury prevention: discussed safety/seat belts, safety helmets, smoke detectors, carbon monoxide detectors, and smoking near bedding or upholstery.  Sexual health: discussed sexually transmitted diseases, partner selection, use of condoms, avoidance of unintended pregnancy, and contraceptive alternatives.  Exercise: the importance of regular exercise/physical activity was discussed. Recommend exercise 3-5 times per week for at least 30 minutes.       Depression Screening and Follow-up Plan: Patient was screened for depression during today's encounter. They screened negative with a PHQ-2 score of 0.      History of Present Illness     Adult Annual Physical:  Patient presents for annual physical. Health is stable overall. Cholesterol remains high and she did a CT coronary calcium score which came back at 79. Her lipoprotein (a) level was high. Denies any cardiac symptoms. Continues to deal with arthritis pain.     Diet and Physical Activity:  - Diet/Nutrition: well balanced diet.    Depression Screening:  - PHQ-2 Score: 0    General Health:  - Sleep: sleeps well.  - Hearing: normal hearing bilateral ears.  - Vision: no vision problems.  - Dental: regular dental visits.    /GYN Health:  - Follows with GYN: yes.     Review of Systems  Medical History Reviewed by provider this encounter:  Tobacco  Allergies  Meds  Problems  Med Hx  Surg Hx  Fam Hx         Objective     /70 (BP Location: Left arm, Patient Position: Sitting, Cuff Size: Standard)    "Pulse 69   Temp (!) 96.9 °F (36.1 °C) (Tympanic)   Resp 17   Ht 5' 1\" (1.549 m)   Wt 62.5 kg (137 lb 12.8 oz)   SpO2 99%   BMI 26.04 kg/m²     Physical Exam  Constitutional:       General: She is not in acute distress.     Appearance: She is not ill-appearing.   HENT:      Right Ear: Tympanic membrane, ear canal and external ear normal. There is no impacted cerumen.      Left Ear: Tympanic membrane, ear canal and external ear normal.   Cardiovascular:      Rate and Rhythm: Normal rate and regular rhythm.      Heart sounds: No murmur heard.  Pulmonary:      Effort: Pulmonary effort is normal. No respiratory distress.      Breath sounds: No wheezing.   Abdominal:      General: Bowel sounds are normal. There is no distension.      Tenderness: There is no abdominal tenderness.   Musculoskeletal:      Cervical back: No tenderness.      Right lower leg: No edema.      Left lower leg: No edema.   Lymphadenopathy:      Cervical: No cervical adenopathy.   Neurological:      Mental Status: She is alert.       Toñito Escalera,     " [facility]

## 2025-01-15 NOTE — PROGRESS NOTE ADULT - PROBLEM SELECTOR PLAN 7
Palliative care consulted for complex decision making related to goals of care and pain/symptom management. Patient remains a full code and remains hopeful that she will get stronger to be able to receive DMT. Hospice introduced once again to the patient as an option for her if needed in the near future, patient appreciative of our services. Emotional support provided. Patient plan is for discharge home today. knee pain/injury

## 2025-07-03 NOTE — DISCHARGE NOTE NURSING/CASE MANAGEMENT/SOCIAL WORK - CAREGIVER RELATION TO PATIENT
"Continue Prednisolone (pink) to 4 times daily right eye     Continue Atropine (red) twice daily right eye     Start Combigan (Dark Blue Cap) 1 drop 2 times a day on the right eye     What to watch out for:  If you experience any of the following \"RSVP Symptoms\", you should call immediately:  Worsening Redness  Worsening Sensitivity to light  Worsening Vision, including new flashing lights or floaters  Worsening Pain, including nausea/vomiting   " Daughter